# Patient Record
Sex: MALE | Race: ASIAN | NOT HISPANIC OR LATINO | Employment: OTHER | URBAN - METROPOLITAN AREA
[De-identification: names, ages, dates, MRNs, and addresses within clinical notes are randomized per-mention and may not be internally consistent; named-entity substitution may affect disease eponyms.]

---

## 2017-01-05 ENCOUNTER — TELEPHONE (OUTPATIENT)
Dept: CARDIOLOGY | Facility: CLINIC | Age: 47
End: 2017-01-05

## 2017-01-05 NOTE — TELEPHONE ENCOUNTER
Patient called requesting a lab slip be sent for labs due prior to OV 2-1-17 with Dr. Snyder.  Patient requests that an A1C is added onto lab as he has a family history of diabetes. Patient states he does not have a PMD at this time.   Patient would like lab slip mailed to him.   Will message Dr. Snyder

## 2017-01-12 DIAGNOSIS — E78.5 HYPERLIPIDEMIA WITH TARGET LDL LESS THAN 160: ICD-10-CM

## 2017-01-12 DIAGNOSIS — I42.9 MYOCARDIOPATHY (H): ICD-10-CM

## 2017-01-12 DIAGNOSIS — E78.2 MIXED HYPERLIPIDEMIA: Primary | ICD-10-CM

## 2017-01-12 LAB
ALT SERPL W P-5'-P-CCNC: 34 U/L (ref 0–70)
ANION GAP SERPL CALCULATED.3IONS-SCNC: 7 MMOL/L (ref 3–14)
BUN SERPL-MCNC: 13 MG/DL (ref 7–30)
CALCIUM SERPL-MCNC: 9.2 MG/DL (ref 8.5–10.1)
CHLORIDE SERPL-SCNC: 105 MMOL/L (ref 94–109)
CHOLEST SERPL-MCNC: 122 MG/DL
CO2 SERPL-SCNC: 30 MMOL/L (ref 20–32)
CREAT SERPL-MCNC: 0.87 MG/DL (ref 0.66–1.25)
GFR SERPL CREATININE-BSD FRML MDRD: ABNORMAL ML/MIN/1.7M2
GLUCOSE SERPL-MCNC: 103 MG/DL (ref 70–99)
HBA1C MFR BLD: 5.1 % (ref 4.3–6)
HDLC SERPL-MCNC: 41 MG/DL
LDLC SERPL CALC-MCNC: 56 MG/DL
NONHDLC SERPL-MCNC: 81 MG/DL
POTASSIUM SERPL-SCNC: 3.7 MMOL/L (ref 3.4–5.3)
SODIUM SERPL-SCNC: 142 MMOL/L (ref 133–144)
TRIGL SERPL-MCNC: 125 MG/DL

## 2017-01-12 PROCEDURE — 36415 COLL VENOUS BLD VENIPUNCTURE: CPT | Performed by: FAMILY MEDICINE

## 2017-01-12 PROCEDURE — 83036 HEMOGLOBIN GLYCOSYLATED A1C: CPT | Performed by: FAMILY MEDICINE

## 2017-01-12 PROCEDURE — 80061 LIPID PANEL: CPT | Performed by: FAMILY MEDICINE

## 2017-01-12 PROCEDURE — 84460 ALANINE AMINO (ALT) (SGPT): CPT | Performed by: FAMILY MEDICINE

## 2017-01-12 PROCEDURE — 80048 BASIC METABOLIC PNL TOTAL CA: CPT | Performed by: FAMILY MEDICINE

## 2017-01-17 ENCOUNTER — PRE VISIT (OUTPATIENT)
Dept: CARDIOLOGY | Facility: CLINIC | Age: 47
End: 2017-01-17

## 2017-02-01 ENCOUNTER — OFFICE VISIT (OUTPATIENT)
Dept: CARDIOLOGY | Facility: CLINIC | Age: 47
End: 2017-02-01
Attending: INTERNAL MEDICINE
Payer: COMMERCIAL

## 2017-02-01 VITALS
DIASTOLIC BLOOD PRESSURE: 87 MMHG | BODY MASS INDEX: 25.46 KG/M2 | HEIGHT: 69 IN | SYSTOLIC BLOOD PRESSURE: 125 MMHG | HEART RATE: 87 BPM | WEIGHT: 171.9 LBS

## 2017-02-01 DIAGNOSIS — I42.9 CARDIOMYOPATHY OF UNDETERMINED TYPE (H): Primary | Chronic | ICD-10-CM

## 2017-02-01 DIAGNOSIS — Z82.49 FAMILY HISTORY OF EARLY CAD: ICD-10-CM

## 2017-02-01 DIAGNOSIS — E78.2 MIXED HYPERLIPIDEMIA: Chronic | ICD-10-CM

## 2017-02-01 PROCEDURE — 99213 OFFICE O/P EST LOW 20 MIN: CPT | Performed by: INTERNAL MEDICINE

## 2017-02-01 RX ORDER — ATORVASTATIN CALCIUM 10 MG/1
10 TABLET, FILM COATED ORAL DAILY
Qty: 90 TABLET | Refills: 3 | Status: SHIPPED | OUTPATIENT
Start: 2017-02-01 | End: 2017-05-31

## 2017-02-01 NOTE — MR AVS SNAPSHOT
"              After Visit Summary   2/1/2017    Eloise Johnson    MRN: [id number]           Patient Information     Date Of Birth          1970        Visit Information        Provider Department      2/1/2017 3:45 PM Marshal Snyder MD [facility] [place]        Today's Diagnoses     Cardiomyopathy of undetermined type (H)    -  1     Mixed hyperlipidemia         Family history of early CAD            Follow-ups after your visit        Who to contact     If you have questions or need follow up information about today's clinic visit or your schedule please contact [facility] [place] directly at [phone].  Normal or non-critical lab and imaging results will be communicated to you by MyChart, letter or phone within 4 business days after the clinic has received the results. If you do not hear from us within 7 days, please contact the clinic through [employer] or phone. If you have a critical or abnormal lab result, we will notify you by phone as soon as possible.  Submit refill requests through [employer] or call your pharmacy and they will forward the refill request to us. Please allow 3 business days for your refill to be completed.          Additional Information About Your Visit        MyChart Information     [employer] gives you secure access to your electronic health record. If you see a primary care provider, you can also send messages to your care team and make appointments. If you have questions, please call your primary care clinic.  If you do not have a primary care provider, please call [phone] and they will assist you.        Care EveryWhere ID     This is your Care EveryWhere ID. This could be used by other organizations to access your [place] medical records  [id number]        Your Vitals Were     Pulse Height BMI (Body Mass Index)             87 1.753 m (5' 9\") 25.37 kg/m2          Blood Pressure from Last 3 Encounters:   02/01/17 " 125/87   01/14/16 128/86   06/08/15 124/78    Weight from Last 3 Encounters:   02/01/17 77.973 kg (171 lb 14.4 oz)   01/14/16 84.823 kg (187 lb)   06/08/15 82.555 kg (182 lb)              We Performed the Following     Follow-Up with Cardiologist          Where to get your medicines      Some of these will need a paper prescription and others can be bought over the counter.  Ask your nurse if you have questions.     Bring a paper prescription for each of these medications    - atorvastatin 10 MG tablet       Primary Care Provider    None Specified       No primary provider on file.        Thank you!     Thank you for choosing [facility] [place]  for your care. Our goal is always to provide you with excellent care. Hearing back from our patients is one way we can continue to improve our services. Please take a few minutes to complete the written survey that you may receive in the mail after your visit with us. Thank you!             Your Updated Medication List - Protect others around you: Learn how to safely use, store and throw away your medicines at www.disposemymeds.org.          This list is accurate as of: 2/1/17  4:27 PM.  Always use your most recent med list.                   Brand Name Dispense Instructions for use    aspirin EC 81 MG EC tablet      Take 1 tablet (81 mg) by mouth every other day       atorvastatin 10 MG tablet    LIPITOR    90 tablet    Take 1 tablet (10 mg) by mouth daily       Multi-vitamin Tabs tablet   Generic drug:  multivitamin, therapeutic with minerals      Take 1 tablet by mouth daily.       omega-3 fatty acids 1200 MG capsule      Take 2 capsules by mouth daily.       vitamin D 1000 UNITS capsule      Take 1 capsule by mouth daily

## 2017-02-01 NOTE — PROGRESS NOTES
HPI and Plan:   See dictation    Orders Placed This Encounter   Procedures     Lipid Profile     Basic metabolic panel     Basic metabolic panel     Lipid Profile     Follow-Up with Cardiac Advanced Practice Provider     Follow-Up with Cardiologist       Orders Placed This Encounter   Medications     atorvastatin (LIPITOR) 10 MG tablet     Sig: Take 1 tablet (10 mg) by mouth daily     Dispense:  90 tablet     Refill:  3       Medications Discontinued During This Encounter   Medication Reason     GRAPE SEED CR OR Medication Reconciliation Clean Up     atorvastatin (LIPITOR) 10 MG tablet Reorder         Encounter Diagnoses   Name Primary?     Cardiomyopathy of undetermined type (H) Yes     Mixed hyperlipidemia      Family history of early CAD        CURRENT MEDICATIONS:  Current Outpatient Prescriptions   Medication Sig Dispense Refill     atorvastatin (LIPITOR) 10 MG tablet Take 1 tablet (10 mg) by mouth daily 90 tablet 3     Cholecalciferol (VITAMIN D) 1000 UNITS capsule Take 1 capsule by mouth daily       aspirin EC 81 MG tablet Take 1 tablet (81 mg) by mouth every other day       Multiple Vitamin (MULTI-VITAMIN) per tablet Take 1 tablet by mouth daily.       omega-3 fatty acids 1200 MG capsule Take 2 capsules by mouth daily.       [DISCONTINUED] atorvastatin (LIPITOR) 10 MG tablet Take 1 tablet (10 mg) by mouth daily 90 tablet 3       ALLERGIES   No Known Allergies    PAST MEDICAL HISTORY:  Past Medical History   Diagnosis Date     Hyperlipidemia      Family history of early CAD      Cardiomyopathy (H)      idiopathic nonischemic cardiomyopathy       PAST SURGICAL HISTORY:  Past Surgical History   Procedure Laterality Date     Hc tooth extraction w/forcep       Colonoscopy with co2 insufflation N/A 10/24/2014     Procedure: COLONOSCOPY WITH CO2 INSUFFLATION;  Surgeon: Duane, William Charles, MD;  Location:  OR       FAMILY HISTORY:  Family History   Problem Relation Age of Onset     C.A.D. Father      DIABETES  "Maternal Grandmother        SOCIAL HISTORY:  Social History     Social History     Marital Status:      Spouse Name: N/A     Number of Children: N/A     Years of Education: N/A     Social History Main Topics     Smoking status: Former Smoker -- 1.00 packs/day for 2 years     Types: Cigarettes     Smokeless tobacco: Never Used     Alcohol Use: 0.5 - 1.0 oz/week     1-2 Standard drinks or equivalent per week      Comment: socially     Drug Use: No     Sexual Activity: Yes     Other Topics Concern     Caffeine Concern Yes     tea 1-2 per day     Sleep Concern No     Stress Concern No     Weight Concern No     Special Diet No     Exercise No     Seat Belt Yes     Social History Narrative       Review of Systems:  Skin:  Negative       Eyes:  Positive for glasses    ENT:  Negative      Respiratory:  Negative       Cardiovascular:    Positive for;fatigue occ.  Gastroenterology: Negative      Genitourinary:  Negative      Musculoskeletal:  Negative      Neurologic:  Negative      Psychiatric:  Negative      Heme/Lymph/Imm:  Negative      Endocrine:  Negative        Physical Exam:  Vitals: /87 mmHg  Pulse 87  Ht 1.753 m (5' 9\")  Wt 77.973 kg (171 lb 14.4 oz)  BMI 25.37 kg/m2    Constitutional:  cooperative, alert and oriented, well developed, well nourished, in no acute distress        Skin:  warm and dry to the touch, no apparent skin lesions or masses noted        Head:  normocephalic, no masses or lesions        Eyes:  pupils equal and round;conjunctivae and lids unremarkable;sclera white;no xanthalasma;no nystagmus        ENT:  no pallor or cyanosis, dentition good        Neck:  carotid pulses are full and equal bilaterally;no carotid bruit        Chest:  normal breath sounds, clear to auscultation, normal A-P diameter, normal symmetry, normal respiratory excursion, no use of accessory muscles          Cardiac: regular rhythm;normal S1 and S2;no S3 or S4;no murmurs, gallops or rubs detected              "     Abdomen:           Vascular: pulses full and equal                                        Extremities and Back:  no edema;no spinal abnormalities noted;normal muscle strength and tone              Neurological:  affect appropriate, oriented to time, person and place;no gross motor deficits              CC  Marshal Snyder MD  [facility]  [address]  [place] [id number]

## 2017-02-02 NOTE — PROGRESS NOTES
HISTORY OF PRESENT ILLNESS:  Mr. Johnson is a very nice 46-year-old gentleman with past medical history significant for his father dying of a heart attack at age 42.  He has significant HDL deficiency and hypertriglyceridemia.  A stress test in 2009 demonstrated no evidence of ischemia.  Eloise has always been quite reluctant to be on medications.  Ultimately, I will have convinced him to go on 10 mg of atorvastatin and a baby-dose aspirin.  He has also been unwilling to do any sort of test that would involve radiation including a calcium scoring test.  In 2015 he agreed to undergo an MRI, which demonstrated normal perfusion.  Again, he was noted to have a mildly decreased left ventricular function with ejection fraction of 48%.  His stress echo from 2015 also demonstrates decreased left ventricular function, ejection fraction of 40%-45% consistent with a cardiomyopathy of unclear etiology.      Eloise returns to clinic stating he is doing well from a cardiac standpoint but unfortunately is going through a very stressful divorce.  He states he is not eating well.  He has lost 25 pounds of weight and is causing some insomnia problems.  He thinks he is exercising about every other day walking and he has no chest, arm, neck, jaw or shoulder discomfort with this.  He has had no lightheadedness, dizziness, syncope or near-syncope.  He remains on his medications, although he stopped his Resveratrol.      ASSESSMENT AND PLAN:  Eloise appears to be doing well from a cardiac standpoint without clinical evidence of ischemia, heart failure or significant arrhythmia.      With his weight loss, his cholesterol profile is the best it has ever been.  Total cholesterol is 122, HDL is 41, LDL is 56, triglycerides are 125.      We talked about whether these numbers are real or not, and I have told him that they probably are real because of his weight loss, that unfortunately stress is also a cardiac risk factor and that obviously has gone up  and there is no way to quantify that.      Blood pressure is very well controlled at 125/87.  With his weight loss, his body mass index is now 25.4.      He is concerned about his glucose that runs 103.  I pulled up 5 glucoses that I have over the years, and he tends to run in the high 90s to low 100s, and we talked about factors that may make him manifest diabetes including weight gain, high-carbohydrate diet or sedentary lifestyle.  He does eat a traditional Chinese diet and does like to eat his white rice, although he states he is only eating 1 bowl and right now he has anorexia often enough that he is not even eating that much.      We will plan on seeing him back in 1 year.  If he should have any problems, I would be glad to see him sooner.         ABDULAZIZ SMITH MD, [facility]             D: 2017 16:36   T: 2017 05:36   MT: JAIME      Name:     BRIGHT DONIS   MRN:      [id number]        Account:      [id number]   :      1970           Service Date: 2017      Document: [id number]

## 2017-05-01 ENCOUNTER — OFFICE VISIT (OUTPATIENT)
Dept: FAMILY MEDICINE | Facility: CLINIC | Age: 47
End: 2017-05-01
Payer: COMMERCIAL

## 2017-05-01 VITALS
WEIGHT: 170 LBS | SYSTOLIC BLOOD PRESSURE: 124 MMHG | TEMPERATURE: 97.9 F | HEART RATE: 72 BPM | RESPIRATION RATE: 16 BRPM | HEIGHT: 72 IN | BODY MASS INDEX: 23.03 KG/M2 | DIASTOLIC BLOOD PRESSURE: 80 MMHG

## 2017-05-01 DIAGNOSIS — F51.04 PSYCHOPHYSIOLOGICAL INSOMNIA: ICD-10-CM

## 2017-05-01 DIAGNOSIS — F41.9 ANXIETY: Primary | ICD-10-CM

## 2017-05-01 PROCEDURE — 99214 OFFICE O/P EST MOD 30 MIN: CPT | Performed by: FAMILY MEDICINE

## 2017-05-01 RX ORDER — POLYETHYLENE GLYCOL 3350 17 G/17G
POWDER, FOR SOLUTION ORAL
Refills: 6 | COMMUNITY
Start: 2017-01-26 | End: 2017-05-01

## 2017-05-01 RX ORDER — BIOTIN 1 MG
TABLET ORAL
Refills: 6 | COMMUNITY
Start: 2017-02-04 | End: 2017-05-01

## 2017-05-01 RX ORDER — CITALOPRAM HYDROBROMIDE 20 MG/1
20 TABLET ORAL DAILY
Qty: 30 TABLET | Refills: 1 | Status: SHIPPED | OUTPATIENT
Start: 2017-05-01 | End: 2017-07-13

## 2017-05-01 ASSESSMENT — ANXIETY QUESTIONNAIRES
GAD7 TOTAL SCORE: 12
7. FEELING AFRAID AS IF SOMETHING AWFUL MIGHT HAPPEN: NEARLY EVERY DAY
IF YOU CHECKED OFF ANY PROBLEMS ON THIS QUESTIONNAIRE, HOW DIFFICULT HAVE THESE PROBLEMS MADE IT FOR YOU TO DO YOUR WORK, TAKE CARE OF THINGS AT HOME, OR GET ALONG WITH OTHER PEOPLE: VERY DIFFICULT
1. FEELING NERVOUS, ANXIOUS, OR ON EDGE: MORE THAN HALF THE DAYS
2. NOT BEING ABLE TO STOP OR CONTROL WORRYING: NEARLY EVERY DAY
6. BECOMING EASILY ANNOYED OR IRRITABLE: NOT AT ALL
3. WORRYING TOO MUCH ABOUT DIFFERENT THINGS: MORE THAN HALF THE DAYS
5. BEING SO RESTLESS THAT IT IS HARD TO SIT STILL: NOT AT ALL
4. TROUBLE RELAXING: MORE THAN HALF THE DAYS

## 2017-05-01 ASSESSMENT — PAIN SCALES - GENERAL: PAINLEVEL: NO PAIN (0)

## 2017-05-01 NOTE — LETTER
My Depression Action Plan  Name: Eloise Johnson   Date of Birth 1970  Date: 5/1/2017    My doctor: No primary care provider on file.   [facility]  [address] [place] [address], [address]  [place] [id number] [phone]          GREEN    ZONE   Good Control    What it looks like:     Things are going generally well. You have normal up s and down s. You may even feel depressed from time to time, but bad moods usually last less than a day.   What you need to do:  1. Continue to care for yourself (see self care plan)  2. Check your depression survival kit and update it as needed  3. Follow your physician s recommendations including any medication.  4. Do not stop taking medication unless you consult with your physician first.           YELLOW         ZONE Getting Worse    What it looks like:     Depression is starting to interfere with your life.     It may be hard to get out of bed; you may be starting to isolate yourself from others.    Symptoms of depression are starting to last most all day and this has happened for several days.     You may have suicidal thoughts but they are not constant.   What you need to do:     1. Call your care team, your response to treatment will improve if you keep your care team informed of your progress. Yellow periods are signs an adjustment may need to be made.     2. Continue your self-care, even if you have to fake it!    3. Talk to someone in your support network    4. Open up your depression survival kit           RED    ZONE Medical Alert - Get Help    What it looks like:     Depression is seriously interfering with your life.     You may experience these or other symptoms: You can t get out of bed most days, can t work or engage in other necessary activities, you have trouble taking care of basic hygiene, or basic responsibilities, thoughts of suicide or death that will not go away, self-injurious behavior.     What you need to do:  1. Call your  care team and request a same-day appointment. If they are not available (weekends or after hours) call your local crisis line, emergency room or 911.      Electronically signed by: Fabian Crowder, May 1, 2017    Depression Self Care Plan / Survival Kit    Self-Care for Depression  Here s the deal. Your body and mind are really not as separate as most people think.  What you do and think affects how you feel and how you feel influences what you do and think. This means if you do things that people who feel good do, it will help you feel better.  Sometimes this is all it takes.  There is also a place for medication and therapy depending on how severe your depression is, so be sure to consult with your medical provider and/ or Behavioral Health Consultant if your symptoms are worsening or not improving.     In order to better manage my stress, I will:    Exercise  Get some form of exercise, every day. This will help reduce pain and release endorphins, the  feel good  chemicals in your brain. This is almost as good as taking antidepressants!  This is not the same as joining a gym and then never going! (they count on that by the way ) It can be as simple as just going for a walk or doing some gardening, anything that will get you moving.      Hygiene   Maintain good hygiene (Get out of bed in the morning, Make your bed, Brush your teeth, Take a shower, and Get dressed like you were going to work, even if you are unemployed).  If your clothes don't fit try to get ones that do.    Diet  I will strive to eat foods that are good for me, drink plenty of water, and avoid excessive sugar, caffeine, alcohol, and other mood-altering substances.  Some foods that are helpful in depression are: complex carbohydrates, B vitamins, flaxseed, fish or fish oil, fresh fruits and vegetables.    Psychotherapy  I agree to participate in Individual Therapy (if recommended).    Medication  If prescribed medications, I agree to take them.   Missing doses can result in serious side effects.  I understand that drinking alcohol, or other illicit drug use, may cause potential side effects.  I will not stop my medication abruptly without first discussing it with my provider.    Staying Connected With Others  I will stay in touch with my friends, family members, and my primary care provider/team.    Use your imagination  Be creative.  We all have a creative side; it doesn t matter if it s oil painting, sand castles, or mud pies! This will also kick up the endorphins.    Witness Beauty  (AKA stop and smell the roses) Take a look outside, even in mid-winter. Notice colors, textures. Watch the squirrels and birds.     Service to others  Be of service to others.  There is always someone else in need.  By helping others we can  get out of ourselves  and remember the really important things.  This also provides opportunities for practicing all the other parts of the program.    Humor  Laugh and be silly!  Adjust your TV habits for less news and crime-drama and more comedy.    Control your stress  Try breathing deep, massage therapy, biofeedback, and meditation. Find time to relax each day.     My support system    Clinic Contact:  Phone number:    Contact 1:  Phone number:    Contact 2:  Phone number:    [employer]/:  Phone number:    Therapist:  Phone number:    Local crisis center:    Phone number:    Other community support:  Phone number:

## 2017-05-01 NOTE — MR AVS SNAPSHOT
After Visit Summary   5/1/2017    Eloise Johnson    MRN: [id number]           Patient Information     Date Of Birth          1970        Visit Information        Provider Department      5/1/2017 11:00 AM Fabian Crowder MD [facility]        Today's Diagnoses     Anxiety    -  1    Psychophysiological insomnia          Care Instructions    These are new changes to your current plan of care based on today's visit:    Medications stopped    Medications to be started Celexa 10 mg daily for six doses and then increase to 20 mg daily   Change dose of this medication    New treatments        Follow up appointments:    1.  FOLLOW UP WITH YOUR PRIMARY CARE PROVIDER: 4 week(s) for clinic visit    2. FOLLOW UP WITH SPECIALIST:Continue with individual therapy/counseling    Fabian Crowder MD              Follow-ups after your visit        Additional Services     MENTAL HEALTH REFERRAL       Your provider has referred you to: [facility]: [facility] - Counseling (Individual/Couples/Family) - [facility] [phone]   [email or web address]   *Patient will be contacted by [facility] scheduling partner, [facility] ([facility]), to schedule an appointment.  Patients may also call P to schedule.  [facility] [phone]   [email or web address]   *Patient will be contacted by [facility] scheduling partner, [facility] ([facility]), to schedule an appointment.  Patients may also call P to schedule.    All scheduling is subject to the client's specific insurance plan & benefits, provider/location availability, and provider clinical specialities.  Please arrive 15 minutes early for your first appointment and bring your completed paperwork.    Please be aware that coverage of these services is subject to the terms and limitations  "of your health insurance plan.  Call member services at your health plan with any benefit or coverage questions.                  Who to contact     If you have questions or need follow up information about today's clinic visit or your schedule please contact [facility] directly at [phone].  Normal or non-critical lab and imaging results will be communicated to you by MyChart, letter or phone within 4 business days after the clinic has received the results. If you do not hear from us within 7 days, please contact the clinic through [employer] or phone. If you have a critical or abnormal lab result, we will notify you by phone as soon as possible.  Submit refill requests through [employer] or call your pharmacy and they will forward the refill request to us. Please allow 3 business days for your refill to be completed.          Additional Information About Your Visit        [employer] Information     [employer] gives you secure access to your electronic health record. If you see a primary care provider, you can also send messages to your care team and make appointments. If you have questions, please call your primary care clinic.  If you do not have a primary care provider, please call [phone] and they will assist you.        Care EveryWhere ID     This is your Care EveryWhere ID. This could be used by other organizations to access your [place] medical records  [id number]        Your Vitals Were     Pulse Temperature Respirations Height BMI (Body Mass Index)       72 97.9  F (36.6  C) (Temporal) 16 5' 11.5\" (1.816 m) 23.38 kg/m2        Blood Pressure from Last 3 Encounters:   05/01/17 124/80   02/01/17 125/87   01/14/16 128/86    Weight from Last 3 Encounters:   05/01/17 170 lb (77.1 kg)   02/01/17 171 lb 14.4 oz (78 kg)   01/14/16 187 lb (84.8 kg)              We Performed the Following     DEPRESSION ACTION PLAN (DAP)     MENTAL HEALTH REFERRAL          Today's Medication Changes          These " changes are accurate as of: 5/1/17 11:35 AM.  If you have any questions, ask your nurse or doctor.               Start taking these medicines.        Dose/Directions    citalopram 20 MG tablet   Commonly known as:  celeXA   Used for:  Anxiety, Psychophysiological insomnia   Started by:  Fabian Crowder MD        Dose:  20 mg   Take 1 tablet (20 mg) by mouth daily   Quantity:  30 tablet   Refills:  1         Stop taking these medicines if you haven't already. Please contact your care team if you have questions.     Fiber (Corn Dextrin) Powd   Stopped by:  Fabian Crowder MD           polyethylene glycol powder   Commonly known as:  MIRALAX/GLYCOLAX   Stopped by:  Fabian Crowder MD                Where to get your medicines      These medications were sent to [facility] [address] [place] [address] [address] AT [facility] & [address] , [facility] [address]     Phone:  [phone]     citalopram 20 MG tablet                Primary Care Provider    None Specified       No primary provider on file.        Thank you!     Thank you for choosing [facility]  for your care. Our goal is always to provide you with excellent care. Hearing back from our patients is one way we can continue to improve our services. Please take a few minutes to complete the written survey that you may receive in the mail after your visit with us. Thank you!             Your Updated Medication List - Protect others around you: Learn how to safely use, store and throw away your medicines at www.disposemymeds.org.          This list is accurate as of: 5/1/17 11:35 AM.  Always use your most recent med list.                   Brand Name Dispense Instructions for use    aspirin EC 81 MG EC tablet      Take 1 tablet (81 mg) by mouth every other day       atorvastatin 10 MG tablet    LIPITOR    90 tablet    Take 1 tablet (10 mg) by mouth daily       citalopram 20 MG tablet    celeXA     30 tablet    Take 1 tablet (20 mg) by mouth daily       Multi-vitamin Tabs tablet   Generic drug:  multivitamin, therapeutic with minerals      Take 1 tablet by mouth daily.       omega-3 fatty acids 1200 MG capsule      Take 2 capsules by mouth daily.       vitamin D 1000 UNITS capsule      Take 1 capsule by mouth daily

## 2017-05-01 NOTE — NURSING NOTE
"Chief Complaint   Patient presents with     Anxiety     Insomnia     Panel Management     FANTA        Initial /80 (BP Location: Left arm, Cuff Size: Adult Regular)  Pulse 72  Temp 97.9  F (36.6  C) (Temporal)  Resp 16  Ht 5' 11.5\" (1.816 m)  Wt 170 lb (77.1 kg)  BMI 23.38 kg/m2 Estimated body mass index is 23.38 kg/(m^2) as calculated from the following:    Height as of this encounter: 5' 11.5\" (1.816 m).    Weight as of this encounter: 170 lb (77.1 kg).  Medication Reconciliation: complete       Figueroa Owusu MA    "

## 2017-05-01 NOTE — PATIENT INSTRUCTIONS
These are new changes to your current plan of care based on today's visit:    Medications stopped    Medications to be started Celexa 10 mg daily for six doses and then increase to 20 mg daily   Change dose of this medication    New treatments        Follow up appointments:    1.  FOLLOW UP WITH YOUR PRIMARY CARE PROVIDER: 4 week(s) for clinic visit    2. FOLLOW UP WITH SPECIALIST:Continue with individual therapy/counseling    Fabian Crowder MD

## 2017-05-01 NOTE — PROGRESS NOTES
"  SUBJECTIVE:                                                    Eloise He is a 47 year old male who presents to clinic today for the following health issues:      Abnormal Mood Symptoms--    This patient's had increased symptoms of anxiety which is now causing some sleep disturbance with early awakening. 3 years ago he was treated for anxiety and depression with citalopram and bupropion. This resolved and he has been off medications since 2015. The source of his anxiety and possible depression was from work stress.    3 months ago he's had increased family stress due to  divorce proceedings. He is currently  from his wife and his children are staying with him in their home. The stress is increased as a legal proceedings began. He becomes restless at night and wakes up early in the middle the night and cannot go back to sleep. He feels increasing levels of anxiety. Possibly some mild depression but mainly anxiety.    No suicidal thoughts or self-harm is noted.  He is currently seeing a therapist every other week.  He is looking to change therapists.        Onset: 3 MONTHS     Description:   Depression: no- \"I don't know\"  Anxiety: YES    Accompanying Signs & Symptoms:  Still participating in activities that you used to enjoy: no  Fatigue: YES  Irritability: no   Difficulty concentrating: YES  Changes in appetite: YES  Problems with sleep: YES- wake up in the middle of the night, sleepiness,   Heart racing/beating fast : no   Thoughts of hurting yourself or others: none     History:   Recent stress: YES- home and family   Prior depression hospitalization: None  Family history of depression: no  Family history of anxiety: no      Precipitating factors:   Alcohol/drug use: no    Alleviating factors:  None        Therapies Tried and outcome: herbal, melatonin     Insomnia--appears to be from early awakening at 3 AM in the morning due to anxiety and inability to fall back asleep. He may have had trazodone in the " "past and does not wish to have any hypnotics.        Onset: 3 months     Description:   Time to fall asleep (sleep latency): 30 minutes or more   Middle of night awakening:  YES  Early morning awakening:  YES, feel nervous and fear in the morning     Progression of Symptoms:  same    Accompanying Signs & Symptoms:  Daytime sleepiness/napping: YES  Excessive snoring/apnea: no   Restless legs: YES- some   Frequent urination: YES  Chronic pain:  no   History:  Prior Insomnia: YES- had it before 2014     Precipitating factors:   New stressful situation: YES- family issues   Caffeine intake: YES- tea and sometime coffee- \"not very often\"   OTC decongestants: no   Any new medications: no     Alleviating factors:  Self medicating (alcohol, etc.):  no       Therapies Tried and outcome: melatonin-  Does not help much          Problem list and histories reviewed & adjusted, as indicated.  Additional history: as documented        Reviewed and updated as needed this visit by clinical staff       Reviewed and updated as needed this visit by Provider         ROS:  C: NEGATIVE for fever, chills, change in weight  I: NEGATIVE for worrisome rashes, moles or lesions  E: NEGATIVE for vision changes or irritation  E/M: NEGATIVE for ear, mouth and throat problems  R: NEGATIVE for significant cough or SOB  B: NEGATIVE for masses, tenderness or discharge  CV: NEGATIVE for chest pain, palpitations or peripheral edema  CV: Is on primary preventative therapy for coronary disease. On statin therapy and aspirin. No active cardiac disease.  GI: NEGATIVE for nausea, abdominal pain, heartburn, or change in bowel habits  : NEGATIVE for frequency, dysuria, or hematuria  M: NEGATIVE for significant arthralgias or myalgia  N: NEGATIVE for weakness, dizziness or paresthesias  E: NEGATIVE for temperature intolerance, skin/hair changes  H: NEGATIVE for bleeding problems  P: NEGATIVE for changes in mood or affect  PSYCHIATRIC: As noted " "above    OBJECTIVE:                                                    /80 (BP Location: Left arm, Cuff Size: Adult Regular)  Pulse 72  Temp 97.9  F (36.6  C) (Temporal)  Resp 16  Ht 5' 11.5\" (1.816 m)  Wt 170 lb (77.1 kg)  BMI 23.38 kg/m2  Body mass index is 23.38 kg/(m^2).  GENERAL: alert, active, no distress, cooperative and over weight  EYES: Eyes grossly normal to inspection, extraocular movements - intact, and PERRL  HENT: ear canals- normal; TMs- normal; Nose- normal; Mouth- no ulcers, no lesions  NECK: no tenderness, no adenopathy, no asymmetry, no masses, no stiffness; thyroid- normal to palpation  RESP: lungs clear to auscultation - no rales, no rhonchi, no wheezes  CV: regular rates and rhythm, normal S1 S2, no S3 or S4 and no murmur, no click or rub -  ABDOMEN: soft, no tenderness, no  hepatosplenomegaly, no masses, normal bowel sounds  MS: extremities- no gross deformities noted, no edema  NEURO: strength and tone- normal, sensory exam- grossly normal, mentation- intact, speech- normal, reflexes- symmetric  PSYCH: Alert and oriented times 3; speech- coherent , normal rate and volume; able to articulate logical thoughts, able to abstract reason, no tangential thoughts, no hallucinations or delusions, affect- normal    Diagnostic test results:  Diagnostic Test Results:  none      ASSESSMENT/PLAN:                                                    1. Anxiety   Past history of anxiety which responded well to Citalopram. Will begin Citalopram 20 mg daily and follow-up in 4 weeks. Have not prescribed Wellbutrin due to minimal depression if any. He will also schedule with a new counselor and therapist.  - citalopram (CELEXA) 20 MG tablet; Take 1 tablet (20 mg) by mouth daily  Dispense: 30 tablet; Refill: 1  - MENTAL HEALTH REFERRAL    2. Psychophysiological insomnia   We'll treat the anxiety with SSRI therapy and monitor over time. Additional medications given.  - citalopram (CELEXA) 20 MG tablet; " Take 1 tablet (20 mg) by mouth daily  Dispense: 30 tablet; Refill: 1  - MENTAL HEALTH REFERRAL      Follow up with Provider - Follow-up in about 4 weeks and adjust medication doses needed.   See Patient Instructions    The patient understood the rational for the diagnosis and treatment plan. All questions were answered to best of my ability and the patient's satisfaction.      Fabian Crowder MD  [facility]

## 2017-05-02 ASSESSMENT — ANXIETY QUESTIONNAIRES: GAD7 TOTAL SCORE: 12

## 2017-05-22 ENCOUNTER — TELEPHONE (OUTPATIENT)
Dept: FAMILY MEDICINE | Facility: CLINIC | Age: 47
End: 2017-05-22

## 2017-05-22 DIAGNOSIS — Z78.9 NO IMMUNIZATION HISTORY RECORD: Primary | ICD-10-CM

## 2017-05-22 NOTE — TELEPHONE ENCOUNTER
Reason for Call:  Other question about immunization    Detailed comments: patient needs to get some immunizations for a new job and the patient doesn't know if he ever had the MMR or the chicken pox. Patient wants to know if he should just have the immunizations done or should he have lab work to see if he needs them?     Phone Number Patient can be reached at: Cell number on file:    Telephone Information:   Mobile [phone]       Best Time: anytime    Can we leave a detailed message on this number? YES    Call taken on 5/22/2017 at 2:20 PM by Krystle Vega

## 2017-05-22 NOTE — TELEPHONE ENCOUNTER
Patient notified no hx on file for these immunizations. Patient will come in for titer to be drawn tomorrow. Placed on lab schedule. Also needs TB skin test, on float schedule.     Taylor Ivy, RN, BSN

## 2017-05-23 ENCOUNTER — ALLIED HEALTH/NURSE VISIT (OUTPATIENT)
Dept: FAMILY MEDICINE | Facility: CLINIC | Age: 47
End: 2017-05-23
Payer: COMMERCIAL

## 2017-05-23 DIAGNOSIS — Z11.1 SCREENING EXAMINATION FOR PULMONARY TUBERCULOSIS: Primary | ICD-10-CM

## 2017-05-23 DIAGNOSIS — Z53.9 ERRONEOUS ENCOUNTER--DISREGARD: Primary | ICD-10-CM

## 2017-05-23 DIAGNOSIS — Z78.9 NO IMMUNIZATION HISTORY RECORD: ICD-10-CM

## 2017-05-23 PROCEDURE — 86787 VARICELLA-ZOSTER ANTIBODY: CPT | Performed by: FAMILY MEDICINE

## 2017-05-23 PROCEDURE — 86765 RUBEOLA ANTIBODY: CPT | Performed by: FAMILY MEDICINE

## 2017-05-23 PROCEDURE — 86735 MUMPS ANTIBODY: CPT | Performed by: FAMILY MEDICINE

## 2017-05-23 PROCEDURE — 86580 TB INTRADERMAL TEST: CPT

## 2017-05-23 PROCEDURE — 86762 RUBELLA ANTIBODY: CPT | Performed by: FAMILY MEDICINE

## 2017-05-23 PROCEDURE — 99207 ZZC NO CHARGE NURSE ONLY: CPT

## 2017-05-23 PROCEDURE — 36415 COLL VENOUS BLD VENIPUNCTURE: CPT | Performed by: FAMILY MEDICINE

## 2017-05-23 NOTE — MR AVS SNAPSHOT
After Visit Summary   5/23/2017    Eloise Johnson    MRN: [id number]           Patient Information     Date Of Birth          1970        Visit Information        Provider Department      [facility]        Today's Diagnoses     Screening examination for pulmonary tuberculosis    -  1    No immunization history record           Follow-ups after your visit        Your next 10 appointments already scheduled     May 25, 2017  9:30 AM CDT   Nurse Only with  FLOAT 1   [facility] ([facility]    [address], [address]  [place] [id number] [phone]            May 31, 2017  9:00 AM CDT   New Visit with Guy [facility] [place] ([facility] [place])    [address]  [place] [address] [phone]              Who to contact     If you have questions or need follow up information about today's clinic visit or your schedule please contact [facility] directly at [phone].  Normal or non-critical lab and imaging results will be communicated to you by UIEvolutionhart, letter or phone within 4 business days after the clinic has received the results. If you do not hear from us within 7 days, please contact the clinic through [employer] Website or phone. If you have a critical or abnormal lab result, we will notify you by phone as soon as possible.  Submit refill requests through [employer] Website or call your pharmacy and they will forward the refill request to us. Please allow 3 business days for your refill to be completed.          Additional Information About Your Visit        UIEvolutionhart Information     [employer] Website gives you secure access to your electronic health record. If you see a primary care provider, you can also send messages to your care team and make appointments. If you have questions, please call your primary care clinic.  If you do not have a primary care provider, please call  [phone] and they will assist you.        Care EveryWhere ID     This is your Care EveryWhere ID. This could be used by other organizations to access your [facility] medical records  [id number]         Blood Pressure from Last 3 Encounters:   05/01/17 124/80   02/01/17 125/87   01/14/16 128/86    Weight from Last 3 Encounters:   05/01/17 170 lb (77.1 kg)   02/01/17 171 lb 14.4 oz (78 kg)   01/14/16 187 lb (84.8 kg)              We Performed the Following     Mumps Antibody IgG     Rubella Antibody IgG Quantitative     Rubeola Antibody IgG     TB INTRADERMAL TEST     Varicella Zoster Virus Antibody IgG        Primary Care Provider    None Specified       No primary provider on file.        Thank you!     Thank you for choosing [facility]  for your care. Our goal is always to provide you with excellent care. Hearing back from our patients is one way we can continue to improve our services. Please take a few minutes to complete the written survey that you may receive in the mail after your visit with us. Thank you!             Your Updated Medication List - Protect others around you: Learn how to safely use, store and throw away your medicines at www.disposemymeds.org.          This list is accurate as of: 5/23/17 10:16 AM.  Always use your most recent med list.                   Brand Name Dispense Instructions for use    aspirin EC 81 MG EC tablet      Take 1 tablet (81 mg) by mouth every other day       atorvastatin 10 MG tablet    LIPITOR    90 tablet    Take 1 tablet (10 mg) by mouth daily       citalopram 20 MG tablet    celeXA    30 tablet    Take 1 tablet (20 mg) by mouth daily       Multi-vitamin Tabs tablet   Generic drug:  multivitamin, therapeutic with minerals      Take 1 tablet by mouth daily.       omega-3 fatty acids 1200 MG capsule      Take 2 capsules by mouth daily.       vitamin D 1000 UNITS capsule      Take 1 capsule by mouth daily

## 2017-05-24 LAB
MEV IGG SER QL IA: 0.3 AI (ref 0–0.8)
MUV IGG SER QL IA: 1.8 AI (ref 0–0.8)
RUBV IGG SERPL IA-ACNC: 14 IU/ML
VZV IGG SER QL IA: 1.5 AI (ref 0–0.8)

## 2017-05-25 ENCOUNTER — ALLIED HEALTH/NURSE VISIT (OUTPATIENT)
Dept: FAMILY MEDICINE | Facility: CLINIC | Age: 47
End: 2017-05-25
Payer: COMMERCIAL

## 2017-05-25 DIAGNOSIS — Z23 NEED FOR MMR VACCINE: Primary | ICD-10-CM

## 2017-05-25 LAB
PPDINDURATION: 0 MM (ref 0–5)
PPDREDNESS: 0 MM

## 2017-05-25 PROCEDURE — 90707 MMR VACCINE SC: CPT

## 2017-05-25 PROCEDURE — 90471 IMMUNIZATION ADMIN: CPT

## 2017-05-25 PROCEDURE — 99207 ZZC NO CHARGE NURSE ONLY: CPT

## 2017-05-25 NOTE — MR AVS SNAPSHOT
After Visit Summary   5/25/2017    Eloise Johnson    MRN: [id number]           Patient Information     Date Of Birth          1970        Visit Information        Provider Department      5/25/2017 9:30 AM RG FLOAT 1 [facility]        Today's Diagnoses     Need for MMR vaccine    -  1       Follow-ups after your visit        Your next 10 appointments already scheduled     May 31, 2017  9:00 AM CDT   New Visit with GENARO Louis   [facility] [place] ([facility] [place])    [address]  [place] [address] [phone]              Who to contact     If you have questions or need follow up information about today's clinic visit or your schedule please contact [facility] directly at [phone].  Normal or non-critical lab and imaging results will be communicated to you by [employer] Labshart, letter or phone within 4 business days after the clinic has received the results. If you do not hear from us within 7 days, please contact the clinic through [employer]shart or phone. If you have a critical or abnormal lab result, we will notify you by phone as soon as possible.  Submit refill requests through iFl[employer] or call your pharmacy and they will forward the refill request to us. Please allow 3 business days for your refill to be completed.          Additional Information About Your Visit        MyChart Information     [employer] gives you secure access to your electronic health record. If you see a primary care provider, you can also send messages to your care team and make appointments. If you have questions, please call your primary care clinic.  If you do not have a primary care provider, please call [phone] and they will assist you.        Care EveryWhere ID     This is your Care EveryWhere ID. This could be used by other organizations to access your [facility] medical records  [id number]         Blood Pressure from Last 3 Encounters:   05/01/17 124/80    02/01/17 125/87   01/14/16 128/86    Weight from Last 3 Encounters:   05/01/17 170 lb (77.1 kg)   02/01/17 171 lb 14.4 oz (78 kg)   01/14/16 187 lb (84.8 kg)              We Performed the Following     ADMIN 1st VACCINE     MMR VIRUS IMMUNIZATION, SUBCUT        Primary Care Provider    None Specified       No primary provider on file.        Thank you!     Thank you for choosing [facility]  for your care. Our goal is always to provide you with excellent care. Hearing back from our patients is one way we can continue to improve our services. Please take a few minutes to complete the written survey that you may receive in the mail after your visit with us. Thank you!             Your Updated Medication List - Protect others around you: Learn how to safely use, store and throw away your medicines at www.disposemymeds.org.          This list is accurate as of: 5/25/17 10:57 AM.  Always use your most recent med list.                   Brand Name Dispense Instructions for use    aspirin EC 81 MG EC tablet      Take 1 tablet (81 mg) by mouth every other day       atorvastatin 10 MG tablet    LIPITOR    90 tablet    Take 1 tablet (10 mg) by mouth daily       citalopram 20 MG tablet    celeXA    30 tablet    Take 1 tablet (20 mg) by mouth daily       Multi-vitamin Tabs tablet   Generic drug:  multivitamin, therapeutic with minerals      Take 1 tablet by mouth daily.       omega-3 fatty acids 1200 MG capsule      Take 2 capsules by mouth daily.       vitamin D 1000 UNITS capsule      Take 1 capsule by mouth daily

## 2017-05-25 NOTE — NURSING NOTE
Chief Complaint   Patient presents with     Mantoux Results Reading       Mantoux results NEGATIVE. No induration.  No swelling.  No redness.      Figueroa Owusu MA

## 2017-05-25 NOTE — NURSING NOTE
Chief Complaint   Patient presents with     Mantoux Results Reading     Prior to injection verified patient identity using patient's name and date of birth.      Screening Questionnaire for Adult Immunization    Are you sick today?   No   Do you have allergies to medications, food, a vaccine component or latex?   No   Have you ever had a serious reaction after receiving a vaccination?   Don't Know   Do you have a long-term health problem with heart disease, lung disease, asthma, kidney disease, metabolic disease (e.g. diabetes), anemia, or other blood disorder?   No   Do you have cancer, leukemia, HIV/AIDS, or any other immune system problem?   No   In the past 3 months, have you taken medications that affect  your immune system, such as prednisone, other steroids, or anticancer drugs; drugs for the treatment of rheumatoid arthritis, Crohn s disease, or psoriasis; or have you had radiation treatments?   No   Have you had a seizure, or a brain or other nervous system problem?   No   During the past year, have you received a transfusion of blood or blood     products, or been given immune (gamma) globulin or antiviral drug?   No   For women: Are you pregnant or is there a chance you could become        pregnant during the next month?   No   Have you received any vaccinations in the past 4 weeks?   No     Immunization questionnaire answers were all negative.      MNVFC doesn't apply on this patient    Per orders of Taylor Pabon, injection of mmr  given by Figueroa Owusu. Patient instructed to remain in clinic for 20 minutes afterwards, and to report any adverse reaction to me immediately.       Screening performed by Figueroa Owusu on 5/25/2017 at 10:54 AM.

## 2017-05-31 DIAGNOSIS — E78.2 MIXED HYPERLIPIDEMIA: Chronic | ICD-10-CM

## 2017-05-31 RX ORDER — ATORVASTATIN CALCIUM 10 MG/1
10 TABLET, FILM COATED ORAL DAILY
Qty: 90 TABLET | Refills: 3 | Status: SHIPPED | OUTPATIENT
Start: 2017-05-31 | End: 2018-06-25

## 2017-05-31 NOTE — TELEPHONE ENCOUNTER
Received refill request for:  atorvastatin  Last OV was: 2/1/2017 with Dr. Snyder  Labs/EKG: last lipid 1/12/2017  F/U scheduled: orders in Epic for 2/2018  New script sent to: [facility]

## 2017-07-05 NOTE — PROGRESS NOTES
SUBJECTIVE:                                                    Eloise He is a 47 year old male who presents to clinic today for the following health issues:    Hyperlipidemia Follow-Up--managed by his cardiologist. Is on statin therapy. Has a family history of coronary heart disease.      Rate your low fat/cholesterol diet?: fair    Taking statin?  Yes, no muscle aches from statin    Other lipid medications/supplements?:  none    Anxiety Follow-Up-- states he is doing much better. He actually stopped Celexa after a few weeks and has been off  medication for at least 3 weeks. He states he is sleeping better and no major problems with anxiety. There appears to be some improvement in his legal separation and a possibility of saving his marriage. He is exercising daily which he states helps. Depression is resolved. All of this appeared to be situational. Is now sleeping better without any medications.    Status since last visit: Improved     Other associated symptoms:None    Complicating factors:   Significant life event: No   Current substance abuse: None  Depression symptoms: No  FANTA-7 SCORE 10/27/2014 5/1/2017 7/13/2017   Total Score 16 - -   Total Score - -  (minimal anxiety)   Total Score - 12 -       GAD7        Amount of exercise or physical activity: 2-3 days/week for an average of 15-30 minutes    Problems taking medications regularly: No    Medication side effects: none    Diet: regular (no restrictions)    Discuss immunizations for work--    Was found to be nonimmune to Rubeola but immune to Rubella, mumps and varicella. He received an MMR vaccination 5 weeks ago. He is due for repeat antibody testing daily cleared for full activities at work.            Problem list and histories reviewed & adjusted, as indicated.  Additional history: as documented        Reviewed and updated as needed this visit by clinical staff       Reviewed and updated as needed this visit by Provider         ROS:   ROS: 10 point ROS  neg or unchanged other than the symptoms noted above in the HPI.      OBJECTIVE:                                                    /82  Pulse 80  Temp 97.8  F (36.6  C) (Temporal)  Resp 16  Wt 174 lb 4.8 oz (79.1 kg)  BMI 23.97 kg/m2  Body mass index is 23.97 kg/(m^2).  GENERAL: healthy, alert and no distress  HENT: ear canals- normal; TMs- normal; Nose- normal; Mouth- no ulcers, no lesions  NECK: no tenderness, no adenopathy, no asymmetry, no masses, no stiffness; thyroid- normal to palpation  RESP: lungs clear to auscultation - no rales, no rhonchi, no wheezes  CV: regular rates and rhythm, normal S1 S2, no S3 or S4 and no murmur, no click or rub -  ABDOMEN: soft, no tenderness, no  hepatosplenomegaly, no masses, normal bowel sounds  MS: extremities- no gross deformities noted, no edema  NEURO: strength and tone- normal, sensory exam- grossly normal, mentation- intact, speech- normal, reflexes- symmetric  PSYCH: Alert and oriented times 3; speech- coherent , normal rate and volume; able to articulate logical thoughts, able to abstract reason, no tangential thoughts, no hallucinations or delusions, affect- normal    Diagnostic test results:  Diagnostic Test Results:  Rubeola IgG titer pending      ASSESSMENT/PLAN:                                                    1. Anxiety   Is resolved. Continue with counseling. Appears much improved with the improvement and outlook for saving his marriage. No continue off Celexa. Follow-up as needed.    2. Psychophysiological insomnia   Resolved with resolution of anxiety and depression. No medication needed.    3. Immunity status testing   Testing Rubeola titer.  - Rubeola Antibody IgG      Follow up with Provider - Follow-up as needed.   See Patient Instructions    The patient understood the rational for the diagnosis and treatment plan. All questions were answered to best of my ability and the patient's satisfaction.      [place] Crowder MD  [facility]  [facility]  Answers for HPI/ROS submitted by the patient on 7/13/2017   If you checked off any problems, how difficult have these problems made it for you to do your work, take care of things at home, or get along with other people?: Not difficult at all  PHQ9 TOTAL SCORE: 1  FANTA 7 TOTAL SCORE: 0

## 2017-07-13 ENCOUNTER — OFFICE VISIT (OUTPATIENT)
Dept: FAMILY MEDICINE | Facility: CLINIC | Age: 47
End: 2017-07-13
Payer: COMMERCIAL

## 2017-07-13 VITALS
SYSTOLIC BLOOD PRESSURE: 128 MMHG | RESPIRATION RATE: 16 BRPM | DIASTOLIC BLOOD PRESSURE: 82 MMHG | TEMPERATURE: 97.8 F | HEART RATE: 80 BPM | WEIGHT: 174.3 LBS | BODY MASS INDEX: 23.97 KG/M2

## 2017-07-13 DIAGNOSIS — F41.9 ANXIETY: Primary | ICD-10-CM

## 2017-07-13 DIAGNOSIS — F51.04 PSYCHOPHYSIOLOGICAL INSOMNIA: ICD-10-CM

## 2017-07-13 DIAGNOSIS — Z01.84 IMMUNITY STATUS TESTING: ICD-10-CM

## 2017-07-13 PROCEDURE — 86765 RUBEOLA ANTIBODY: CPT | Performed by: FAMILY MEDICINE

## 2017-07-13 PROCEDURE — 99213 OFFICE O/P EST LOW 20 MIN: CPT | Performed by: FAMILY MEDICINE

## 2017-07-13 PROCEDURE — 36415 COLL VENOUS BLD VENIPUNCTURE: CPT | Performed by: FAMILY MEDICINE

## 2017-07-13 ASSESSMENT — ANXIETY QUESTIONNAIRES
4. TROUBLE RELAXING: NOT AT ALL
2. NOT BEING ABLE TO STOP OR CONTROL WORRYING: NOT AT ALL
GAD7 TOTAL SCORE: 0
GAD7 TOTAL SCORE: 0
3. WORRYING TOO MUCH ABOUT DIFFERENT THINGS: NOT AT ALL
7. FEELING AFRAID AS IF SOMETHING AWFUL MIGHT HAPPEN: NOT AT ALL
5. BEING SO RESTLESS THAT IT IS HARD TO SIT STILL: NOT AT ALL
1. FEELING NERVOUS, ANXIOUS, OR ON EDGE: NOT AT ALL
GAD7 TOTAL SCORE: 0
7. FEELING AFRAID AS IF SOMETHING AWFUL MIGHT HAPPEN: NOT AT ALL
6. BECOMING EASILY ANNOYED OR IRRITABLE: NOT AT ALL

## 2017-07-13 ASSESSMENT — PAIN SCALES - GENERAL: PAINLEVEL: NO PAIN (0)

## 2017-07-13 ASSESSMENT — PATIENT HEALTH QUESTIONNAIRE - PHQ9
SUM OF ALL RESPONSES TO PHQ QUESTIONS 1-9: 1
10. IF YOU CHECKED OFF ANY PROBLEMS, HOW DIFFICULT HAVE THESE PROBLEMS MADE IT FOR YOU TO DO YOUR WORK, TAKE CARE OF THINGS AT HOME, OR GET ALONG WITH OTHER PEOPLE: NOT DIFFICULT AT ALL
SUM OF ALL RESPONSES TO PHQ QUESTIONS 1-9: 1

## 2017-07-13 NOTE — NURSING NOTE
"Chief Complaint   Patient presents with     Anxiety     Lipids     Panel Management     Lipids, PHQ-9/FANTA       Initial /82  Pulse 80  Temp 97.8  F (36.6  C) (Temporal)  Resp 16  Wt 174 lb 4.8 oz (79.1 kg)  BMI 23.97 kg/m2 Estimated body mass index is 23.97 kg/(m^2) as calculated from the following:    Height as of 5/1/17: 5' 11.5\" (1.816 m).    Weight as of this encounter: 174 lb 4.8 oz (79.1 kg).  Medication Reconciliation: complete  Shirley Preciado CMA (AAMA)    "

## 2017-07-13 NOTE — LETTER
July 14, 2017      Eloise Johnson  [address] [address]              Dear Eloise,    Enclosed is a copy of the antibody titer for Rubeola. You are now immune following the MMR vaccination. Please call with any questions or concerns and thank you for your confidence.            Sincerely,      [place] Crowder MD

## 2017-07-13 NOTE — MR AVS SNAPSHOT
After Visit Summary   7/13/2017    Eloise Johnson    MRN: [id number]           Patient Information     Date Of Birth          1970        Visit Information        Provider Department      7/13/2017 10:00 AM Fabian Crowder MD [facility]        Today's Diagnoses     Anxiety    -  1    Psychophysiological insomnia        Immunity status testing           Follow-ups after your visit        Follow-up notes from your care team     Return if symptoms worsen or fail to improve.      Who to contact     If you have questions or need follow up information about today's clinic visit or your schedule please contact [facility] directly at [phone].  Normal or non-critical lab and imaging results will be communicated to you by Manga Cortahart, letter or phone within 4 business days after the clinic has received the results. If you do not hear from us within 7 days, please contact the clinic through Manga Cortahart or phone. If you have a critical or abnormal lab result, we will notify you by phone as soon as possible.  Submit refill requests through SuperDerivatives or call your pharmacy and they will forward the refill request to us. Please allow 3 business days for your refill to be completed.          Additional Information About Your Visit        MyChart Information     SuperDerivatives gives you secure access to your electronic health record. If you see a primary care provider, you can also send messages to your care team and make appointments. If you have questions, please call your primary care clinic.  If you do not have a primary care provider, please call [phone] and they will assist you.        Care EveryWhere ID     This is your Care EveryWhere ID. This could be used by other organizations to access your [place] medical records  [id number]        Your Vitals Were     Pulse Temperature Respirations BMI (Body Mass Index)          80 97.8  F (36.6  C) (Temporal) 16 23.97 kg/m2         Blood Pressure  from Last 3 Encounters:   07/13/17 128/82   05/01/17 124/80   02/01/17 125/87    Weight from Last 3 Encounters:   07/13/17 174 lb 4.8 oz (79.1 kg)   05/01/17 170 lb (77.1 kg)   02/01/17 171 lb 14.4 oz (78 kg)              We Performed the Following     Rubeola Antibody IgG        Primary Care Provider    None Specified       No primary provider on file.        Equal Access to Services     [place] : Hadii aad ku hadasho Soomaali, waaxda luqadaha, qaybta kaalmada adeegyada, waxay marshallin lilia hagen . So [facility] [phone].    ATENCIÓN: Si benla ricardo, tiene a laughlin disposición servicios gratuitos de asistencia lingüística. Llame al [phone].    We comply with applicable federal civil rights laws and [place] laws. We do not discriminate on the basis of race, color, national origin, age, disability sex, sexual orientation or gender identity.            Thank you!     Thank you for choosing [facility]  for your care. Our goal is always to provide you with excellent care. Hearing back from our patients is one way we can continue to improve our services. Please take a few minutes to complete the written survey that you may receive in the mail after your visit with us. Thank you!             Your Updated Medication List - Protect others around you: Learn how to safely use, store and throw away your medicines at www.disposemymeds.org.          This list is accurate as of: 7/13/17 10:27 AM.  Always use your most recent med list.                   Brand Name Dispense Instructions for use Diagnosis    aspirin EC 81 MG EC tablet      Take 1 tablet (81 mg) by mouth every other day    Hyperlipidemia LDL goal < 160       atorvastatin 10 MG tablet    LIPITOR    90 tablet    Take 1 tablet (10 mg) by mouth daily    Mixed hyperlipidemia       Multi-vitamin Tabs tablet   Generic drug:  multivitamin, therapeutic with minerals      Take 1 tablet by mouth daily.        omega-3 fatty acids 1200 MG  capsule      Take 2 capsules by mouth daily.        vitamin D 1000 UNITS capsule      Take 1 capsule by mouth daily

## 2017-07-14 LAB — MEV IGG SER QL IA: 1.8 AI (ref 0–0.8)

## 2017-07-14 ASSESSMENT — ANXIETY QUESTIONNAIRES: GAD7 TOTAL SCORE: 0

## 2017-07-14 ASSESSMENT — PATIENT HEALTH QUESTIONNAIRE - PHQ9: SUM OF ALL RESPONSES TO PHQ QUESTIONS 1-9: 1

## 2018-01-12 ENCOUNTER — TRANSFERRED RECORDS (OUTPATIENT)
Dept: HEALTH INFORMATION MANAGEMENT | Facility: CLINIC | Age: 48
End: 2018-01-12

## 2018-01-24 ENCOUNTER — OFFICE VISIT (OUTPATIENT)
Dept: OPTOMETRY | Facility: CLINIC | Age: 48
End: 2018-01-24
Payer: COMMERCIAL

## 2018-01-24 DIAGNOSIS — H52.223 REGULAR ASTIGMATISM OF BOTH EYES: ICD-10-CM

## 2018-01-24 DIAGNOSIS — H52.13 HIGH MYOPIA, BOTH EYES: Primary | ICD-10-CM

## 2018-01-24 DIAGNOSIS — H52.4 PRESBYOPIA: ICD-10-CM

## 2018-01-24 DIAGNOSIS — M35.00 SICCA SYNDROME (H): ICD-10-CM

## 2018-01-24 PROCEDURE — 92015 DETERMINE REFRACTIVE STATE: CPT | Performed by: OPTOMETRIST

## 2018-01-24 PROCEDURE — 92014 COMPRE OPH EXAM EST PT 1/>: CPT | Performed by: OPTOMETRIST

## 2018-01-24 RX ORDER — CYCLOSPORINE 0.5 MG/ML
1 EMULSION OPHTHALMIC 2 TIMES DAILY
Qty: 2 BOX | Refills: 11 | Status: SHIPPED | OUTPATIENT
Start: 2018-01-24 | End: 2018-07-10

## 2018-01-24 ASSESSMENT — REFRACTION
OD_AXIS: 085
OD_SPHERE: -10.50
OD_SPHERE: -11.00
OD_CYLINDER: +0.75
OD_CYLINDER: +1.75
OS_AXIS: 077
OD_AXIS: 085
OS_SPHERE: -9.50
OS_CYLINDER: +0.25

## 2018-01-24 ASSESSMENT — TEAR MENISCUS
OS_TEAR_MENISCUS: DECREASED
OD_TEAR_MENISCUS: DECREASED

## 2018-01-24 ASSESSMENT — REFRACTION_WEARINGRX
OS_SPHERE: -10.50
OS_ADD: +1.75
OD_AXIS: 085
OD_SPHERE: -10.75
OS_SPHERE: -10.50
OS_CYLINDER: +1.25
OS_AXIS: 100
OD_AXIS: 080
OD_CYLINDER: +2.00
OD_CYLINDER: +1.50
OD_ADD: +1.75
OS_ADD: +1.25
OD_SPHERE: -11.25
OS_CYLINDER: +1.25
SPECS_TYPE: AUTO/PAL
OD_ADD: +1.25
OS_AXIS: 100

## 2018-01-24 ASSESSMENT — VISUAL ACUITY
OD_CC: 20/25
OS_SC: 20/400-
OS_CC: 20/25-2
METHOD: SNELLEN - LINEAR
OD_SC: 20/400-
OD_CC: 20/20-2
CORRECTION_TYPE: GLASSES
OS_CC+: -2
OS_CC: 20/25
OD_CC+: -2

## 2018-01-24 ASSESSMENT — EXTERNAL EXAM - LEFT EYE: OS_EXAM: NORMAL

## 2018-01-24 ASSESSMENT — PUNCTA - ASSESSMENT
OD_PUNCTA: NORMAL
OS_PUNCTA: NORMAL

## 2018-01-24 ASSESSMENT — CUP TO DISC RATIO
OD_RATIO: 0.3
OS_RATIO: 0.3

## 2018-01-24 ASSESSMENT — REFRACTION_MANIFEST
OS_AXIS: 100
OS_CYLINDER: +0.50
OD_AXIS: 085
OS_ADD: +1.75
OD_CYLINDER: +0.75
OS_SPHERE: -9.50
OD_ADD: +1.75
OD_SPHERE: -10.50

## 2018-01-24 ASSESSMENT — SLIT LAMP EXAM - LIDS
COMMENTS: MEIBOMIAN GLAND DYSFUNCTION
COMMENTS: MEIBOMIAN GLAND DYSFUNCTION

## 2018-01-24 ASSESSMENT — TONOMETRY
IOP_METHOD: TONOPEN
OD_IOP_MMHG: 12
OS_IOP_MMHG: 14

## 2018-01-24 ASSESSMENT — CONF VISUAL FIELD
OS_NORMAL: 1
OD_NORMAL: 1

## 2018-01-24 ASSESSMENT — EXTERNAL EXAM - RIGHT EYE: OD_EXAM: NORMAL

## 2018-01-24 NOTE — MR AVS SNAPSHOT
After Visit Summary   1/24/2018    Eloise Johnson    MRN: [id number]           Patient Information     Date Of Birth          1970        Visit Information        Provider Department      1/24/2018 3:40 PM Ar Medina, BISI [facility]        Today's Diagnoses     High myopia, both eyes    -  1    Presbyopia        Regular astigmatism of both eyes        Sicca syndrome (H)          Care Instructions    Recommend new glasses.    Use 1 drop of Restasis in both eyes in the morning and 1 drop both eyes in the evening.  The drop is cloudy colored and will make your vision a little blurrry after insertion.  The drop may sting a little when you put it in which is normal for some people.  Do not touch the tip of the dropper to anything which contaminates the drop.    Return in 2 months for recheck on dry eyes or sooner if needed.    Return in 1 year for a complete eye exam or sooner if needed.    Ar Medina OD            Follow-ups after your visit        Follow-up notes from your care team     Return in about 2 months (around 3/24/2018), or if symptoms worsen or fail to improve, for Follow Up.      Who to contact     If you have questions or need follow up information about today's clinic visit or your schedule please contact [facility] directly at [phone].  Normal or non-critical lab and imaging results will be communicated to you by MyChart, letter or phone within 4 business days after the clinic has received the results. If you do not hear from us within 7 days, please contact the clinic through Kingdom Brewerieshart or phone. If you have a critical or abnormal lab result, we will notify you by phone as soon as possible.  Submit refill requests through Evernote or call your pharmacy and they will forward the refill request to us. Please allow 3 business days for your refill to be completed.          Additional Information About Your Visit        MyChart Information     Evernote gives  you secure access to your electronic health record. If you see a primary care provider, you can also send messages to your care team and make appointments. If you have questions, please call your primary care clinic.  If you do not have a primary care provider, please call [phone] and they will assist you.      UsingMiles is an electronic gateway that provides easy, online access to your medical records. With UsingMiles, you can request a clinic appointment, read your test results, renew a prescription or communicate with your care team.     To access your existing account, please contact your [facility] or call [phone] for assistance.        Care EveryWhere ID     This is your Care EveryWhere ID. This could be used by other organizations to access your [place] medical records  [id number]         Blood Pressure from Last 3 Encounters:   07/13/17 128/82   05/01/17 124/80   02/01/17 125/87    Weight from Last 3 Encounters:   07/13/17 79.1 kg (174 lb 4.8 oz)   05/01/17 77.1 kg (170 lb)   02/01/17 78 kg (171 lb 14.4 oz)              We Performed the Following     EYE EXAM (SIMPLE-NONBILLABLE)     REFRACTION          Today's Medication Changes          These changes are accurate as of 1/24/18  5:08 PM.  If you have any questions, ask your nurse or doctor.               Start taking these medicines.        Dose/Directions    cycloSPORINE 0.05 % ophthalmic emulsion   Commonly known as:  RESTASIS   Used for:  Sicca syndrome (H)   Started by:  Ar Medina, OD        Dose:  1 drop   Place 1 drop into both eyes 2 times daily   Quantity:  2 Box   Refills:  11            Where to get your medicines      These medications were sent to [facility] - [place], [place] - [address], [address]  [address], [address], [facility] [address]     Phone:  [phone]     cycloSPORINE 0.05 % ophthalmic emulsion                Primary Care Provider Office Phone # Fax #     Fabian Crowder [phone]       [address] [facility] [address]        Equal Access to Services     [place] : Hadii shravan marks hollie Perez, washebada luqmaik, qatorresta kaalmada stephanie, raphael yoyohan jose eduardo. So [facility] [phone].    ATENCIÓN: Si habla español, tiene a laughlin disposición servicios gratuitos de asistencia lingüística. Llame al [phone].    We comply with applicable federal civil rights laws and [place] laws. We do not discriminate on the basis of race, color, national origin, age, disability, sex, sexual orientation, or gender identity.            Thank you!     Thank you for choosing [facility]  for your care. Our goal is always to provide you with excellent care. Hearing back from our patients is one way we can continue to improve our services. Please take a few minutes to complete the written survey that you may receive in the mail after your visit with us. Thank you!             Your Updated Medication List - Protect others around you: Learn how to safely use, store and throw away your medicines at www.disposemymeds.org.          This list is accurate as of 1/24/18  5:08 PM.  Always use your most recent med list.                   Brand Name Dispense Instructions for use Diagnosis    aspirin EC 81 MG EC tablet      Take 1 tablet (81 mg) by mouth every other day    Hyperlipidemia LDL goal < 160       atorvastatin 10 MG tablet    LIPITOR    90 tablet    Take 1 tablet (10 mg) by mouth daily    Mixed hyperlipidemia       cycloSPORINE 0.05 % ophthalmic emulsion    RESTASIS    2 Box    Place 1 drop into both eyes 2 times daily    Sicca syndrome (H)       Multi-vitamin Tabs tablet   Generic drug:  multivitamin, therapeutic with minerals      Take 1 tablet by mouth daily.        omega-3 fatty acids 1200 MG capsule      Take 2 capsules by mouth daily.        vitamin D 1000 UNITS capsule      Take 1 capsule by mouth daily

## 2018-01-24 NOTE — PROGRESS NOTES
Chief Complaint   Patient presents with     COMPREHENSIVE EYE EXAM     dry eyes x 1-2 years         Last Eye Exam: 10-5-2016  Dilated Previously: Yes    What are you currently using to see?  glasses       Distance Vision Acuity: Noticed gradual change in both eyes    Near Vision Acuity: Not satisfied ,has to take off glasses, eyes get tired fast when reading     Eye Comfort: dry  Do you use eye drops? : Yes: eye relief eye drops  Occupation or Hobbies:  Self employed business    Cydney Cavanaugh Optometric Assistant, A.B[employer]          Medical, surgical and family histories reviewed and updated 1/24/2018.       OBJECTIVE: See Ophthalmology exam    ASSESSMENT:    ICD-10-CM    1. High myopia, both eyes H52.13 REFRACTION   2. Presbyopia H52.4 REFRACTION   3. Regular astigmatism of both eyes H52.223    4. Sicca syndrome (H) M35.00 EYE EXAM (SIMPLE-NONBILLABLE)     cycloSPORINE (RESTASIS) 0.05 % ophthalmic emulsion      PLAN:     Patient Instructions   Recommend new glasses.    Use 1 drop of Restasis in both eyes in the morning and 1 drop both eyes in the evening.  The drop is cloudy colored and will make your vision a little blurrry after insertion.  The drop may sting a little when you put it in which is normal for some people.  Do not touch the tip of the dropper to anything which contaminates the drop.    Return in 2 months for recheck on dry eyes or sooner if needed.    Return in 1 year for a complete eye exam or sooner if needed.    Ar Medina, OD

## 2018-01-24 NOTE — LETTER
1/24/2018         RE: Eloise Johnson  [address] [address]        Dear Colleague,    Thank you for referring your patient, Eloise Johnson, to the [facility]. Please see a copy of my visit note below.    Chief Complaint   Patient presents with     COMPREHENSIVE EYE EXAM     dry eyes x 1-2 years         Last Eye Exam: 10-5-2016  Dilated Previously: Yes    What are you currently using to see?  glasses       Distance Vision Acuity: Noticed gradual change in both eyes    Near Vision Acuity: Not satisfied ,has to take off glasses, eyes get tired fast when reading     Eye Comfort: dry  Do you use eye drops? : Yes: eye relief eye drops  Occupation or Hobbies:  Self employed business    Cydney Cavanaugh Optometric Assistant, A.B.O[employer]C.          Medical, surgical and family histories reviewed and updated 1/24/2018.       OBJECTIVE: See Ophthalmology exam    ASSESSMENT:    ICD-10-CM    1. High myopia, both eyes H52.13 REFRACTION   2. Presbyopia H52.4 REFRACTION   3. Regular astigmatism of both eyes H52.223    4. Sicca syndrome (H) M35.00 EYE EXAM (SIMPLE-NONBILLABLE)     cycloSPORINE (RESTASIS) 0.05 % ophthalmic emulsion      PLAN:     Patient Instructions   Recommend new glasses.    Use 1 drop of Restasis in both eyes in the morning and 1 drop both eyes in the evening.  The drop is cloudy colored and will make your vision a little blurrry after insertion.  The drop may sting a little when you put it in which is normal for some people.  Do not touch the tip of the dropper to anything which contaminates the drop.    Return in 2 months for recheck on dry eyes or sooner if needed.    Return in 1 year for a complete eye exam or sooner if needed.    Ar Medina, BISI           Again, thank you for allowing me to participate in the care of your patient.        Sincerely,        Ar Medina, OD

## 2018-01-24 NOTE — PATIENT INSTRUCTIONS
Recommend new glasses.    Use 1 drop of Restasis in both eyes in the morning and 1 drop both eyes in the evening.  The drop is cloudy colored and will make your vision a little blurrry after insertion.  The drop may sting a little when you put it in which is normal for some people.  Do not touch the tip of the dropper to anything which contaminates the drop.    Return in 2 months for recheck on dry eyes or sooner if needed.    Return in 1 year for a complete eye exam or sooner if needed.    Ar Medina, OD

## 2018-02-16 ENCOUNTER — DOCUMENTATION ONLY (OUTPATIENT)
Dept: CARDIOLOGY | Facility: CLINIC | Age: 48
End: 2018-02-16

## 2018-06-06 ENCOUNTER — ALLIED HEALTH/NURSE VISIT (OUTPATIENT)
Dept: FAMILY MEDICINE | Facility: CLINIC | Age: 48
End: 2018-06-06
Payer: COMMERCIAL

## 2018-06-06 ENCOUNTER — TELEPHONE (OUTPATIENT)
Dept: FAMILY MEDICINE | Facility: CLINIC | Age: 48
End: 2018-06-06

## 2018-06-06 DIAGNOSIS — Z11.1 SCREENING EXAMINATION FOR PULMONARY TUBERCULOSIS: Primary | ICD-10-CM

## 2018-06-06 PROCEDURE — 99207 ZZC NO CHARGE NURSE ONLY: CPT

## 2018-06-06 PROCEDURE — 86580 TB INTRADERMAL TEST: CPT

## 2018-06-06 NOTE — TELEPHONE ENCOUNTER
Please place the letter at the  for .    Fabian Crowder MD  Please close encounter when call/work completed.

## 2018-06-06 NOTE — LETTER
June 6, 2018    To Whom It Concerns:    RE:  Ludwigabram   [address]  [facility] [place] [address]      This is to certify that full immunity has been confirmed as of July 2017 for rubella, rubeola, varicella and mumps.    He is requesting not to receive the hepatitis B vaccination series.           Fabian Crowder MD

## 2018-06-06 NOTE — NURSING NOTE
The patient is asked the following questions today and these are his answers:    -Have you had a mantoux administered in the past 30 days?    No  -Have you had a previous positive Mantoux.  No  -Have you received BCG in the past.  No  -Have you had a live vaccine  (MMR, Varicella, OPV, Yellow Fever) in the last 6 weeks.  No  -Have you had and active  viral or bacterial infection in the past 6 weeks.  No  -Have you received corticosteroids or immunosuppressive agents in the past 6 weeks.  No  -Have you been diagnosed with HIV?  No  -Do you have a maglinancy?  No     Patient was advised to return back 48-72 hours for a read. Patient scheduled for this Friday for a read.     Figueroa Owusu MA

## 2018-06-06 NOTE — TELEPHONE ENCOUNTER
Pt is applying for a job for an [employer] company and needs a letter that states:  1. He has chosen not to receive the Hepatitis B vaccine series.  2. Received MMR 5/25/2017 and lab testing:        Haley Ya RN, BSN

## 2018-06-06 NOTE — MR AVS SNAPSHOT
After Visit Summary   6/6/2018    Eloise Johnson    MRN: [id number]           Patient Information     Date Of Birth          1970        Visit Information        Provider Department      6/6/2018 4:00 PM [facility]        Today's Diagnoses     Screening examination for pulmonary tuberculosis    -  1       Follow-ups after your visit        Your next 10 appointments already scheduled     Jun 08, 2018  4:00 PM CDT   Nurse Only with RG RN   [facility]    [address], [address]  [place] [address] [phone]              Who to contact     If you have questions or need follow up information about today's clinic visit or your schedule please contact [facility] directly at [phone].  Normal or non-critical lab and imaging results will be communicated to you by MyChart, letter or phone within 4 business days after the clinic has received the results. If you do not hear from us within 7 days, please contact the clinic through MyChart or phone. If you have a critical or abnormal lab result, we will notify you by phone as soon as possible.  Submit refill requests through Sample6 or call your pharmacy and they will forward the refill request to us. Please allow 3 business days for your refill to be completed.          Additional Information About Your Visit        MyChart Information     Sample6 gives you secure access to your electronic health record. If you see a primary care provider, you can also send messages to your care team and make appointments. If you have questions, please call your primary care clinic.  If you do not have a primary care provider, please call [phone] and they will assist you.        Care EveryWhere ID     This is your Care EveryWhere ID. This could be used by other organizations to access your [place] medical records  [id number]         Blood Pressure from Last 3 Encounters:   07/13/17  128/82   05/01/17 124/80   02/01/17 125/87    Weight from Last 3 Encounters:   07/13/17 174 lb 4.8 oz (79.1 kg)   05/01/17 170 lb (77.1 kg)   02/01/17 171 lb 14.4 oz (78 kg)              We Performed the Following     TB INTRADERMAL TEST        Primary Care Provider Office Phone # Fax #    Fabianrachid Crowder [phone] [address] [address]        Equal Access to Services     [facility]: Hadii aad ku hadasho Soomaali, waaxda luqadaha, qaybta kaalmada adeegyada, waxay marshallin haydaly hagen . So [facility] [phone].    ATENCIÓN: Si habla español, tiene a laughlin disposición servicios gratuitos de asistencia lingüística. Ll[facility] [phone].    We comply with applicable federal civil rights laws and [place] laws. We do not discriminate on the basis of race, color, national origin, age, disability, sex, sexual orientation, or gender identity.            Thank you!     Thank you for choosing [facility]  for your care. Our goal is always to provide you with excellent care. Hearing back from our patients is one way we can continue to improve our services. Please take a few minutes to complete the written survey that you may receive in the mail after your visit with us. Thank you!             Your Updated Medication List - Protect others around you: Learn how to safely use, store and throw away your medicines at www.disposemymeds.org.          This list is accurate as of 6/6/18  4:56 PM.  Always use your most recent med list.                   Brand Name Dispense Instructions for use Diagnosis    aspirin 81 MG EC tablet      Take 1 tablet (81 mg) by mouth every other day    Hyperlipidemia LDL goal < 160       atorvastatin 10 MG tablet    LIPITOR    90 tablet    Take 1 tablet (10 mg) by mouth daily    Mixed hyperlipidemia       cycloSPORINE 0.05 % ophthalmic emulsion    RESTASIS    2 Box    Place 1 drop into both eyes 2 times daily    Sicca syndrome (H)        Multi-vitamin Tabs tablet   Generic drug:  multivitamin, therapeutic with minerals      Take 1 tablet by mouth daily.        omega-3 fatty acids 1200 MG capsule      Take 2 capsules by mouth daily.        vitamin D 1000 units capsule      Take 1 capsule by mouth daily

## 2018-06-06 NOTE — TELEPHONE ENCOUNTER
Reason for Call:  Other letter    Detailed comments: patient needs a letter stating her waives getting the Hep B injections. Patient will  at the  when ready, please call patient when ready.    Phone Number Patient can be reached at: Home number on file [phone] (home) or Cell number on file:    Telephone Information:   Mobile [phone]       Best Time: anytime    Can we leave a detailed message on this number? YES    Call taken on 6/6/2018 at 9:19 AM by Krystle Vega

## 2018-06-08 ENCOUNTER — ALLIED HEALTH/NURSE VISIT (OUTPATIENT)
Dept: FAMILY MEDICINE | Facility: CLINIC | Age: 48
End: 2018-06-08
Payer: COMMERCIAL

## 2018-06-08 DIAGNOSIS — Z11.1 SCREENING EXAMINATION FOR PULMONARY TUBERCULOSIS: Primary | ICD-10-CM

## 2018-06-08 LAB
PPDINDURATION: 0 MM (ref 0–5)
PPDREDNESS: 3 MM

## 2018-06-08 PROCEDURE — 99207 ZZC NO CHARGE NURSE ONLY: CPT

## 2018-06-08 NOTE — LETTER
[facility] [place]  [address]  [place] [address]  [phone]          6/8/2018          To Whom it May Concern:     Eloise Johnson, male, 1970 has had a mantoux on was place on 06/06/2018 at 4:31pm time on left forearm. Read today 06/08/2018 at 4:49pm. .      Mantoux result is NEGATIVE:  Lab Results   Component Value Date    PPDREDNESS 3 06/08/2018    PPDINDURATIO 0 06/08/2018         Please contact me for questions or concerns.        Sincerely,      Dr Vel MD/ Stephanie Camacho, RN, BSN

## 2018-06-08 NOTE — PROGRESS NOTES
Eloise He is here for Mantoux read.  Mantoux was place on 06/06/2018 at 4:31pm time on left forearm. Read today 06/08/2018 at 4:49pm.     Mantoux result:  Lab Results   Component Value Date    PPDREDNESS 3 06/08/2018    PPDINDURATIO 0 06/08/2018       Stephanie Camacho RN, BSN

## 2018-06-08 NOTE — MR AVS SNAPSHOT
After Visit Summary   6/8/2018    Eloise Johnson    MRN: [id number]           Patient Information     Date Of Birth          1970        Visit Information        Provider Department      6/8/2018 4:00 PM RG RN [place] Chandrika Mendoza        Today's Diagnoses     Screening examination for pulmonary tuberculosis    -  1       Follow-ups after your visit        Who to contact     If you have questions or need follow up information about today's clinic visit or your schedule please contact [facility] directly at [phone].  Normal or non-critical lab and imaging results will be communicated to you by MyChart, letter or phone within 4 business days after the clinic has received the results. If you do not hear from us within 7 days, please contact the clinic through [employer] e[employer]merce[employer] or phone. If you have a critical or abnormal lab result, we will notify you by phone as soon as possible.  Submit refill requests through vufind or call your pharmacy and they will forward the refill request to us. Please allow 3 business days for your refill to be completed.          Additional Information About Your Visit        MyChart Information     vufind gives you secure access to your electronic health record. If you see a primary care provider, you can also send messages to your care team and make appointments. If you have questions, please call your primary care clinic.  If you do not have a primary care provider, please call [phone] and they will assist you.        Care EveryWhere ID     This is your Care EveryWhere ID. This could be used by other organizations to access your [facility] medical records  [id number]         Blood Pressure from Last 3 Encounters:   07/13/17 128/82   05/01/17 124/80   02/01/17 125/87    Weight from Last 3 Encounters:   07/13/17 174 lb 4.8 oz (79.1 kg)   05/01/17 170 lb (77.1 kg)   02/01/17 171 lb 14.4 oz (78 kg)              Today, you had the following     No orders found for  display       Primary Care Provider Office Phone # Fax #    Fabian Crowder [phone] [address] [facility] [address]        Equal Access to Services     LULÚ HILL : Hadandrew marks twilao Soomaali, waaxda luqadaha, qaybta kaalmada adeconstantino, raphael osorio laDivinedaly whitt. So [facility] [phone].    ATENCIÓN: Si habla español, tiene a laughlin disposición servicios gratuitos de asistencia lingüística. [facility] [phone].    We comply with applicable federal civil rights laws and [place] laws. We do not discriminate on the basis of race, color, national origin, age, disability, sex, sexual orientation, or gender identity.            Thank you!     Thank you for choosing [facility]  for your care. Our goal is always to provide you with excellent care. Hearing back from our patients is one way we can continue to improve our services. Please take a few minutes to complete the written survey that you may receive in the mail after your visit with us. Thank you!             Your Updated Medication List - Protect others around you: Learn how to safely use, store and throw away your medicines at www.disposemymeds.org.          This list is accurate as of 6/8/18  4:51 PM.  Always use your most recent med list.                   Brand Name Dispense Instructions for use Diagnosis    aspirin 81 MG EC tablet      Take 1 tablet (81 mg) by mouth every other day    Hyperlipidemia LDL goal < 160       atorvastatin 10 MG tablet    LIPITOR    90 tablet    Take 1 tablet (10 mg) by mouth daily    Mixed hyperlipidemia       cycloSPORINE 0.05 % ophthalmic emulsion    RESTASIS    2 Box    Place 1 drop into both eyes 2 times daily    Sicca syndrome (H)       Multi-vitamin Tabs tablet   Generic drug:  multivitamin, therapeutic with minerals      Take 1 tablet by mouth daily.        omega-3 fatty acids 1200 MG capsule      Take 2 capsules by mouth daily.        vitamin D 1000 units  capsule      Take 1 capsule by mouth daily

## 2018-06-22 ENCOUNTER — TELEPHONE (OUTPATIENT)
Dept: CARDIOLOGY | Facility: CLINIC | Age: 48
End: 2018-06-22

## 2018-06-22 NOTE — TELEPHONE ENCOUNTER
Attempted to contact patient to remind his of overdue f/u with TOMASA due last February. Offered patient either TOMASA f/u or Dr. Snyder in October. Updated patient that July 10 has recently been opened for Dr. Snyder.

## 2018-06-25 ENCOUNTER — TELEPHONE (OUTPATIENT)
Dept: CARDIOLOGY | Facility: CLINIC | Age: 48
End: 2018-06-25

## 2018-06-25 DIAGNOSIS — E78.2 MIXED HYPERLIPIDEMIA: Chronic | ICD-10-CM

## 2018-06-25 RX ORDER — ATORVASTATIN CALCIUM 10 MG/1
10 TABLET, FILM COATED ORAL DAILY
Qty: 90 TABLET | Refills: 3 | Status: SHIPPED | OUTPATIENT
Start: 2018-06-25 | End: 2018-07-10

## 2018-06-25 NOTE — TELEPHONE ENCOUNTER
Patient called left message that he will be having his annual labs done a few days before OV with Dr. Snyder 7-10-18 at the [facility].

## 2018-06-25 NOTE — TELEPHONE ENCOUNTER
Received refill request for:  atorvastatin  Last OV was: 2/1/2017 with Dr. Snyder  Labs/EKG: last lipid 1/12/2017  F/U scheduled: 7/10/2018 with Dr. Snyder  New script sent to: [facility]

## 2018-06-28 ENCOUNTER — PRE VISIT (OUTPATIENT)
Dept: CARDIOLOGY | Facility: CLINIC | Age: 48
End: 2018-06-28

## 2018-06-28 DIAGNOSIS — I10 BENIGN ESSENTIAL HYPERTENSION: Primary | ICD-10-CM

## 2018-06-28 DIAGNOSIS — E78.2 MIXED HYPERLIPIDEMIA: ICD-10-CM

## 2018-07-06 DIAGNOSIS — E78.2 MIXED HYPERLIPIDEMIA: ICD-10-CM

## 2018-07-06 DIAGNOSIS — I10 BENIGN ESSENTIAL HYPERTENSION: ICD-10-CM

## 2018-07-06 LAB
ANION GAP SERPL CALCULATED.3IONS-SCNC: 7 MMOL/L (ref 3–14)
BUN SERPL-MCNC: 13 MG/DL (ref 7–30)
CALCIUM SERPL-MCNC: 8.9 MG/DL (ref 8.5–10.1)
CHLORIDE SERPL-SCNC: 107 MMOL/L (ref 94–109)
CHOLEST SERPL-MCNC: 171 MG/DL
CO2 SERPL-SCNC: 29 MMOL/L (ref 20–32)
CREAT SERPL-MCNC: 0.81 MG/DL (ref 0.66–1.25)
GFR SERPL CREATININE-BSD FRML MDRD: >90 ML/MIN/1.7M2
GLUCOSE SERPL-MCNC: 97 MG/DL (ref 70–99)
HDLC SERPL-MCNC: 33 MG/DL
LDLC SERPL CALC-MCNC: 80 MG/DL
NONHDLC SERPL-MCNC: 138 MG/DL
POTASSIUM SERPL-SCNC: 3.9 MMOL/L (ref 3.4–5.3)
SODIUM SERPL-SCNC: 143 MMOL/L (ref 133–144)
TRIGL SERPL-MCNC: 290 MG/DL

## 2018-07-06 PROCEDURE — 80061 LIPID PANEL: CPT | Performed by: INTERNAL MEDICINE

## 2018-07-06 PROCEDURE — 80048 BASIC METABOLIC PNL TOTAL CA: CPT | Performed by: INTERNAL MEDICINE

## 2018-07-06 PROCEDURE — 36415 COLL VENOUS BLD VENIPUNCTURE: CPT | Performed by: INTERNAL MEDICINE

## 2018-07-10 ENCOUNTER — OFFICE VISIT (OUTPATIENT)
Dept: CARDIOLOGY | Facility: CLINIC | Age: 48
End: 2018-07-10
Attending: INTERNAL MEDICINE
Payer: COMMERCIAL

## 2018-07-10 VITALS
SYSTOLIC BLOOD PRESSURE: 142 MMHG | BODY MASS INDEX: 27.16 KG/M2 | HEART RATE: 80 BPM | WEIGHT: 194 LBS | HEIGHT: 71 IN | DIASTOLIC BLOOD PRESSURE: 89 MMHG

## 2018-07-10 DIAGNOSIS — Z82.49 FAMILY HISTORY OF EARLY CAD: ICD-10-CM

## 2018-07-10 DIAGNOSIS — E78.2 MIXED HYPERLIPIDEMIA: Chronic | ICD-10-CM

## 2018-07-10 DIAGNOSIS — E78.6 HDL DEFICIENCY: Chronic | ICD-10-CM

## 2018-07-10 DIAGNOSIS — I42.9 CARDIOMYOPATHY OF UNDETERMINED TYPE (H): Chronic | ICD-10-CM

## 2018-07-10 DIAGNOSIS — R53.82 CHRONIC FATIGUE: Primary | ICD-10-CM

## 2018-07-10 PROCEDURE — 99214 OFFICE O/P EST MOD 30 MIN: CPT | Performed by: INTERNAL MEDICINE

## 2018-07-10 RX ORDER — ATORVASTATIN CALCIUM 10 MG/1
10 TABLET, FILM COATED ORAL DAILY
Qty: 90 TABLET | Refills: 3
Start: 2018-07-10 | End: 2019-07-26

## 2018-07-10 NOTE — LETTER
7/10/2018    Fabian Crowder [place]  [address] [facility] [address]    RE: Eloise Johnson       Dear Colleague,    I had the pleasure of seeing Eloise Johnson in the [facility].    HPI and Plan:   See dictation    Orders Placed This Encounter   Procedures     Lipid Profile     ALT     SLEEP EVALUATION & MANAGEMENT REFERRAL - [facility] [phone]  (Age 18 and up)     Follow-Up with Cardiac Advanced Practice Provider     Follow-Up with Cardiologist       Orders Placed This Encounter   Medications     atorvastatin (LIPITOR) 10 MG tablet     Sig: Take 1 tablet (10 mg) by mouth daily     Dispense:  90 tablet     Refill:  3       Medications Discontinued During This Encounter   Medication Reason     atorvastatin (LIPITOR) 10 MG tablet Reorder     cycloSPORINE (RESTASIS) 0.05 % ophthalmic emulsion          Encounter Diagnoses   Name Primary?     Mixed hyperlipidemia      Chronic fatigue Yes     HDL deficiency      Cardiomyopathy of undetermined type (H)      Family history of early CAD        CURRENT MEDICATIONS:  Current Outpatient Prescriptions   Medication Sig Dispense Refill     aspirin EC 81 MG tablet Take 1 tablet (81 mg) by mouth every other day       atorvastatin (LIPITOR) 10 MG tablet Take 1 tablet (10 mg) by mouth daily 90 tablet 3     Multiple Vitamin (MULTI-VITAMIN) per tablet Take 1 tablet by mouth daily.       omega-3 fatty acids 1200 MG capsule Take 2 capsules by mouth daily.       Cholecalciferol (VITAMIN D) 1000 UNITS capsule Take 1 capsule by mouth daily       [DISCONTINUED] atorvastatin (LIPITOR) 10 MG tablet Take 1 tablet (10 mg) by mouth daily (Patient not taking: Reported on 7/10/2018) 90 tablet 3       ALLERGIES   No Known Allergies    PAST MEDICAL HISTORY:  Past Medical History:   Diagnosis Date     Cardiomyopathy (H)     idiopathic nonischemic cardiomyopathy     Family history of early CAD      Hyperlipidemia        PAST SURGICAL  "HISTORY:  Past Surgical History:   Procedure Laterality Date     COLONOSCOPY WITH CO2 INSUFFLATION N/A 10/24/2014    Procedure: COLONOSCOPY WITH CO2 INSUFFLATION;  Surgeon: Duane, William Charles, MD;  Location:  OR      TOOTH EXTRACTION W/FORCEP         FAMILY HISTORY:  Family History   Problem Relation Age of Onset     C.A.D. Father      Diabetes Maternal Grandmother        SOCIAL HISTORY:  Social History     Social History     Marital status:      Spouse name: N/A     Number of children: N/A     Years of education: N/A     Social History Main Topics     Smoking status: Former Smoker     Packs/day: 1.00     Years: 2.00     Types: Cigarettes     Smokeless tobacco: Never Used     Alcohol use 0.5 - 1.0 oz/week     1 - 2 Standard drinks or equivalent per week      Comment: socially     Drug use: No     Sexual activity: Not Currently      Comment: not currently      Other Topics Concern     Caffeine Concern Yes     tea 1-2 per day     Sleep Concern No     Stress Concern No     Weight Concern No     Special Diet No     Exercise No     Seat Belt Yes     Social History Narrative       Review of Systems:  Skin:  Negative itching in ear   Eyes:  Positive for glasses    ENT:  Negative      Respiratory:  Negative for shortness of breath;dyspnea on exertion occasional   Cardiovascular:  Negative;chest pain;palpitations;lightheadedness;dizziness;syncope or near-syncope;cyanosis;exercise intolerance;edema Positive for;fatigue occ.  Gastroenterology: Negative nausea;heartburn;melena;hematochezia    Genitourinary:  Negative      Musculoskeletal:  Negative      Neurologic:  Negative headaches    Psychiatric:  Negative      Heme/Lymph/Imm:  Negative allergies    Endocrine:  Negative        Physical Exam:  Vitals: /89  Pulse 80  Ht 1.816 m (5' 11.5\")  Wt 88 kg (194 lb)  BMI 26.68 kg/m2    Constitutional:  cooperative, alert and oriented, well developed, well nourished, in no acute distress        Skin:  warm and " dry to the touch, no apparent skin lesions or masses noted          Head:  normocephalic, no masses or lesions        Eyes:  pupils equal and round;conjunctivae and lids unremarkable;sclera white;no xanthalasma;no nystagmus        Lymph:      ENT:  no pallor or cyanosis, dentition good        Neck:  carotid pulses are full and equal bilaterally;no carotid bruit        Respiratory:  normal breath sounds, clear to auscultation, normal A-P diameter, normal symmetry, normal respiratory excursion, no use of accessory muscles         Cardiac: regular rhythm;normal S1 and S2;no S3 or S4;no murmurs, gallops or rubs detected                pulses full and equal                                        GI:           Extremities and Muscular Skeletal:  no edema;no spinal abnormalities noted;normal muscle strength and tone              Neurological:  no gross motor deficits        Psych:  affect appropriate, oriented to time, person and place        CC  Marshal Snyder MD  [address]  [place] [address]                Thank you for allowing me to participate in the care of your patient.      Sincerely,     Marshal Snyder MD     [facility]    cc:   Marshal Snyder MD  [address]  [place] [address]

## 2018-07-10 NOTE — PROGRESS NOTES
HPI and Plan:   See dictation    Orders Placed This Encounter   Procedures     Lipid Profile     ALT     SLEEP EVALUATION & MANAGEMENT REFERRAL - [facility] [phone]  (Age 18 and up)     Follow-Up with Cardiac Advanced Practice Provider     Follow-Up with Cardiologist       Orders Placed This Encounter   Medications     atorvastatin (LIPITOR) 10 MG tablet     Sig: Take 1 tablet (10 mg) by mouth daily     Dispense:  90 tablet     Refill:  3       Medications Discontinued During This Encounter   Medication Reason     atorvastatin (LIPITOR) 10 MG tablet Reorder     cycloSPORINE (RESTASIS) 0.05 % ophthalmic emulsion          Encounter Diagnoses   Name Primary?     Mixed hyperlipidemia      Chronic fatigue Yes     HDL deficiency      Cardiomyopathy of undetermined type (H)      Family history of early CAD        CURRENT MEDICATIONS:  Current Outpatient Prescriptions   Medication Sig Dispense Refill     aspirin EC 81 MG tablet Take 1 tablet (81 mg) by mouth every other day       atorvastatin (LIPITOR) 10 MG tablet Take 1 tablet (10 mg) by mouth daily 90 tablet 3     Multiple Vitamin (MULTI-VITAMIN) per tablet Take 1 tablet by mouth daily.       omega-3 fatty acids 1200 MG capsule Take 2 capsules by mouth daily.       Cholecalciferol (VITAMIN D) 1000 UNITS capsule Take 1 capsule by mouth daily       [DISCONTINUED] atorvastatin (LIPITOR) 10 MG tablet Take 1 tablet (10 mg) by mouth daily (Patient not taking: Reported on 7/10/2018) 90 tablet 3       ALLERGIES   No Known Allergies    PAST MEDICAL HISTORY:  Past Medical History:   Diagnosis Date     Cardiomyopathy (H)     idiopathic nonischemic cardiomyopathy     Family history of early CAD      Hyperlipidemia        PAST SURGICAL HISTORY:  Past Surgical History:   Procedure Laterality Date     COLONOSCOPY WITH CO2 INSUFFLATION N/A 10/24/2014    Procedure: COLONOSCOPY WITH CO2 INSUFFLATION;  Surgeon: Duane, William Charles, MD;  Location:   "OR     HC TOOTH EXTRACTION W/FORCEP         FAMILY HISTORY:  Family History   Problem Relation Age of Onset     C.A.D. Father      Diabetes Maternal Grandmother        SOCIAL HISTORY:  Social History     Social History     Marital status:      Spouse name: N/A     Number of children: N/A     Years of education: N/A     Social History Main Topics     Smoking status: Former Smoker     Packs/day: 1.00     Years: 2.00     Types: Cigarettes     Smokeless tobacco: Never Used     Alcohol use 0.5 - 1.0 oz/week     1 - 2 Standard drinks or equivalent per week      Comment: socially     Drug use: No     Sexual activity: Not Currently      Comment: not currently      Other Topics Concern     Caffeine Concern Yes     tea 1-2 per day     Sleep Concern No     Stress Concern No     Weight Concern No     Special Diet No     Exercise No     Seat Belt Yes     Social History Narrative       Review of Systems:  Skin:  Negative itching in ear   Eyes:  Positive for glasses    ENT:  Negative      Respiratory:  Negative for shortness of breath;dyspnea on exertion occasional   Cardiovascular:  Negative;chest pain;palpitations;lightheadedness;dizziness;syncope or near-syncope;cyanosis;exercise intolerance;edema Positive for;fatigue occ.  Gastroenterology: Negative nausea;heartburn;melena;hematochezia    Genitourinary:  Negative      Musculoskeletal:  Negative      Neurologic:  Negative headaches    Psychiatric:  Negative      Heme/Lymph/Imm:  Negative allergies    Endocrine:  Negative        Physical Exam:  Vitals: /89  Pulse 80  Ht 1.816 m (5' 11.5\")  Wt 88 kg (194 lb)  BMI 26.68 kg/m2    Constitutional:  cooperative, alert and oriented, well developed, well nourished, in no acute distress        Skin:  warm and dry to the touch, no apparent skin lesions or masses noted          Head:  normocephalic, no masses or lesions        Eyes:  pupils equal and round;conjunctivae and lids unremarkable;sclera white;no xanthalasma;no " nystagmus        Lymph:      ENT:  no pallor or cyanosis, dentition good        Neck:  carotid pulses are full and equal bilaterally;no carotid bruit        Respiratory:  normal breath sounds, clear to auscultation, normal A-P diameter, normal symmetry, normal respiratory excursion, no use of accessory muscles         Cardiac: regular rhythm;normal S1 and S2;no S3 or S4;no murmurs, gallops or rubs detected                pulses full and equal                                        GI:           Extremities and Muscular Skeletal:  no edema;no spinal abnormalities noted;normal muscle strength and tone              Neurological:  no gross motor deficits        Psych:  affect appropriate, oriented to time, person and place        CC  Marshal Snyder MD  [address]  [place] [id number]

## 2018-07-10 NOTE — MR AVS SNAPSHOT
After Visit Summary   7/10/2018    Eloise Johnson    MRN: [id number]           Patient Information     Date Of Birth          1970        Visit Information        Provider Department      7/10/2018 2:15 PM Marshal Snyder MD [facility]        Today's Diagnoses     Chronic fatigue    -  1    Mixed hyperlipidemia        HDL deficiency        Cardiomyopathy of undetermined type (H)           Follow-ups after your visit        Additional Services     SLEEP EVALUATION & MANAGEMENT REFERRAL - [facility] [phone]  (Age 18 and up)       Please be aware that coverage of these services is subject to the terms and limitations of your health insurance plan.  Call member services at your health plan with any benefit or coverage questions.      Please bring the following to your appointment:    >>   List of current medications   >>   This referral request   >>   Any documents/labs given to you for this referral                Follow-Up with Cardiac Advanced Practice Provider           Follow-Up with Cardiologist       FLP/ALT                  Future tests that were ordered for you today     Open Future Orders        Priority Expected Expires Ordered    Lipid Profile Routine 7/10/2019 7/10/2019 7/10/2018    ALT Routine 7/10/2019 7/10/2019 7/10/2018    Follow-Up with Cardiac Advanced Practice Provider Routine 7/10/2019 7/11/2019 7/10/2018    Follow-Up with Cardiologist Routine 7/9/2020 7/29/2020 7/10/2018    SLEEP EVALUATION & MANAGEMENT REFERRAL - [facility] [phone]  (Age 18 and up) Routine 7/17/2018 7/10/2019 7/10/2018            Who to contact     If you have questions or need follow up information about today's clinic visit or your schedule please contact [facility]   [place] directly at [phone].  Normal or non-critical lab and imaging results will be communicated to  "you by MyChart, letter or phone within 4 business days after the clinic has received the results. If you do not hear from us within 7 days, please contact the clinic through Simbiosis or phone. If you have a critical or abnormal lab result, we will notify you by phone as soon as possible.  Submit refill requests through Simbiosis or call your pharmacy and they will forward the refill request to us. Please allow 3 business days for your refill to be completed.          Additional Information About Your Visit        O[employer] Style Information     Simbiosis gives you secure access to your electronic health record. If you see a primary care provider, you can also send messages to your care team and make appointments. If you have questions, please call your primary care clinic.  If you do not have a primary care provider, please call [phone] and they will assist you.        Care EveryWhere ID     This is your Care EveryWhere ID. This could be used by other organizations to access your [place] medical records  [id number]        Your Vitals Were     Pulse Height BMI (Body Mass Index)             80 1.816 m (5' 11.5\") 26.68 kg/m2          Blood Pressure from Last 3 Encounters:   07/10/18 142/89   07/13/17 128/82   05/01/17 124/80    Weight from Last 3 Encounters:   07/10/18 88 kg (194 lb)   07/13/17 79.1 kg (174 lb 4.8 oz)   05/01/17 77.1 kg (170 lb)              We Performed the Following     Follow-Up with Cardiac Advanced Practice Provider          Today's Medication Changes          These changes are accurate as of 7/10/18  2:59 PM.  If you have any questions, ask your nurse or doctor.               Stop taking these medicines if you haven't already. Please contact your care team if you have questions.     cycloSPORINE 0.05 % ophthalmic emulsion   Commonly known as:  RESTASIS   Stopped by:  Marshal Snyder MD                Where to get your medicines      Some of these will need a paper prescription and others can be " bought over the counter.  Ask your nurse if you have questions.     You don't need a prescription for these medications     atorvastatin 10 MG tablet                Primary Care Provider Office Phone # Fax #    Fabian Crowder [phone] [address] [facility] [address]        Equal Access to Services     LULÚ [place] : Hadii aad ku hadasho Soomaali, waaxda luqadaha, qaybta kaalmada adeegyada, waxay idiin hayaan adejamaal kheldash laabdiazizn . So [facility] [phone].    ATENCIÓN: Si habla español, tiene a laughlin disposición servicios gratuitos de asistencia lingüística. Andreas al [phone].    We comply with applicable federal civil rights laws and [place] laws. We do not discriminate on the basis of race, color, national origin, age, disability, sex, sexual orientation, or gender identity.            Thank you!     Thank you for choosing [facility]  for your care. Our goal is always to provide you with excellent care. Hearing back from our patients is one way we can continue to improve our services. Please take a few minutes to complete the written survey that you may receive in the mail after your visit with us. Thank you!             Your Updated Medication List - Protect others around you: Learn how to safely use, store and throw away your medicines at www.disposemymeds.org.          This list is accurate as of 7/10/18  2:59 PM.  Always use your most recent med list.                   Brand Name Dispense Instructions for use Diagnosis    aspirin 81 MG EC tablet      Take 1 tablet (81 mg) by mouth every other day    Hyperlipidemia LDL goal < 160       atorvastatin 10 MG tablet    LIPITOR    90 tablet    Take 1 tablet (10 mg) by mouth daily    Mixed hyperlipidemia       Multi-vitamin Tabs tablet   Generic drug:  multivitamin, therapeutic with minerals      Take 1 tablet by mouth daily.        omega-3 fatty acids 1200 MG capsule      Take 2 capsules by  mouth daily.        vitamin D 1000 units capsule      Take 1 capsule by mouth daily

## 2018-07-10 NOTE — LETTER
7/10/2018      Fabian Crowder MD  [id number] [facility] [address]      RE: Eloise He       Dear Colleague,    I had the pleasure of seeing Eloise Johnson in the [facility].    Service Date: 07/10/2018      HISTORY OF PRESENT ILLNESS:  Mr. Johnson is a very nice 48-year-old gentleman with past medical history significant for his father dying of a heart attack at age 42.  He has significant HDL deficiency and hypertriglyceridemia.  Stress test in 2009 demonstrated no evidence of ischemia.  Eloise has always been quite reluctant to go on medications.  Ultimately, I did convince him to go on baby-dose aspirin and 10 mg of atorvastatin.  He also is unwilling to do any sort of stress test that would involve radiation including a calcium scoring test.  In 2015 he was willing to undergo an MRI which demonstrated normal perfusion.  He has mildly decreased left ventricular function with ejection fraction of 48%.  Stress echo from 2015 also demonstrated decreased left ventricular function with ejection fraction of 40%-45%, consistent with a cardiomyopathy of unclear etiology.      When I saw Eloise last time, he is going through an acrimonious divorce, had lost a significant amount of weight and had the best cholesterol profile he has ever had.  He now returns having gained back over 20 pounds of weight.  He is lifting weights a couple days a week but doing no aerobic activity.  He stopped taking his atorvastatin but did stay on his aspirin.  He is having no chest, arm, neck, jaw or shoulder discomfort.  No dyspnea on exertion, orthopnea or PND.  No palpitations, lightheadedness, dizziness, syncope or near syncope.      ASSESSMENT AND PLAN:  Eloise appears to be doing okay from a cardiac standpoint without clinical evidence of ischemia, heart failure or significant arrhythmia.      Unfortunately, he did gain the weight back up to 194 pounds, giving him a body mass index of 26.7.  This reflects in  deterioration in both his blood pressure and his cholesterol profile.      Blood pressure is 142/89 with a pulse of 80.  He is not at all interested in going on any medications.  I have emphasized the importance of aerobic activity, regular exercise, a diet high in fresh fruits and vegetables and low in sodium.      Fasting lipid profile:  Cholesterol is up to 171, HDL is down to 33, LDL is up to 80 and triglycerides are 290.  He has already restarted his atorvastatin this morning, 10 mg daily.  I have told him we will recheck a fasting lipid profile in 1 month.  I may consider changing to rosuvastatin, depending on his HDL and triglyceride response.  I did point out when he was 20 pounds lighter, his cholesterol profile was much improved.  Granted, he was also on atorvastatin at that time.      I do not think we need any sort of stress testing as he is asymptomatic.      I will plan on having him return in 1 year.  If he should have any problems, I would be glad to see him sooner.      Thank you for allowing me to participate in his care.      Marshal Smith MD, FACC         MARSHAL SMITH MD, [facility]             D: 07/10/2018   T: 2018   MT: JAIME      Name:     BRIGHT DONIS   MRN:      [id number]        Account:      [id number]   :      1970           Service Date: 07/10/2018      Document: [id number]         Outpatient Encounter Prescriptions as of 7/10/2018   Medication Sig Dispense Refill     aspirin EC 81 MG tablet Take 1 tablet (81 mg) by mouth every other day       atorvastatin (LIPITOR) 10 MG tablet Take 1 tablet (10 mg) by mouth daily 90 tablet 3     Multiple Vitamin (MULTI-VITAMIN) per tablet Take 1 tablet by mouth daily.       omega-3 fatty acids 1200 MG capsule Take 2 capsules by mouth daily.       Cholecalciferol (VITAMIN D) 1000 UNITS capsule Take 1 capsule by mouth daily       [DISCONTINUED] atorvastatin (LIPITOR) 10 MG tablet Take 1 tablet (10 mg) by mouth daily (Patient not taking:  Reported on 7/10/2018) 90 tablet 3     [DISCONTINUED] cycloSPORINE (RESTASIS) 0.05 % ophthalmic emulsion Place 1 drop into both eyes 2 times daily (Patient not taking: Reported on 7/10/2018) 2 Box 11     No facility-administered encounter medications on file as of 7/10/2018.        Again, thank you for allowing me to participate in the care of your patient.      Sincerely,    Marshal Snyder MD     [facility]

## 2018-07-11 NOTE — PROGRESS NOTES
Service Date: 07/10/2018      HISTORY OF PRESENT ILLNESS:  Mr. Johnson is a very nice 48-year-old gentleman with past medical history significant for his father dying of a heart attack at age 42.  He has significant HDL deficiency and hypertriglyceridemia.  Stress test in 2009 demonstrated no evidence of ischemia.  Eloise has always been quite reluctant to go on medications.  Ultimately, I did convince him to go on baby-dose aspirin and 10 mg of atorvastatin.  He also is unwilling to do any sort of stress test that would involve radiation including a calcium scoring test.  In 2015 he was willing to undergo an MRI which demonstrated normal perfusion.  He has mildly decreased left ventricular function with ejection fraction of 48%.  Stress echo from 2015 also demonstrated decreased left ventricular function with ejection fraction of 40%-45%, consistent with a cardiomyopathy of unclear etiology.      When I saw Eloise last time, he is going through an acrimonious divorce, had lost a significant amount of weight and had the best cholesterol profile he has ever had.  He now returns having gained back over 20 pounds of weight.  He is lifting weights a couple days a week but doing no aerobic activity.  He stopped taking his atorvastatin but did stay on his aspirin.  He is having no chest, arm, neck, jaw or shoulder discomfort.  No dyspnea on exertion, orthopnea or PND.  No palpitations, lightheadedness, dizziness, syncope or near syncope.      ASSESSMENT AND PLAN:  Eloise appears to be doing okay from a cardiac standpoint without clinical evidence of ischemia, heart failure or significant arrhythmia.      Unfortunately, he did gain the weight back up to 194 pounds, giving him a body mass index of 26.7.  This reflects in deterioration in both his blood pressure and his cholesterol profile.      Blood pressure is 142/89 with a pulse of 80.  He is not at all interested in going on any medications.  I have emphasized the importance of  aerobic activity, regular exercise, a diet high in fresh fruits and vegetables and low in sodium.      Fasting lipid profile:  Cholesterol is up to 171, HDL is down to 33, LDL is up to 80 and triglycerides are 290.  He has already restarted his atorvastatin this morning, 10 mg daily.  I have told him we will recheck a fasting lipid profile in 1 month.  I may consider changing to rosuvastatin, depending on his HDL and triglyceride response.  I did point out when he was 20 pounds lighter, his cholesterol profile was much improved.  Granted, he was also on atorvastatin at that time.      I do not think we need any sort of stress testing as he is asymptomatic.      I will plan on having him return in 1 year.  If he should have any problems, I would be glad to see him sooner.      Thank you for allowing me to participate in his care.      Marshal Smith MD, FACC         MARSHAL SMITH MD, FACC             D: 07/10/2018   T: 2018   MT: JAIME      Name:     BRIGHT DONIS   MRN:      [id number]        Account:      [id number]   :      1970           Service Date: 07/10/2018      Document: [id number]

## 2018-07-18 ENCOUNTER — TRANSFERRED RECORDS (OUTPATIENT)
Dept: HEALTH INFORMATION MANAGEMENT | Facility: CLINIC | Age: 48
End: 2018-07-18

## 2018-08-02 ENCOUNTER — TELEPHONE (OUTPATIENT)
Dept: CARDIOLOGY | Facility: CLINIC | Age: 48
End: 2018-08-02

## 2018-08-02 DIAGNOSIS — E78.00 PURE HYPERCHOLESTEROLEMIA: Primary | ICD-10-CM

## 2018-08-02 NOTE — TELEPHONE ENCOUNTER
Message from patient wondering if he can choose a different location for the study and asks about timing.    Spoke with patient, he is scheduling his sleep study at [facility]. This will probably be completed in October. He believes this is a [place] site.  Patient asked about f/u lipid panel mentioned at his OV. Per Dr. Snyder, repeat lipids are due in 30 days, he would like the order to be put in for early September and he will schedule that himself.

## 2018-08-14 ENCOUNTER — TRANSFERRED RECORDS (OUTPATIENT)
Dept: HEALTH INFORMATION MANAGEMENT | Facility: CLINIC | Age: 48
End: 2018-08-14

## 2018-08-22 PROBLEM — F41.9 ANXIETY: Status: RESOLVED | Noted: 2017-05-01 | Resolved: 2018-08-22

## 2018-08-22 PROBLEM — F51.04 PSYCHOPHYSIOLOGICAL INSOMNIA: Status: RESOLVED | Noted: 2017-05-01 | Resolved: 2018-08-22

## 2018-08-23 ENCOUNTER — OFFICE VISIT (OUTPATIENT)
Dept: SLEEP MEDICINE | Facility: CLINIC | Age: 48
End: 2018-08-23
Payer: COMMERCIAL

## 2018-08-23 VITALS
DIASTOLIC BLOOD PRESSURE: 102 MMHG | SYSTOLIC BLOOD PRESSURE: 147 MMHG | HEART RATE: 77 BPM | BODY MASS INDEX: 29.33 KG/M2 | WEIGHT: 198 LBS | OXYGEN SATURATION: 94 % | HEIGHT: 69 IN

## 2018-08-23 DIAGNOSIS — R06.00 DYSPNEA AND RESPIRATORY ABNORMALITY: ICD-10-CM

## 2018-08-23 DIAGNOSIS — R53.82 CHRONIC FATIGUE: Primary | ICD-10-CM

## 2018-08-23 DIAGNOSIS — R06.89 DYSPNEA AND RESPIRATORY ABNORMALITY: ICD-10-CM

## 2018-08-23 DIAGNOSIS — I42.9 CARDIOMYOPATHY OF UNDETERMINED TYPE (H): Chronic | ICD-10-CM

## 2018-08-23 PROCEDURE — 99244 OFF/OP CNSLTJ NEW/EST MOD 40: CPT | Performed by: INTERNAL MEDICINE

## 2018-08-23 RX ORDER — ZOLPIDEM TARTRATE 5 MG/1
TABLET ORAL
Qty: 1 TABLET | Refills: 0 | Status: SHIPPED | OUTPATIENT
Start: 2018-08-23 | End: 2018-09-26

## 2018-08-23 NOTE — MR AVS SNAPSHOT
After Visit Summary   8/23/2018    Eloise Johnson    MRN: [id number]           Patient Information     Date Of Birth          1970        Visit Information        Provider Department      8/23/2018 9:00 AM Seamus Reagan MD [facility]        Today's Diagnoses     Chronic fatigue    -  1    Cardiomyopathy of undetermined type (H)        Dyspnea and respiratory abnormality           Follow-ups after your visit        Follow-up notes from your care team     Return in about 2 weeks (around 9/6/2018) for results.      Your next 10 appointments already scheduled     Sep 04, 2018  8:00 PM CDT   PSG Split with BK BED 1   [facility] ([facility])    [address]  [place] [id number] [phone]            Sep 18, 2018  3:20 PM CDT   Return Sleep Patient with Seamus Reagan MD   [facility] ([facility])    [address]  [place] [id number] [phone]              Future tests that were ordered for you today     Open Future Orders        Priority Expected Expires Ordered    Comprehensive Sleep Study Routine  2/19/2019 8/23/2018            Who to contact     If you have questions or need follow up information about today's clinic visit or your schedule please contact [facility] directly at [phone].  Normal or non-critical lab and imaging results will be communicated to you by MyChart, letter or phone within 4 business days after the clinic has received the results. If you do not hear from us within 7 days, please contact the clinic through MyChart or phone. If you have a critical or abnormal lab result, we will notify you by phone as soon as possible.  Submit refill requests through [employer] or call your pharmacy and they will forward the refill request to us. Please allow 3 business days for your refill to be completed.           "Additional Information About Your Visit        [employer] Information     [employer]: online travellers club gives you secure access to your electronic health record. If you see a primary care provider, you can also send messages to your care team and make appointments. If you have questions, please call your primary care clinic.  If you do not have a primary care provider, please call [phone] and they will assist you.        Care EveryWhere ID     This is your Care EveryWhere ID. This could be used by other organizations to access your [facility] medical records  [id number]        Your Vitals Were     Pulse Height Pulse Oximetry BMI (Body Mass Index)          77 1.753 m (5' 9\") 94% 29.24 kg/m2         Blood Pressure from Last 3 Encounters:   08/23/18 (!) 147/102   07/10/18 142/89   07/13/17 128/82    Weight from Last 3 Encounters:   08/23/18 89.8 kg (198 lb)   07/10/18 88 kg (194 lb)   07/13/17 79.1 kg (174 lb 4.8 oz)              We Performed the Following     SLEEP EVALUATION & MANAGEMENT REFERRAL - ADULT [facility] [phone]  (Age 18 and up)          Today's Medication Changes          These changes are accurate as of 8/23/18  9:52 AM.  If you have any questions, ask your nurse or doctor.               Start taking these medicines.        Dose/Directions    zolpidem 5 MG tablet   Commonly known as:  AMBIEN   Used for:  Chronic fatigue, Cardiomyopathy of undetermined type (H), Dyspnea and respiratory abnormality   Started by:  Seamus Reagan MD        Take tablet by mouth 15 minutes prior to sleep, for Sleep Study   Quantity:  1 tablet   Refills:  0            Where to get your medicines      Some of these will need a paper prescription and others can be bought over the counter.  Ask your nurse if you have questions.     Bring a paper prescription for each of these medications     zolpidem 5 MG tablet                Primary Care Provider Office Phone # Fax #    Fabian Crowder [phone] "       [address] [facility] [address]        Equal Access to Services     JESSICAKALI LIZ : Hadii shravan ku hadheike Sobuddyali, waaxda luqadaha, qaybta kaalmada nettiejessicaelissa, raphael izquierdo katyyohan burgoseldawilfred whitt. So [facility] [phone].    ATENCIÓN: Si habla español, tiene a laughlin disposición servicios gratuitos de asistencia lingüística. [facility] [phone].    We comply with applicable federal civil rights laws and [place] laws. We do not discriminate on the basis of race, color, national origin, age, disability, sex, sexual orientation, or gender identity.            Thank you!     Thank you for choosing [facility]  for your care. Our goal is always to provide you with excellent care. Hearing back from our patients is one way we can continue to improve our services. Please take a few minutes to complete the written survey that you may receive in the mail after your visit with us. Thank you!             Your Updated Medication List - Protect others around you: Learn how to safely use, store and throw away your medicines at www.disposemymeds.org.          This list is accurate as of 8/23/18  9:52 AM.  Always use your most recent med list.                   Brand Name Dispense Instructions for use Diagnosis    aspirin 81 MG EC tablet      Take 1 tablet (81 mg) by mouth every other day    Hyperlipidemia LDL goal < 160       atorvastatin 10 MG tablet    LIPITOR    90 tablet    Take 1 tablet (10 mg) by mouth daily    Mixed hyperlipidemia       Multi-vitamin Tabs tablet   Generic drug:  multivitamin, therapeutic with minerals      Take 1 tablet by mouth daily.        omega-3 fatty acids 1200 MG capsule      Take 2 capsules by mouth daily.        vitamin D 1000 units capsule      Take 1 capsule by mouth daily        zolpidem 5 MG tablet    AMBIEN    1 tablet    Take tablet by mouth 15 minutes prior to sleep, for Sleep Study    Chronic fatigue, Cardiomyopathy of undetermined type (H), Dyspnea and  respiratory abnormality

## 2018-08-23 NOTE — NURSING NOTE
"Chief Complaint   Patient presents with     Sleep Problem     consult-Preventative cardiologist recommended. Snore while sleep and feel tired after waking up       Initial BP (!) 147/102  Pulse 77  Ht 1.753 m (5' 9\")  Wt 89.8 kg (198 lb)  SpO2 94%  BMI 29.24 kg/m2 Estimated body mass index is 29.24 kg/(m^2) as calculated from the following:    Height as of this encounter: 1.753 m (5' 9\").    Weight as of this encounter: 89.8 kg (198 lb).    Medication Reconciliation: complete    Neck circumference: 15 inches / 38 centimeters.      "

## 2018-08-23 NOTE — PROGRESS NOTES
Sleep Consultation:    Date on this visit: 8/23/2018    Eloise He is sent by Marshal Snyder for a sleep consultation regarding fatigue.    Primary Physician: Fabian Crowder     Chief Complaint   Patient presents with     Sleep Problem     consult-Preventative cardiologist recommended. Snore while sleep and feel tired after waking up        Eloise goes to bed at 1130-12 PM during the week. He gets up at 7-730 AM with an alarm. He falls asleep in 10 minutes.  Biao denies difficulty falling asleep.  He wakes up 0-1 times a night for a few minutes before falling back to sleep. Biao wakes up to go to the bathroom, uncertain reasons and external stimuli.  On weekends, schedule is similar.  Patient gets an average of 7-8 hours of sleep per night.     Patient does use electronics in bed in the morning.     Biao does snore. Patient does not have a regular bed partner. He does not have witnessed apneas. Patient sleeps on his back and side. He has occasional morning headaches (1-2/month), denies no restless legs.     Biao denies any sleep talking, dream enactment and sleep paralysis.    Biao denies reflux at night.      Patient describes themself as a night person. Patient's [place] Sleepiness score 12/24. He feels unrefreshed in the morning.     Biao naps rarely. He takes brief inadvertant naps.  He admits dozing while driving if he has not had enough sleep. He uses 3-4 cups/week of coffee.     CBC RESULTS:   Recent Labs   Lab Test 01/12/15   WBC  5.1   RBC  4.83   HGB  14.3   HCT  42   MCV  88   MCH  30   MCHC  34   RDW  13   PLT  170     Recent Labs   Lab Test  07/06/18   0821  01/12/17   0807   NA  143  142   POTASSIUM  3.9  3.7   CHLORIDE  107  105   CO2  29  30   ANIONGAP  7  7   GLC  97  103*   BUN  13  13   CR  0.81  0.87   EVARISTO  8.9  9.2     Lab Results   Component Value Date    TSH 2.00 01/12/2015       Allergies:    No Known Allergies    Medications:    Current Outpatient Prescriptions   Medication Sig  Dispense Refill     aspirin EC 81 MG tablet Take 1 tablet (81 mg) by mouth every other day       atorvastatin (LIPITOR) 10 MG tablet Take 1 tablet (10 mg) by mouth daily 90 tablet 3     Multiple Vitamin (MULTI-VITAMIN) per tablet Take 1 tablet by mouth daily.       omega-3 fatty acids 1200 MG capsule Take 2 capsules by mouth daily.       zolpidem (AMBIEN) 5 MG tablet Take tablet by mouth 15 minutes prior to sleep, for Sleep Study 1 tablet 0     Cholecalciferol (VITAMIN D) 1000 UNITS capsule Take 1 capsule by mouth daily         Problem List:  Patient Active Problem List    Diagnosis Date Noted     Cardiomyopathy of undetermined type (H)      Priority: Medium     Idiopathic nonischemic cardiomyopathy. Stress echocardiography 2015 showed an EF of 40%-45% at baseline, which improved with stress to 55%-60%. Stress MRI/MRA 2015 with normal perfusion at rest and post-stress without evidence of inducible ischemia. Normal left ventricular size and mildly reduced systolic functionwith a calculated ejection fraction of  48 %.  Normal right ventricular size and systolic function with a calculated ejection fraction of 50%. On delayed enhancement imaging, there is no abnormal hyperenhancement to suggest myocardial scar/inflammation/infiltration. Collectively, these findings are consistent with an idiopathic nonischemic cardiomyopathy.       HDL deficiency 04/28/2015     Priority: Medium     Mixed hyperlipidemia      Priority: Medium     Family history of early CAD      Priority: Low     High myopia, both eyes 02/04/2014     Priority: Low     Chronic fatigue 10/09/2012     Priority: Low     Cough 10/09/2012     Priority: Low        Past Medical/Surgical History:  Past Medical History:   Diagnosis Date     Anxiety 05/01/2017    resolved 2018     Psychophysiological insomnia 05/01/2017    resolved 2018     Past Surgical History:   Procedure Laterality Date     COLONOSCOPY WITH CO2 INSUFFLATION N/A 10/24/2014    Procedure:  COLONOSCOPY WITH CO2 INSUFFLATION;  Surgeon: Duane, William Charles, MD;  Location: [facility] OR     HC TOOTH EXTRACTION W/FORCEP         Social History:  Social History     Social History     Marital status:      Spouse name: N/A     Number of children: N/A     Years of education: N/A     Occupational History     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[employer], teachers/students     Social History Main Topics     Smoking status: Former Smoker     Packs/day: 1.00     Years: 2.00     Types: Cigarettes     Smokeless tobacco: Never Used     Alcohol use 0.5 - 1.0 oz/week     1 - 2 Standard drinks or equivalent per week      Comment: socially     Drug use: No     Sexual activity: Not Currently      Comment: not currently      Other Topics Concern     Caffeine Concern Yes     tea 1-2 per day     Sleep Concern No     Stress Concern No     Weight Concern No     Special Diet No     Exercise No     Seat Belt Yes     Social History Narrative       Family History:  Family History   Problem Relation Age of Onset     C.A.D. Father      Coronary Artery Disease Father      Diabetes Maternal Grandmother      Colon Cancer No family hx of        Review of Systems:  A complete review of systems reviewed by me is negative with the exeption of what has been mentioned in the history of present illness.  CONSTITUTIONAL:  POSITIVE for  weight gain  EYES:  POSITIVE for changes in vision  ENT: NEGATIVE for ear pain, sore throat, sinus pain, post-nasal drip, runny nose, bloody nose  CARDIAC: NEGATIVE for fast heartbeats or fluttering in chest, chest pain or pressure, breathlessness when lying flat, swollen legs or swollen feet.  NEUROLOGIC: NEGATIVE headaches, weakness or numbness in the arms or legs.  DERMATOLOGIC:  POSITIVE for  rashes  PULMONARY: NEGATIVE SOB at rest, SOB with activity, dry cough, productive cough, coughing up blood, wheezing or whistling when breathing.    GASTROINTESTINAL: NEGATIVE for nausea or vomitting, loose or watery  "stools, fat or grease in stools, constipation, abdominal pain, bowel movements black in color or blood noted.  GENITOURINARY: NEGATIVE for pain during urination, blood in urine, urinating more frequently than usual, irregular menstrual periods.  MUSCULOSKELETAL:  POSITIVE for  muscle pain and bone or joint pain  ENDOCRINE: NEGATIVE for increased thirst or urination, diabetes.  LYMPHATIC: NEGATIVE for swollen lymph nodes, lumps or bumps in the breasts or nipple discharge.    Physical Examination:  Vitals: BP (!) 147/102  Pulse 77  Ht 1.753 m (5' 9\")  Wt 89.8 kg (198 lb)  SpO2 94%  BMI 29.24 kg/m2  BMI= Body mass index is 29.24 kg/(m^2).    Neck Cir (cm): 38 cm    [place] Total Score 8/23/2018   Total score - [place] 12     GENERAL APPEARANCE: healthy, alert and no distress  EYES: Eyes grossly normal to inspection and conjunctivae and sclerae normal  HENT: ear canals and TM's normal and nose and mouth without ulcers or lesions  NECK: no adenopathy, no asymmetry, masses, or scars and thyroid normal to palpation  RESP: lungs clear to auscultation - no rales, rhonchi or wheezes  CV: regular rates and rhythm, normal S1 S2, no S3 or S4 and no murmur, click or rub  ABDOMEN: soft, nontender, without hepatosplenomegaly or masses and bowel sounds normal  MS: extremities normal- no gross deformities noted  NEURO: Normal strength and tone, mentation intact, speech normal and cranial nerves 2-12 intact  PSYCH: mentation appears normal and affect normal/bright  Mallampati Class: II.  Tonsillar Stage: 1  hidden by pillars.    Impression/Plan:    Unrefreshing sleep (ESS 12), loud snoring, adisan ancestry, comorbid carduiomyopathy.   Options discussed Elect Polysomnogram (using 4% desaturation/Medicare/2012 AASM 1B scoring rules) for moderate probability obstructive sleep apnea. Ambien if needed. Patient is a poor candidate for Home Sleep Testing due to not high probability of severe HUEY, possible central sleep apnea and history " of insomnia.    Patient to follow up with Primary Care provider regarding elevated blood pressure.     Literature provided regarding sleep apnea.      He will follow up with me in approximately two weeks after his sleep study has been competed to review the results and discuss plan of care.       Polysomnography reviewed.  Obstructive sleep apnea reviewed.  Complications of untreated sleep apnea were reviewed.    Seamus Reagan     CC: Marshal Snyder; Fabian Crowder

## 2018-09-04 ENCOUNTER — THERAPY VISIT (OUTPATIENT)
Dept: SLEEP MEDICINE | Facility: CLINIC | Age: 48
End: 2018-09-04
Payer: COMMERCIAL

## 2018-09-04 DIAGNOSIS — I42.9 CARDIOMYOPATHY OF UNDETERMINED TYPE (H): Chronic | ICD-10-CM

## 2018-09-04 DIAGNOSIS — R53.82 CHRONIC FATIGUE: ICD-10-CM

## 2018-09-04 DIAGNOSIS — R06.00 DYSPNEA AND RESPIRATORY ABNORMALITY: ICD-10-CM

## 2018-09-04 DIAGNOSIS — R06.89 DYSPNEA AND RESPIRATORY ABNORMALITY: ICD-10-CM

## 2018-09-04 PROCEDURE — 95810 POLYSOM 6/> YRS 4/> PARAM: CPT | Performed by: INTERNAL MEDICINE

## 2018-09-04 NOTE — MR AVS SNAPSHOT
After Visit Summary   9/4/2018    Eloise Johnson    MRN: [id number]           Patient Information     Date Of Birth          1970        Visit Information        Provider Department      9/4/2018 8:00 PM BK BED 1 [facility]        Today's Diagnoses     Chronic fatigue        Cardiomyopathy of undetermined type (H)        Dyspnea and respiratory abnormality           Follow-ups after your visit        Your next 10 appointments already scheduled     Sep 18, 2018  3:20 PM CDT   Return Sleep Patient with Seamus Reagan MD   [facility] ([facility])    [address] [address]   [phone]            Oct 02, 2018 10:00 AM CDT   (Arrive by 9:45 AM)   NEW KNEE with Eladia Linares MD   [facility] ([facility] [place])    [address] [address] [phone]              Who to contact     If you have questions or need follow up information about today's clinic visit or your schedule please contact [facility] directly at [phone].  Normal or non-critical lab and imaging results will be communicated to you by MyChart, letter or phone within 4 business days after the clinic has received the results. If you do not hear from us within 7 days, please contact the clinic through [employer] or phone. If you have a critical or abnormal lab result, we will notify you by phone as soon as possible.  Submit refill requests through [email or web address] or call your pharmacy and they will forward the refill request to us. Please allow 3 business days for your refill to be completed.          Additional Information About Your Visit        MyChart Information     [email or web address] gives you secure access to your electronic health record. If you see a primary care provider, you can also send messages to your care team and make appointments. If you have questions, please  call your primary care clinic.  If you do not have a primary care provider, please call [phone] and they will assist you.        Care EveryWhere ID     This is your Care EveryWhere ID. This could be used by other organizations to access your [place] medical records  [id number]         Blood Pressure from Last 3 Encounters:   08/23/18 (!) 147/102   07/10/18 142/89   07/13/17 128/82    Weight from Last 3 Encounters:   08/23/18 89.8 kg (198 lb)   07/10/18 88 kg (194 lb)   07/13/17 79.1 kg (174 lb 4.8 oz)              We Performed the Following     Comprehensive Sleep Study        Primary Care Provider Office Phone # Fax #    Fabian Crowder [phone] [address] [facility] [address]        Equal Access to Services     [facility]: Hadii aad ku hadasho Soomaali, waaxda luqadaha, qaybta kaalmada adeegyada, raphael olivaresin haydaly hagen . So [facility] [phone].    ATENCIÓN: Si habla español, tiene a laughlin disposición servicios gratuitos de asistencia lingüística. Llame al [phone].    We comply with applicable federal civil rights laws and [place] laws. We do not discriminate on the basis of race, color, national origin, age, disability, sex, sexual orientation, or gender identity.            Thank you!     Thank you for choosing [facility]  for your care. Our goal is always to provide you with excellent care. Hearing back from our patients is one way we can continue to improve our services. Please take a few minutes to complete the written survey that you may receive in the mail after your visit with us. Thank you!             Your Updated Medication List - Protect others around you: Learn how to safely use, store and throw away your medicines at www.disposemymeds.org.          This list is accurate as of 9/4/18 11:59 PM.  Always use your most recent med list.                   Brand Name Dispense Instructions for use Diagnosis    aspirin 81 MG EC  tablet      Take 1 tablet (81 mg) by mouth every other day    Hyperlipidemia LDL goal < 160       atorvastatin 10 MG tablet    LIPITOR    90 tablet    Take 1 tablet (10 mg) by mouth daily    Mixed hyperlipidemia       Multi-vitamin Tabs tablet   Generic drug:  multivitamin, therapeutic with minerals      Take 1 tablet by mouth daily.        omega-3 fatty acids 1200 MG capsule      Take 2 capsules by mouth daily.        vitamin D 1000 units capsule      Take 1 capsule by mouth daily        zolpidem 5 MG tablet    AMBIEN    1 tablet    Take tablet by mouth 15 minutes prior to sleep, for Sleep Study    Chronic fatigue, Cardiomyopathy of undetermined type (H), Dyspnea and respiratory abnormality

## 2018-09-06 PROBLEM — G47.33 OSA (OBSTRUCTIVE SLEEP APNEA): Chronic | Status: ACTIVE | Noted: 2018-09-06

## 2018-09-06 NOTE — PROCEDURES
" SLEEP STUDY INTERPRETATION  DIAGNOSTIC POLYSOMNOGRAPHY REPORT      Patient: BRIGHT DONIS  YOB: 1970  Study Date: 9/4/2018  MRN: [id number]  Referring Provider: Marshal Snyder  Ordering Provider: Seamus Reagan MD    Indications for Polysomnography: The patient is a 48 y old Male who is 5' 9\" and weighs 198.0 lbs. His BMI is 29.3, [place] sleepiness scale 12.0 and neck circumference is 38.0 cm. A diagnostic polysomnogram was performed to evaluate for unrefreshing sleep, loud snoring,  ancestry, comorbid cardiomyopathy.      Polysomnogram Data: A full night polysomnogram recorded the standard physiologic parameters including EEG, EOG, EMG, ECG, nasal and oral airflow. Respiratory parameters of chest and abdominal movements were recorded with respiratory inductance plethysmography. Oxygen saturation was recorded by pulse oximetry. Hypopnea scoring rule used: 1B 4%.      Sleep Architecture:   The total recording time of the polysomnogram was 413.7 minutes. The total sleep time was 324.5 minutes. Sleep latency was decreased at 7.9 minutes without the use of a sleep aid. REM latency was 97.0 minutes. Arousal index was 39.4 arousals per hour. Sleep efficiency was decreased at 78.4%. Wake after sleep onset was 66.0 minutes. The patient spent 14.5% of total sleep time in Stage N1, 51.0% in Stage N2, 11.6% in Stage N3, and 23.0% in REM. Time in REM supine was 59.5 minutes.      Respiration:     Events ? The polysomnogram revealed a presence of 64 obstructive, 3 central, and 11 mixed apneas resulting in an apnea index of 14.4 events per hour. There were 39 obstructive hypopneas and 0 central hypopneas resulting in an obstructive hypopnea index of 7.2 and central hypopnea index of - events per hour. The combined apnea/hypopnea index was 21.6 events per hour (central apnea/hypopnea index was 0.6 events per hour). The REM AHI was 45.1 events per hour. The supine AHI was 38.1 events per hour. The RERA index was " 12.0 events per hour.  The RDI was 33.7 events per hour.    Snoring - was reported as moderate to very loud.    Respiratory rate and pattern - was notable for normal respiratory rate and pattern.    Sustained Sleep Associated Hypoventilation - Transcutaneous carbon dioxide monitoring was not used, however significant hypoventilation was not suggested by oximetry    Sleep Associated Hypoxemia - (Greater than 5 minutes O2 sat at or below 88%) was present. Baseline oxygen saturation was 95.5%. Lowest oxygen saturation was 69.5%. Time spent less than or equal to 88% was 14.5 minutes. Time spent less than or equal to 89% was 17.3 minutes.    Movement Activity:     Periodic Limb Activity - There were 94 PLMs during the entire study. The PLM index was 17.4 movements per hour. The PLM Arousal Index was 1.5 per hour.    REM EMG Activity - Excessive transient/sustained muscle activity was not present.    Nocturnal Behavior - Abnormal sleep related behaviors were not noted     Bruxism - None apparent.      Cardiac Summary:   The average pulse rate was 67.5 bpm. The minimum pulse rate was 54.0 bpm while the maximum pulse rate was 97.0 bpm.  Arrhythmias were not noted.      Assessment:     Moderate obstructive sleep apnea, severe by RDI    Sleep related hypoxemia    Periodic limb movements of sleep    Recommendations:    Consider repeat polysomnography with full night titration of positive airway pressure therapy for the control of sleep disordered breathing.    Based on the presence of mild/moderate obstructive sleep apnea and excessive daytime sleepiness, treatment could be empirically initiated with Auto?titrating PAP therapy with a range of 5 to 18 cmH2O. Recommend clinical follow up with sleep management team.    Patient may be a candidate for dental appliance through referral to Sleep Dentistry for the treatment of obstructive sleep apnea and/or socially disruptive snoring.    If devices are not acceptable or effective,  patient may benefit from evaluation of possible surgical options. If he is interested, would recommend referral to specialized ENT-Sleep provider.    Advice regarding the risks of drowsy driving.    Suggest optimizing sleep schedule and avoiding sleep deprivation.    Weight management (if BMI > 30).    Pharmacologic therapy should be used for management of restless legs syndrome only if present and clinically indicated and not based on the presence of periodic limb movements alone.    Diagnostic Codes:   Obstructive Sleep Apnea G47.33  Sleep Hypoxemia/Hypoventilation G47.36        _____________________________________   Electronically Signed By: Seamus Reagan MD 9/6/18           Range(%) Time in range (min)   0.0 - 89.0 17.3   0.0 - 88.0 14.5         Stage Min(mm Hg) Max(mm Hg)   Wake - -   NREM(1+2+3) - -   REM - -       Range(mmHg) Time in range (min)   55.0 - 100.0 -   Excluded data <20.0 & >65.0 414.0

## 2018-09-07 LAB — SLPCOMP: NORMAL

## 2018-09-26 ENCOUNTER — OFFICE VISIT (OUTPATIENT)
Dept: SLEEP MEDICINE | Facility: CLINIC | Age: 48
End: 2018-09-26
Payer: COMMERCIAL

## 2018-09-26 VITALS
SYSTOLIC BLOOD PRESSURE: 134 MMHG | DIASTOLIC BLOOD PRESSURE: 93 MMHG | HEART RATE: 84 BPM | WEIGHT: 196 LBS | BODY MASS INDEX: 29.03 KG/M2 | OXYGEN SATURATION: 96 % | HEIGHT: 69 IN

## 2018-09-26 DIAGNOSIS — G47.33 OSA (OBSTRUCTIVE SLEEP APNEA): ICD-10-CM

## 2018-09-26 PROCEDURE — 99214 OFFICE O/P EST MOD 30 MIN: CPT | Performed by: INTERNAL MEDICINE

## 2018-09-26 NOTE — PROGRESS NOTES
"  Sleep Study Follow-Up Visit:    Date on this visit: 9/26/2018    Eloise He comes in today for follow-up of his sleep study done at the [facility] for unrefreshing sleep (ESS 12), loud snoring,  ancestry, comorbid carduiomyopathy.       Study Date: 9/4/2018  MRN: [id number]  Referring Provider: Marshal Snyder  Ordering Provider: Seamus Reagan MD     Indications for Polysomnography: The patient is a 48 y old Male who is 5' 9\" and weighs 198.0 lbs. His BMI is 29.3, [place] sleepiness scale 12.0 and neck circumference is 38.0 cm. A diagnostic polysomnogram was performed to evaluate for unrefreshing sleep, loud snoring,  ancestry, comorbid cardiomyopathy.        Polysomnogram Data: A full night polysomnogram recorded the standard physiologic parameters including EEG, EOG, EMG, ECG, nasal and oral airflow. Respiratory parameters of chest and abdominal movements were recorded with respiratory inductance plethysmography. Oxygen saturation was recorded by pulse oximetry. Hypopnea scoring rule used: 1B 4%.        Sleep Architecture:   The total recording time of the polysomnogram was 413.7 minutes. The total sleep time was 324.5 minutes. Sleep latency was decreased at 7.9 minutes without the use of a sleep aid. REM latency was 97.0 minutes. Arousal index was 39.4 arousals per hour. Sleep efficiency was decreased at 78.4%. Wake after sleep onset was 66.0 minutes. The patient spent 14.5% of total sleep time in Stage N1, 51.0% in Stage N2, 11.6% in Stage N3, and 23.0% in REM. Time in REM supine was 59.5 minutes.        Respiration:     Events ? The polysomnogram revealed a presence of 64 obstructive, 3 central, and 11 mixed apneas resulting in an apnea index of 14.4 events per hour. There were 39 obstructive hypopneas and 0 central hypopneas resulting in an obstructive hypopnea index of 7.2 and central hypopnea index of - events per hour. The combined apnea/hypopnea index was 21.6 events per " hour (central apnea/hypopnea index was 0.6 events per hour). The REM AHI was 45.1 events per hour. The supine AHI was 38.1 events per hour. The RERA index was 12.0 events per hour.  The RDI was 33.7 events per hour.    Snoring - was reported as moderate to very loud.    Respiratory rate and pattern - was notable for normal respiratory rate and pattern.    Sustained Sleep Associated Hypoventilation - Transcutaneous carbon dioxide monitoring was not used, however significant hypoventilation was not suggested by oximetry    Sleep Associated Hypoxemia - (Greater than 5 minutes O2 sat at or below 88%) was present. Baseline oxygen saturation was 95.5%. Lowest oxygen saturation was 69.5%. Time spent less than or equal to 88% was 14.5 minutes. Time spent less than or equal to 89% was 17.3 minutes.     Movement Activity:     Periodic Limb Activity - There were 94 PLMs during the entire study. The PLM index was 17.4 movements per hour. The PLM Arousal Index was 1.5 per hour.    REM EMG Activity - Excessive transient/sustained muscle activity was not present.    Nocturnal Behavior - Abnormal sleep related behaviors were not noted     Bruxism - None apparent.        Cardiac Summary:   The average pulse rate was 67.5 bpm. The minimum pulse rate was 54.0 bpm while the maximum pulse rate was 97.0 bpm.  Arrhythmias were not noted.      Patient not checking BP on own.  In clinic  BP Readings from Last 6 Encounters:   09/26/18 (!) 134/93   08/23/18 (!) 147/102   07/10/18 142/89   07/13/17 128/82   05/01/17 124/80   02/01/17 125/87     Recent Labs   Lab Test  07/06/18   0821  01/12/17   0807   NA  143  142   POTASSIUM  3.9  3.7   CHLORIDE  107  105   CO2  29  30   ANIONGAP  7  7   GLC  97  103*   BUN  13  13   CR  0.81  0.87   EVARISTO  8.9  9.2     These findings were reviewed with patient.     Past medical/surgical history, family history, social history, medications and allergies were reviewed.      Problem List:  Patient Active Problem  List    Diagnosis Date Noted     HUEY (obstructive sleep apnea)- moderate (AHI 21) 09/06/2018     Priority: Medium     9/4/2018 [facility] Study (198.0 lbs) - AHI 21.6, RDI 33.7, Supine AHI 38.1, REM AHI 45.1, Low O2 69.5%, Time Spent ?88% 14.5 minutes / Time Spent ?89% 17.3 minutes.       Cardiomyopathy of undetermined type (H)      Priority: Medium     Idiopathic nonischemic cardiomyopathy. Stress echocardiography 2015 showed an EF of 40%-45% at baseline, which improved with stress to 55%-60%. Stress MRI/MRA 2015 with normal perfusion at rest and post-stress without evidence of inducible ischemia. Normal left ventricular size and mildly reduced systolic functionwith a calculated ejection fraction of  48 %.  Normal right ventricular size and systolic function with a calculated ejection fraction of 50%. On delayed enhancement imaging, there is no abnormal hyperenhancement to suggest myocardial scar/inflammation/infiltration. Collectively, these findings are consistent with an idiopathic nonischemic cardiomyopathy.       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   Treatment options reviewed  He wants to consider    Patient to follow up with Primary Care provider regarding elevated blood pressure.    Twenty-five minutes spent with patient, all of which were spent face-to-face counseling, consulting, coordinating plan of care.      Seamus Reagan

## 2018-09-26 NOTE — PATIENT INSTRUCTIONS
MY CONTACT NUMBERS ARE:   DOCTOR : Seamus Reagan  SLEEP CENTER :   CPAP EQUIPMENT :    IF I HAVE SLEEP APNEA.....  WHERE CAN I FIND MORE INFORMATION?    American Academy of [employer] Patient information on sleep disorders:  http://yoursleep.aasmnet.org    THINGS TO REMEMBER  In most situations, sleep apnea is a lifelong disease that must be managed with daily therapy. Untreated disease, when severe, may result in an increased risk for an array of problems from heart disease to mood changes, car accidents and shorter lifespan.    CPAP -  WHY AND HOW?  Continuous positive airway pressure, or CPAP, is the most effective treatment for obstructive sleep apnea. A decision to use CPAP is a major step forward in the pursuit of a healthier life. The successful use of CPAP will help you breathe easier, sleep better and live healthier. Using CPAP can be a positive experience if you keep these gurrola points in mind:  1. Commitment  CPAP is not a quick fix for your problem. It involves a long-term commitment to improve your sleep and your health.    2. Communication  Stay in close communication with both your sleep doctor and your CPAP supplier. Ask lots of questions and seek help when you need it.    3. Consistency  Use CPAP all night, every night and for every nap. You will receive the maximum health benefits from CPAP when you use it every time that you sleep. This will also make it easier for your body to adjust to the treatment.    4. Correction  The first machine and mask that you try may not be the best ones for you. Work with your sleep doctor and your CPAP supplier to make corrections to your equipment selection. Ask about trying a different type of machine or mask if you have ongoing problems. Make sure that your mask is a good fit and learn to use your equipment properly.    5. Challenge  Tell a family member or close friend to ask you each morning if you used your CPAP the previous night. Have someone to challenge  "you to give it your best effort.    6. Connection   Your adjustment to CPAP will be easier if you are able to connect with others who use the same treatment. Ask your sleep doctor if there is a support group in your area for people who have sleep apnea, or look for one on the Internet.    7. Comfort   Increase your level of comfort by using a saline spray, decongestant or heated humidifier if CPAP irritates your nose, mouth or throat. Use your unit's \"ramp\" setting to slowly get used to the air pressure level. There may be soft pads you can buy that will fit over your mask straps. Look on www.CPAP.com for accessories such as these straps, a pillow contoured for side-sleeping with CPAP, longer hoses, hose covers to reduce condensation, or stands to keep the hose out of your way.                                 8. Cleaning   Clean your mask, tubing and headgear on a regular basis. Put this time in your schedule so that you don't forget to do it. Check and replace the filters for your CPAP unit and humidifier.    9. Completion   Although you are never finished with CPAP therapy, you should reward yourself by celebrating the completion of your first month of treatment. Expect this first month to be your hardest period of adjustment. It will involve some trial and error as you find the machine, mask and pressure settings that are right for you.    Continuation  After your first month of treatment, continue to make a daily commitment to use your CPAP all night, every night and for every nap.    CPAP-Tips to starting with success:  Begin using your CPAP for short periods of time during the day while you watch TV or read.    Use CPAP every night and for every nap. Using it less often reduces the health benefits and makes it harder for your body to get used to it.    Newer CPAP models are virtually silent; however, if you find the sound of your CPAP machine to be bothersome, place the unit under your bed to dampen the sound. "     Make small adjustments to your mask, tubing, straps and headgear until you get the right fit. Tightening the mask may actually worsen the leak.  If it leaves significant marks on your face or irritates the bridge of your nose, it may not be the best mask for you.  Speak with the person who supplied the mask and consider trying other masks.    Use a saline nasal spray to ease mild nasal congestion. Neti-Pot or saline nasal rinses may also help. Nasal gel sprays can help reduce nasal dryness.  Biotene mouthwash can be helpful to protect your teeth if you experience frequent dry mouth.  Dry mouth may be a sign of air escaping out of your mouth or out of the mask in the case of a full face mask.  Speak with your provider if you expect that is the case.     Take a nasal decongestant to relieve more severe nasal or sinus congestion.  Do not use Afrin (oxymetazoline) nasal spray more than 3 days in a row.  Speak with your sleep doctor if your nasal congestion is chronic.    Use a heated humidifier that fits your CPAP model to enhance your breathing comfort. Adjust the heat setting up if you get a dry nose or throat, down if you get condensation in the hose or mask.  Position the CPAP lower than you so that any condensation in the hose drains back into the machine rather than towards the mask.    Try a system that uses nasal pillows if traditional masks give you problems.    Clean your mask, tubing and headgear once a week. Make sure the equipment dries fully.    Regularly check and replace the filters for your CPAP unit and humidifier.    Work closely with your sleep doctor and your CPAP supplier to make sure that you have the machine, mask and air pressure setting that works best for you.    BESIDES CPAP, WHAT OTHER THERAPIES ARE THERE?    Postioning devices if you only have the problem on your back    Dental devices if your condition is mild    Nasal valves may be effective though experience is limited    Tongue  Retaining Device if missing teeth precludes the use of a dental device    Weight loss if you are overweight    Surgery in limited cases where devices are not acceptable or there are problems with structures in the nose and throat  If treated with one of these alternative options, further evaluation is necessary to ensure that the therapy is effective. This may require some form of testing.     Healthy Lifestyle:  Healthy diet, exercise and Limit alcohol: Not only will excessive alcohol increase your weight over time, but it irritates the throat tissues and make them swell, shrinking the airway and causing snoring. Drinking alcohol should be limited and stopped within 3-4 hours before going to bed.   Stop smoking: (Red swollen throat, heat, nicotine), also irritates and swells the airway, among numerous other negative health consequences.    Positioning Device  This example shows a pillow that straps around the waist. It may be appropriate for those whose sleep study shows milder sleep apnea that occurs primarily when lying flat on one's back. Preliminary studies have shown benefit but effectiveness at home should be verified.    Nasal Valves              Nasal valves may not be effective if you have frequent nasal congestion or have difficulty breathing through your nose. They may be an option for mild apnea if other options are not well tolerated. The efficacy of these devices is generally less than CPAP or oral appliances.  Oral Appliance  These are examples of two of many custom-made devices that are more likely to work in mild sleep apnea  Oral appliances are dental mouth pieces that fit very much like a sports mouth guards or removable orthodontic retainers. They are used to treat snoring  And obstructive sleep apnea . The device prevents the airway from collapsing by either holding the tongue or supporting the jaw in a forward position. Since oral appliances are non-invasive and easy to use, they may be  considered as an early treatment option. Oral appliance therapy (OAT) involves the customization, selection, fabrication, fitting, adjustments and long-term follow-up care of specially designed oral devices, worn during sleep, which reposition the lower jaw and tongue base forward to maintain an open airway.  Custom made oral appliances are proven to be more effective than over-the-counter devices. Therefore, the over-the-counter devices are recommended not to be used as a screening tool nor as a therapeutic option.  Who gets a dental device?  Oral appliance therapy can be used as an alternative to  CPAP therapy for the treatment of mild to moderate sleep apnea and for those patients who prefer OAT to CPAP. Oral appliance therapy is a first line therapy for the treatment of primary snoring. Additionally, OAT is an option for those that cannot tolerate CPAP as therapy or who have experienced insufficient surgical results.    Possible side effects?  Frequent but minor side effects include: excessive salivation, dry mouth, discomfort of teeth and jaw and temporary changes in the patient s bite.  Potential complications include: jaw pain, permanent occlusal changes and TMJ symptoms.  The above mentioned side effects and complications can be recognized and managed by dentists trained in dental sleep medicine.    Finding a dentist that practices dental sleep medicine  Specific training is available through the American Academy of Dental Sleep Medicine for dentists interested in working in the field of sleep. To find a dentist who is educated in the field of sleep and the use of oral appliances, near you, visit the Web site of the American Academy of Dental Sleep Medicine; also see http://www.accpstorage.org/newOrganization/patients/oralAppliances.pdf   To search for a dentist certified in these  practices:  Http://aadsm.org/FindADentist.aspx?1  Http://www.accpstorage.org/newOrganization/patients/oralAppliances.pdf    Tongue Retaining Device               Tongue Retaining Devices are devices that generally  suction cup  onto the tongue preventing it from falling back into the back of the throat during sleep.  They may be an option for people missing teeth, but can be uncomfortable. This particular device can be purchased online, but similar devices made by dentists fit more precisely and may be tolerated better. In general, they are rarely effective and often not very well tolerated.    Weight Loss:  Some patients may experience reduction or elimination of sleep apnea with weight loss.  Though there are significant health benefits from weight loss, long-term weight loss is very difficult to achieve- studies show success with dietary management in less that 10% of people.     If you are interested in dietary weight loss, you should review the options discussed at the National Institutes of Health patient information site:     Http:/www.health.nih.gov/topic/WeightLossDieting    Bariatric programs offer counseling in all methods of weight loss:    Http:/www.[facility].org/Specialties/WeightLossSurgeryandMedicalMgmt/htm    Surgery:  There are a number of surgeries that have been attempted to treat apnea. In general, surgical options are usually reserved for cases in which there is a physical abnormality contributing to obstruction or other treatment options are ineffective or not tolerated. Most surgical options are either unreliable or quite invasive. One of the more common procedures is:  Uvulopalatopharyngoplasty: In this procedure, the uvula (the finger-like tissue that hangs in the back of the throat), part of the soft palate (the tissue that the uvula is attached to), and sometimes the tonsils or adenoids are removed. The efficacy of this surgery is around 30-50% .  After surgery, complications may  "include:  Sleepiness and sleep apnea related to post-surgery medication   Swelling, infection and bleeding   A sore throat and/or difficulty swallowing   Drainage of secretions into the nose and a nasal quality to the voice. English language speech does not seem to be affected by this surgery.   Narrowing of the airway in the nose and throat (hence constricting breathing) snoring and even iatrogenically caused sleep apnea. By cutting the tissues, excess scar tissue can \"tighten\" the airway and make it even smaller than it was before UPPP.  Patients who have had the uvula removed will become unable to correctly speak [place]ian or any other language that has a uvular 'r' phoneme.    Surgeries to help resolve nasal congestion may help reduce the severity of apnea slightly. Nasal congestion does not cause apnea on its own, so these surgeries are usually not performed just for HUEY.  They may be worth considering if the nasal congestion is significantly bothersome independent of apnea.      Your BMI is Body mass index is 28.94 kg/(m^2).  Weight management is a personal decision.  If you are interested in exploring weight loss strategies, the following discussion covers the approaches that may be successful. Body mass index (BMI) is one way to tell whether you are at a healthy weight, overweight, or obese. It measures your weight in relation to your height.  A BMI of 18.5 to 24.9 is in the healthy range. A person with a BMI of 25 to 29.9 is considered overweight, and someone with a BMI of 30 or greater is considered obese. More than two-thirds of [place] adults are considered overweight or obese.  Being overweight or obese increases the risk for further weight gain. Excess weight may lead to heart disease and diabetes.  Creating and following plans for healthy eating and physical activity may help you improve your health.  Weight control is part of healthy lifestyle and includes exercise, emotional health, and healthy eating " habits. Careful eating habits lifelong are the mainstay of weight control. Though there are significant health benefits from weight loss, long-term weight loss with diet alone may be very difficult to achieve- studies show long-term success with dietary management in less than 10% of people. Attaining a healthy weight may be especially difficult to achieve in those with severe obesity. In some cases, medications, devices and surgical management might be considered.  What can you do?  If you are overweight or obese and are interested in methods for weight loss, you should discuss this with your provider.     Consider reducing daily calorie intake by 500 calories.     Keep a food journal.     Avoiding skipping meals, consider cutting portions instead.    Diet combined with exercise helps maintain muscle while optimizing fat loss. Strength training is particularly important for building and maintaining muscle mass. Exercise helps reduce stress, increase energy, and improves fitness. Increasing exercise without diet control, however, may not burn enough calories to loose weight.       Start walking three days a week 10-20 minutes at a time    Work towards walking thirty minutes five days a week     Eventually, increase the speed of your walking for 1-2 minutes at time    In addition, we recommend that you review healthy lifestyles and methods for weight loss available through the National Institutes of Health patient information sites:  http://win.niddk.nih.gov/publications/index.htm    And look into health and wellness programs that may be available through your health insurance provider, employer, local community center, or christian club.    Weight management plan: Patient was referred to their PCP to discuss a diet and exercise plan.

## 2018-09-26 NOTE — NURSING NOTE
"Chief Complaint   Patient presents with     Study Results       Initial BP (!) 134/93  Pulse 84  Ht 1.753 m (5' 9\")  Wt 88.9 kg (196 lb)  SpO2 96%  BMI 28.94 kg/m2 Estimated body mass index is 28.94 kg/(m^2) as calculated from the following:    Height as of this encounter: 1.753 m (5' 9\").    Weight as of this encounter: 88.9 kg (196 lb).    Medication Reconciliation: complete        "

## 2018-09-26 NOTE — MR AVS SNAPSHOT
After Visit Summary   9/26/2018    Eloise Johnson    MRN: [id number]           Patient Information     Date Of Birth          1970        Visit Information        Provider Department      9/26/2018 3:00 PM Seamus Reagan MD [facility]        Today's Diagnoses     HUEY (obstructive sleep apnea)          Care Instructions    MY CONTACT NUMBERS ARE:   DOCTOR : Seamus Reagan  SLEEP CENTER :   CPAP EQUIPMENT :    IF I HAVE SLEEP APNEA.....  WHERE CAN I FIND MORE INFORMATION?    American Academy of Sleep Medicine Patient information on sleep disorders:  http://yoursleep.aasmnet.org    THINGS TO REMEMBER  In most situations, sleep apnea is a lifelong disease that must be managed with daily therapy. Untreated disease, when severe, may result in an increased risk for an array of problems from heart disease to mood changes, car accidents and shorter lifespan.    CPAP -  WHY AND HOW?  Continuous positive airway pressure, or CPAP, is the most effective treatment for obstructive sleep apnea. A decision to use CPAP is a major step forward in the pursuit of a healthier life. The successful use of CPAP will help you breathe easier, sleep better and live healthier. Using CPAP can be a positive experience if you keep these gurrola points in mind:  1. Commitment  CPAP is not a quick fix for your problem. It involves a long-term commitment to improve your sleep and your health.    2. Communication  Stay in close communication with both your sleep doctor and your CPAP supplier. Ask lots of questions and seek help when you need it.    3. Consistency  Use CPAP all night, every night and for every nap. You will receive the maximum health benefits from CPAP when you use it every time that you sleep. This will also make it easier for your body to adjust to the treatment.    4. Correction  The first machine and mask that you try may not be the best ones for you. Work with your sleep doctor and your CPAP supplier to make  "corrections to your equipment selection. Ask about trying a different type of machine or mask if you have ongoing problems. Make sure that your mask is a good fit and learn to use your equipment properly.    5. Challenge  Tell a family member or close friend to ask you each morning if you used your CPAP the previous night. Have someone to challenge you to give it your best effort.    6. Connection   Your adjustment to CPAP will be easier if you are able to connect with others who use the same treatment. Ask your sleep doctor if there is a support group in your area for people who have sleep apnea, or look for one on the Internet.    7. Comfort   Increase your level of comfort by using a saline spray, decongestant or heated humidifier if CPAP irritates your nose, mouth or throat. Use your unit's \"ramp\" setting to slowly get used to the air pressure level. There may be soft pads you can buy that will fit over your mask straps. Look on www.CPAP.com for accessories such as these straps, a pillow contoured for side-sleeping with CPAP, longer hoses, hose covers to reduce condensation, or stands to keep the hose out of your way.                                 8. Cleaning   Clean your mask, tubing and headgear on a regular basis. Put this time in your schedule so that you don't forget to do it. Check and replace the filters for your CPAP unit and humidifier.    9. Completion   Although you are never finished with CPAP therapy, you should reward yourself by celebrating the completion of your first month of treatment. Expect this first month to be your hardest period of adjustment. It will involve some trial and error as you find the machine, mask and pressure settings that are right for you.    Continuation  After your first month of treatment, continue to make a daily commitment to use your CPAP all night, every night and for every nap.    CPAP-Tips to starting with success:  Begin using your CPAP for short periods of time " during the day while you watch TV or read.    Use CPAP every night and for every nap. Using it less often reduces the health benefits and makes it harder for your body to get used to it.    Newer CPAP models are virtually silent; however, if you find the sound of your CPAP machine to be bothersome, place the unit under your bed to dampen the sound.     Make small adjustments to your mask, tubing, straps and headgear until you get the right fit. Tightening the mask may actually worsen the leak.  If it leaves significant marks on your face or irritates the bridge of your nose, it may not be the best mask for you.  Speak with the person who supplied the mask and consider trying other masks.    Use a saline nasal spray to ease mild nasal congestion. Neti-Pot or saline nasal rinses may also help. Nasal gel sprays can help reduce nasal dryness.  Biotene mouthwash can be helpful to protect your teeth if you experience frequent dry mouth.  Dry mouth may be a sign of air escaping out of your mouth or out of the mask in the case of a full face mask.  Speak with your provider if you expect that is the case.     Take a nasal decongestant to relieve more severe nasal or sinus congestion.  Do not use Afrin (oxymetazoline) nasal spray more than 3 days in a row.  Speak with your sleep doctor if your nasal congestion is chronic.    Use a heated humidifier that fits your CPAP model to enhance your breathing comfort. Adjust the heat setting up if you get a dry nose or throat, down if you get condensation in the hose or mask.  Position the CPAP lower than you so that any condensation in the hose drains back into the machine rather than towards the mask.    Try a system that uses nasal pillows if traditional masks give you problems.    Clean your mask, tubing and headgear once a week. Make sure the equipment dries fully.    Regularly check and replace the filters for your CPAP unit and humidifier.    Work closely with your sleep doctor  and your CPAP supplier to make sure that you have the machine, mask and air pressure setting that works best for you.    BESIDES CPAP, WHAT OTHER THERAPIES ARE THERE?    Postioning devices if you only have the problem on your back    Dental devices if your condition is mild    Nasal valves may be effective though experience is limited    Tongue Retaining Device if missing teeth precludes the use of a dental device    Weight loss if you are overweight    Surgery in limited cases where devices are not acceptable or there are problems with structures in the nose and throat  If treated with one of these alternative options, further evaluation is necessary to ensure that the therapy is effective. This may require some form of testing.     Healthy Lifestyle:  Healthy diet, exercise and Limit alcohol: Not only will excessive alcohol increase your weight over time, but it irritates the throat tissues and make them swell, shrinking the airway and causing snoring. Drinking alcohol should be limited and stopped within 3-4 hours before going to bed.   Stop smoking: (Red swollen throat, heat, nicotine), also irritates and swells the airway, among numerous other negative health consequences.    Positioning Device  This example shows a pillow that straps around the waist. It may be appropriate for those whose sleep study shows milder sleep apnea that occurs primarily when lying flat on one's back. Preliminary studies have shown benefit but effectiveness at home should be verified.    Nasal Valves              Nasal valves may not be effective if you have frequent nasal congestion or have difficulty breathing through your nose. They may be an option for mild apnea if other options are not well tolerated. The efficacy of these devices is generally less than CPAP or oral appliances.  Oral Appliance  These are examples of two of many custom-made devices that are more likely to work in mild sleep apnea  Oral appliances are dental mouth  pieces that fit very much like a sports mouth guards or removable orthodontic retainers. They are used to treat snoring  And obstructive sleep apnea . The device prevents the airway from collapsing by either holding the tongue or supporting the jaw in a forward position. Since oral appliances are non-invasive and easy to use, they may be considered as an early treatment option. Oral appliance therapy (OAT) involves the customization, selection, fabrication, fitting, adjustments and long-term follow-up care of specially designed oral devices, worn during sleep, which reposition the lower jaw and tongue base forward to maintain an open airway.  Custom made oral appliances are proven to be more effective than over-the-counter devices. Therefore, the over-the-counter devices are recommended not to be used as a screening tool nor as a therapeutic option.  Who gets a dental device?  Oral appliance therapy can be used as an alternative to  CPAP therapy for the treatment of mild to moderate sleep apnea and for those patients who prefer OAT to CPAP. Oral appliance therapy is a first line therapy for the treatment of primary snoring. Additionally, OAT is an option for those that cannot tolerate CPAP as therapy or who have experienced insufficient surgical results.    Possible side effects?  Frequent but minor side effects include: excessive salivation, dry mouth, discomfort of teeth and jaw and temporary changes in the patient s bite.  Potential complications include: jaw pain, permanent occlusal changes and TMJ symptoms.  The above mentioned side effects and complications can be recognized and managed by dentists trained in dental sleep medicine.    Finding a dentist that practices dental sleep medicine  Specific training is available through the American Academy of Dental Sleep Medicine for dentists interested in working in the field of sleep. To find a dentist who is educated in the field of sleep and the use of oral  appliances, near you, visit the Web site of the American Academy of Dental Sleep Medicine; also see http://www.accpstorage.org/newOrganization/patients/oralAppliances.pdf   To search for a dentist certified in these practices:  Http://aadsm.org/FindADentist.aspx?1  Http://www.accpstorage.org/newOrganization/patients/oralAppliances.pdf    Tongue Retaining Device               Tongue Retaining Devices are devices that generally  suction cup  onto the tongue preventing it from falling back into the back of the throat during sleep.  They may be an option for people missing teeth, but can be uncomfortable. This particular device can be purchased online, but similar devices made by dentists fit more precisely and may be tolerated better. In general, they are rarely effective and often not very well tolerated.    Weight Loss:  Some patients may experience reduction or elimination of sleep apnea with weight loss.  Though there are significant health benefits from weight loss, long-term weight loss is very difficult to achieve- studies show success with dietary management in less that 10% of people.     If you are interested in dietary weight loss, you should review the options discussed at the National Institutes of Health patient information site:     Http:/www.health.nih.gov/topic/WeightLossDieting    Bariatric programs offer counseling in all methods of weight loss:    Http:/www.uofedicalcenter.org/Specialties/WeightLossSurgeryandMedicalMgmt/htm    Surgery:  There are a number of surgeries that have been attempted to treat apnea. In general, surgical options are usually reserved for cases in which there is a physical abnormality contributing to obstruction or other treatment options are ineffective or not tolerated. Most surgical options are either unreliable or quite invasive. One of the more common procedures is:  Uvulopalatopharyngoplasty: In this procedure, the uvula (the finger-like tissue that hangs in the back  "of the throat), part of the soft palate (the tissue that the uvula is attached to), and sometimes the tonsils or adenoids are removed. The efficacy of this surgery is around 30-50% .  After surgery, complications may include:  Sleepiness and sleep apnea related to post-surgery medication   Swelling, infection and bleeding   A sore throat and/or difficulty swallowing   Drainage of secretions into the nose and a nasal quality to the voice. English language speech does not seem to be affected by this surgery.   Narrowing of the airway in the nose and throat (hence constricting breathing) snoring and even iatrogenically caused sleep apnea. By cutting the tissues, excess scar tissue can \"tighten\" the airway and make it even smaller than it was before UPPP.  Patients who have had the uvula removed will become unable to correctly speak Albanian or any other language that has a uvular 'r' phoneme.    Surgeries to help resolve nasal congestion may help reduce the severity of apnea slightly. Nasal congestion does not cause apnea on its own, so these surgeries are usually not performed just for HUEY.  They may be worth considering if the nasal congestion is significantly bothersome independent of apnea.      Your BMI is Body mass index is 28.94 kg/(m^2).  Weight management is a personal decision.  If you are interested in exploring weight loss strategies, the following discussion covers the approaches that may be successful. Body mass index (BMI) is one way to tell whether you are at a healthy weight, overweight, or obese. It measures your weight in relation to your height.  A BMI of 18.5 to 24.9 is in the healthy range. A person with a BMI of 25 to 29.9 is considered overweight, and someone with a BMI of 30 or greater is considered obese. More than two-thirds of American adults are considered overweight or obese.  Being overweight or obese increases the risk for further weight gain. Excess weight may lead to heart disease and " diabetes.  Creating and following plans for healthy eating and physical activity may help you improve your health.  Weight control is part of healthy lifestyle and includes exercise, emotional health, and healthy eating habits. Careful eating habits lifelong are the mainstay of weight control. Though there are significant health benefits from weight loss, long-term weight loss with diet alone may be very difficult to achieve- studies show long-term success with dietary management in less than 10% of people. Attaining a healthy weight may be especially difficult to achieve in those with severe obesity. In some cases, medications, devices and surgical management might be considered.  What can you do?  If you are overweight or obese and are interested in methods for weight loss, you should discuss this with your provider.     Consider reducing daily calorie intake by 500 calories.     Keep a food journal.     Avoiding skipping meals, consider cutting portions instead.    Diet combined with exercise helps maintain muscle while optimizing fat loss. Strength training is particularly important for building and maintaining muscle mass. Exercise helps reduce stress, increase energy, and improves fitness. Increasing exercise without diet control, however, may not burn enough calories to loose weight.       Start walking three days a week 10-20 minutes at a time    Work towards walking thirty minutes five days a week     Eventually, increase the speed of your walking for 1-2 minutes at time    In addition, we recommend that you review healthy lifestyles and methods for weight loss available through the National Institutes of Health patient information sites:  http://win.niddk.nih.gov/publications/index.htm    And look into health and wellness programs that may be available through your health insurance provider, employer, local community center, or christian club.    Weight management plan: Patient was referred to their PCP to  "discuss a diet and exercise plan.              Follow-ups after your visit        Your next 10 appointments already scheduled     Oct 02, 2018 10:00 AM CDT   (Arrive by 9:45 AM)   NEW KNEE with Eladia Linares MD   [facility] ([facility])    [address] [place] [id number] [phone]              Who to contact     If you have questions or need follow up information about today's clinic visit or your schedule please contact [facility] directly at [phone].  Normal or non-critical lab and imaging results will be communicated to you by Whiphandhart, letter or phone within 4 business days after the clinic has received the results. If you do not hear from us within 7 days, please contact the clinic through [email or web address] or phone. If you have a critical or abnormal lab result, we will notify you by phone as soon as possible.  Submit refill requests through [employer] or call your pharmacy and they will forward the refill request to us. Please allow 3 business days for your refill to be completed.          Additional Information About Your Visit        MyChart Information     [employer] gives you secure access to your electronic health record. If you see a primary care provider, you can also send messages to your care team and make appointments. If you have questions, please call your primary care clinic.  If you do not have a primary care provider, please call [phone] and they will assist you.        Care EveryWhere ID     This is your Care EveryWhere ID. This could be used by other organizations to access your [facility] medical records  [id number]        Your Vitals Were     Pulse Height Pulse Oximetry BMI (Body Mass Index)          84 1.753 m (5' 9\") 96% 28.94 kg/m2         Blood Pressure from Last 3 Encounters:   09/26/18 (!) 134/93   08/23/18 (!) 147/102   07/10/18 142/89    Weight from Last 3 Encounters:   09/26/18 88.9 kg (196 lb) "   08/23/18 89.8 kg (198 lb)   07/10/18 88 kg (194 lb)              Today, you had the following     No orders found for display       Primary Care Provider Office Phone # Fax #    Fabian Crowder [phone] [address] [address]  [place] [address]        Equal Access to Services     [facility]: Hadii aad ku hadasho Soomaali, waaxda luqadaha, qaybta kaalmada adeegyada, waxay idiin hayaan adeeg kharash laDivineaan . So [facility] [phone].    ATENCIÓN: Si habla español, tiene a laughlin disposición servicios gratuitos de asistencia lingüística. Andreas al [phone].    We comply with applicable federal civil rights laws and [place] laws. We do not discriminate on the basis of race, color, national origin, age, disability, sex, sexual orientation, or gender identity.            Thank you!     Thank you for choosing [facility]  for your care. Our goal is always to provide you with excellent care. Hearing back from our patients is one way we can continue to improve our services. Please take a few minutes to complete the written survey that you may receive in the mail after your visit with us. Thank you!             Your Updated Medication List - Protect others around you: Learn how to safely use, store and throw away your medicines at www.disposemymeds.org.          This list is accurate as of 9/26/18  3:26 PM.  Always use your most recent med list.                   Brand Name Dispense Instructions for use Diagnosis    aspirin 81 MG EC tablet      Take 1 tablet (81 mg) by mouth every other day    Hyperlipidemia LDL goal < 160       atorvastatin 10 MG tablet    LIPITOR    90 tablet    Take 1 tablet (10 mg) by mouth daily    Mixed hyperlipidemia       Multi-vitamin Tabs tablet   Generic drug:  multivitamin, therapeutic with minerals      Take 1 tablet by mouth daily.        omega-3 fatty acids 1200 MG capsule      Take 2 capsules by mouth daily.        vitamin D 1000 units capsule       Take 1 capsule by mouth daily

## 2018-09-28 ENCOUNTER — PRE VISIT (OUTPATIENT)
Dept: ORTHOPEDICS | Facility: CLINIC | Age: 48
End: 2018-09-28

## 2018-09-28 NOTE — TELEPHONE ENCOUNTER
/FUTURE VISIT INFORMATION      FUTURE VISIT INFORMATION:    Date: 102    Time: 10:00    Location: [facility]  REFERRAL INFORMATION:    Referring provider:      Referring providers clinic:      Reason for visit/diagnosis  r knee meniscus injury    RECORDS REQUESTED FROM:       Clinic name Comments Records Status Imaging Status                                         RECORDS STATUS      9/27 - left vm with pt regarding medical records for appt  9/28 - left 2nd vm with pt regarding records for appt

## 2018-10-02 ENCOUNTER — DOCUMENTATION ONLY (OUTPATIENT)
Dept: ORTHOPEDICS | Facility: CLINIC | Age: 48
End: 2018-10-02

## 2018-10-02 DIAGNOSIS — G89.29 CHRONIC PAIN OF RIGHT KNEE: Primary | ICD-10-CM

## 2018-10-02 DIAGNOSIS — M25.561 CHRONIC PAIN OF RIGHT KNEE: Primary | ICD-10-CM

## 2018-10-02 NOTE — PROGRESS NOTES
Patient checked in for appointment with Dr Linares 25 after his scheduled appointment time.  Due to not having records prior we were unable to see if this patient was correctly scheduled with Dr. Linares.  He was called twice prior to appointment with no answer and voicemails left twice.  Dr. Linares no longer sees for meniscus injuries.  Patient did hand carry a disc with MRI imaging on it.  We offered for Dr. Linares to take a look at the MRI and decide if patient could be seen by Dr. Torres instead today.  Patient got upset and stated he should have been told prior to the appointment that this was not something Dr. Linares sees for.  We apologized for the miscommunication but we were unable to reach the patient prior to the appointment to discuss this with him.  Patient threw his locater badge at the [facility] staff and walked out of the clinic.  He was not checked in for this appointment.

## 2018-10-03 ENCOUNTER — TELEPHONE (OUTPATIENT)
Dept: SLEEP MEDICINE | Facility: CLINIC | Age: 48
End: 2018-10-03

## 2018-10-03 DIAGNOSIS — G47.33 OSA (OBSTRUCTIVE SLEEP APNEA): Primary | ICD-10-CM

## 2018-10-03 DIAGNOSIS — G47.33 OSA (OBSTRUCTIVE SLEEP APNEA): ICD-10-CM

## 2018-10-03 NOTE — TELEPHONE ENCOUNTER
Left patient a vm to call [facility]'s mainline to schedule CPAP setup appt.

## 2018-10-03 NOTE — TELEPHONE ENCOUNTER
Patient left voicemail stating he would like an order to start CPAP. Please call him when order is ready.

## 2018-10-03 NOTE — TELEPHONE ENCOUNTER
Called patient and gave him the phone number to [facility] in [place] to get his machine. He would like to get set up here at [place].

## 2018-10-10 ENCOUNTER — PRE VISIT (OUTPATIENT)
Dept: ORTHOPEDICS | Facility: CLINIC | Age: 48
End: 2018-10-10

## 2018-10-10 NOTE — TELEPHONE ENCOUNTER
Called and left message with [facility] to try to get records.  Pt apparently has imaging at [facility], called and had them push images and fax report.     Rashi Putnam RN

## 2018-10-11 ENCOUNTER — TRANSFERRED RECORDS (OUTPATIENT)
Dept: HEALTH INFORMATION MANAGEMENT | Facility: CLINIC | Age: 48
End: 2018-10-11

## 2018-10-11 ENCOUNTER — OFFICE VISIT (OUTPATIENT)
Dept: ORTHOPEDICS | Facility: CLINIC | Age: 48
End: 2018-10-11
Payer: COMMERCIAL

## 2018-10-11 VITALS
BODY MASS INDEX: 25.84 KG/M2 | DIASTOLIC BLOOD PRESSURE: 92 MMHG | HEIGHT: 73 IN | WEIGHT: 195 LBS | SYSTOLIC BLOOD PRESSURE: 134 MMHG | HEART RATE: 67 BPM | OXYGEN SATURATION: 97 %

## 2018-10-11 DIAGNOSIS — S83.241A TEAR OF MEDIAL MENISCUS OF RIGHT KNEE, UNSPECIFIED TEAR TYPE, UNSPECIFIED WHETHER OLD OR CURRENT TEAR, INITIAL ENCOUNTER: Primary | ICD-10-CM

## 2018-10-11 PROCEDURE — 99213 OFFICE O/P EST LOW 20 MIN: CPT | Mod: GC | Performed by: ORTHOPAEDIC SURGERY

## 2018-10-11 ASSESSMENT — PAIN SCALES - GENERAL: PAINLEVEL: NO PAIN (0)

## 2018-10-11 NOTE — LETTER
10/11/2018         RE: Eloise Johnson  [address] [facility] [address]        Dear Colleague,    Thank you for referring your patient, Eloise Johnson, to the [facility]. Please see a copy of my visit note below.    CHIEF CONCERN: Right knee pain    HISTORY:   48-year-old male who had onset of right knee pain for the past few months.  He does not recall he particular injury or event but just noted that his right knee started hurting.  Pain is localized to the medial aspect of the knee joint.  He has not really done anything for the pain however he was seen in consultation at [facility] orthopedics and worked up with x-rays and MRI which revealed a posterior horn of the medial meniscus tear, horizontal tear.  They recommended arthroscopic partial meniscectomy.  He presents to our clinic for a second opinion regarding whether or not he needs surgery particularly because he feels the symptoms have gotten better.  He does state that he has medial sided knee pain when arising from a seated position and sitting with his knee flexed for a prolonged duration of time.  However after a few steps this seems to settle out and it is no longer a problem.  He is still able to go to the gym but he avoids any cutting or pivoting activities and does only leg workouts that do not hurt.  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  His knee does not limit his ability to do his work    PAST MEDICAL HISTORY: (Reviewed with the patient and in the [facility] medical record)  1. Hypertension, not on any medication    PAST SURGICAL HISTORY: (Reviewed with the patient and in the [facility] medical record)  1. No prior surgeries    MEDICATIONS: (Reviewed with the patient and in the [facility] medical record)    Notable medications include: None    ALLERGIES: (Reviewed with the patient and in the [facility] medical  record)  1. None      SOCIAL HISTORY: (Reviewed with the patient and in the medical record)  --Tobacco: Former smoker, quit 15 years ago.  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  --Avocation/Sport: Does cardio at the gym    FAMILY HISTORY: (Reviewed with the patient and in the medical record)  -- No family history of bleeding, clotting, or difficulty with anesthesia        REVIEW OF SYSTEMS: (Reviewed with the patient and on the health intake form)  -- A comprehensive 10 point review of systems was conducted and is negative except as noted in the HPI    EXAM:     General: Awake, Alert and Oriented, No acute Distress. Articulate and Interactive    There is no height or weight on file to calculate BMI.    Right lower extremity :    Skin is Warm and Well perfused, no suggestion of infection    [Incisions] none    [effusion] 1+    [tenderness] no tenderness to palpation over the medial joint line or lateral joint line or the inferior pole patella.    [range of motion] range of motion is 0-140 degrees however he has medial sided joint pain with deep knee flexion.    [stability exam] stable to varus and valgus stress and Lachman's.  No pain with patellar apprehension or patellar grind.  Negative pivot shift.    EHL/FHL/TA/GS 5/5    Sensation intact L3-S1    2+ posterior tibialis pulse       IMAGING:    Plain Radiographs: No osseous abnormality visualized.  There appears to be maintenance of the joint space in the medial and lateral compartments though some slight narrowing in the medial compartment.    MRI: There is fluid signal in the posterior horn of the medial meniscus that goes down to the cartilage of the tibial plateau.  The radiology read this measures 1.5 cm in length and is flipped out. the ACL appears intact however there is some attenuation at the proximal  origin.  PCL appears somewhat redundant and there is a bony contusion on the lateral femoral condyle.  There is cartilage thinning of the medial and lateral compartment, more localized to the femoral condyles.  Some fissuring in the patellar articular surface.    ASSESSMENT:  1. 48-year-old male with right knee pain secondary to mild medial compartment degenerative changes and a horizontal medial meniscus tear.  Symptoms have gotten better over time.    PLAN:  1. We recommend a dedicated physical therapy program to occur over the next 6-8 weeks with a focus on knee stabilization exercises, gluteal and hamstring strengthening.  2. He was also counseled that if his symptoms do not improve with a dedicated therapy program and he has an acute flare of his knee pain we could offer him a corticosteroid injection.  Also if he became increasingly symptomatic we could offer him right knee arthroscopic partial meniscectomy.    Patient seen and examined with Dr. Lola Troncoso PGY-5  Orthopedic Surgery          Patient seen and examined with the resident.     Assesment: Right medial meniscus tear, minimally symptomatic    Plan: I had a long discussion today with  the patient.  At this time he is minimally symptomatic and therefore I would struggle to recommend that he should have surgery as he is so minimally symptomatic.  If his symptoms should worsen or should become more symptomatic in the future I think consideration of arthroscopic meniscectomy would be very reasonable.  I discussed this with him.  He will let me know if it starts to bother him more in the future.  I do not have any specific restrictions for him though if his symptoms should worsen or return or his activity should become limited consideration of an arthroscopic meniscectomy would be reasonable.    I agree with history, physical and imaging as well as the assessment and plan as detailed by Dr. Troncoso.       Again, thank you for allowing  me to participate in the care of your patient.        Sincerely,        Conrad Davila MD

## 2018-10-11 NOTE — PROGRESS NOTES
Patient seen and examined with the resident.     Assesment: Right medial meniscus tear, minimally symptomatic    Plan: I had a long discussion today with  the patient.  At this time he is minimally symptomatic and therefore I would struggle to recommend that he should have surgery as he is so minimally symptomatic.  If his symptoms should worsen or should become more symptomatic in the future I think consideration of arthroscopic meniscectomy would be very reasonable.  I discussed this with him.  He will let me know if it starts to bother him more in the future.  I do not have any specific restrictions for him though if his symptoms should worsen or return or his activity should become limited consideration of an arthroscopic meniscectomy would be reasonable.    I agree with history, physical and imaging as well as the assessment and plan as detailed by Dr. Troncoso.

## 2018-10-11 NOTE — MR AVS SNAPSHOT
After Visit Summary   10/11/2018    Eloise Johnson    MRN: [id number]           Patient Information     Date Of Birth          1970        Visit Information        Provider Department      10/11/2018 10:30 AM Conrad Davila MD [facility]        Today's Diagnoses     Tear of medial meniscus of right knee, unspecified tear type, unspecified whether old or current tear, initial encounter    -  1       Follow-ups after your visit        Additional Services     PHYSICAL THERAPY REFERRAL (Internal)       Physical Therapy Referral: [facility] [place]                  Your next 10 appointments already scheduled     Oct 16, 2018 10:00 AM CDT   PAP SETUP with JENNIFER SALGADO   [facility] [place]    [address] [address] [phone]              Future tests that were ordered for you today     Open Future Orders        Priority Expected Expires Ordered    PHYSICAL THERAPY REFERRAL (Internal) Routine  10/11/2019 10/11/2018            Who to contact     If you have questions or need follow up information about today's clinic visit or your schedule please contact [facility] directly at [phone].  Normal or non-critical lab and imaging results will be communicated to you by MyChart, letter or phone within 4 business days after the clinic has received the results. If you do not hear from us within 7 days, please contact the clinic through [employer] or phone. If you have a critical or abnormal lab result, we will notify you by phone as soon as possible.  Submit refill requests through [employer] or call your pharmacy and they will forward the refill request to us. Please allow 3 business days for your refill to be completed.          Additional Information About Your Visit        [facility] [employer] Information     [employer] gives you secure access to your electronic  "health record. If you see a primary care provider, you can also send messages to your care team and make appointments. If you have questions, please call your primary care clinic.  If you do not have a primary care provider, please call [phone] and they will assist you.      P[employer] is an electronic gateway that provides easy, online access to your medical records. With [employer], you can request a clinic appointment, read your test results, renew a prescription or communicate with your care team.     To access your existing account, please contact your [facility] or call [phone] for assistance.        Care EveryWhere ID     This is your Care EveryWhere ID. This could be used by other organizations to access your [place] medical records  [id number]        Your Vitals Were     Pulse Height Pulse Oximetry BMI (Body Mass Index)          67 1.842 m (6' 0.5\") 97% 26.08 kg/m2         Blood Pressure from Last 3 Encounters:   10/11/18 (!) 134/92   09/26/18 (!) 134/93   08/23/18 (!) 147/102    Weight from Last 3 Encounters:   10/11/18 88.5 kg (195 lb)   09/26/18 88.9 kg (196 lb)   08/23/18 89.8 kg (198 lb)               Primary Care Provider Office Phone # Fax #    Fabian Crowder [phone] [address] [facility] [address]        Equal Access to Services     [facility] AH: Hadii aad ku hadasho Soomaali, waaxda luqadaha, qaybta kaalmada adeegyada, raphael chu'daly whitt. So [facility] [phone].    ATENCIÓN: Si habla español, tiene a laughlin disposición servicios gratuitos de asistencia lingüística. Llame al [phone].    We comply with applicable federal civil rights laws and [place] laws. We do not discriminate on the basis of race, color, national origin, age, disability, sex, sexual orientation, or gender identity.            Thank you!     Thank you for choosing [facility]  for your care. Our goal is always to " provide you with excellent care. Hearing back from our patients is one way we can continue to improve our services. Please take a few minutes to complete the written survey that you may receive in the mail after your visit with us. Thank you!             Your Updated Medication List - Protect others around you: Learn how to safely use, store and throw away your medicines at www.disposemymeds.org.          This list is accurate as of 10/11/18  2:03 PM.  Always use your most recent med list.                   Brand Name Dispense Instructions for use Diagnosis    aspirin 81 MG EC tablet      Take 1 tablet (81 mg) by mouth every other day    Hyperlipidemia LDL goal < 160       atorvastatin 10 MG tablet    LIPITOR    90 tablet    Take 1 tablet (10 mg) by mouth daily    Mixed hyperlipidemia       Multi-vitamin Tabs tablet   Generic drug:  multivitamin, therapeutic with minerals      Take 1 tablet by mouth daily.        omega-3 fatty acids 1200 MG capsule      Take 2 capsules by mouth daily.        order for DME     1 Device    autoCPAP 5-18 cmH20    HUEY (obstructive sleep apnea)       vitamin D 1000 units capsule      Take 1 capsule by mouth daily

## 2018-10-11 NOTE — PROGRESS NOTES
CHIEF CONCERN: Right knee pain    HISTORY:   48-year-old male who had onset of right knee pain for the past few months.  He does not recall he particular injury or event but just noted that his right knee started hurting.  Pain is localized to the medial aspect of the knee joint.  He has not really done anything for the pain however he was seen in consultation at [facility] orthopedics and worked up with x-rays and MRI which revealed a posterior horn of the medial meniscus tear, horizontal tear.  They recommended arthroscopic partial meniscectomy.  He presents to our clinic for a second opinion regarding whether or not he needs surgery particularly because he feels the symptoms have gotten better.  He does state that he has medial sided knee pain when arising from a seated position and sitting with his knee flexed for a prolonged duration of time.  However after a few steps this seems to settle out and it is no longer a problem.  He is still able to go to the gym but he avoids any cutting or pivoting activities and does only leg workouts that do not hurt.  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  His knee does not limit his ability to do his work    PAST MEDICAL HISTORY: (Reviewed with the patient and in the [facility] medical record)  1. Hypertension, not on any medication    PAST SURGICAL HISTORY: (Reviewed with the patient and in the [facility] medical record)  1. No prior surgeries    MEDICATIONS: (Reviewed with the patient and in the [facility] medical record)    Notable medications include: None    ALLERGIES: (Reviewed with the patient and in the [facility] medical record)  1. None      SOCIAL HISTORY: (Reviewed with the patient and in the medical record)  --Tobacco: Former smoker, quit 15 years ago.  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  --Avocation/Sport: Does cardio at the gym    FAMILY HISTORY: (Reviewed with the patient and in the medical record)  -- No family history of bleeding, clotting, or difficulty with anesthesia        REVIEW OF SYSTEMS: (Reviewed with the patient and on the health intake form)  -- A comprehensive 10 point review of systems was conducted and is negative except as noted in the HPI    EXAM:     General: Awake, Alert and Oriented, No acute Distress. Articulate and Interactive    There is no height or weight on file to calculate BMI.    Right lower extremity :    Skin is Warm and Well perfused, no suggestion of infection    [Incisions] none    [effusion] 1+    [tenderness] no tenderness to palpation over the medial joint line or lateral joint line or the inferior pole patella.    [range of motion] range of motion is 0-140 degrees however he has medial sided joint pain with deep knee flexion.    [stability exam] stable to varus and valgus stress and Lachman's.  No pain with patellar apprehension or patellar grind.  Negative pivot shift.    EHL/FHL/TA/GS 5/5    Sensation intact L3-S1    2+ posterior tibialis pulse       IMAGING:    Plain Radiographs: No osseous abnormality visualized.  There appears to be maintenance of the joint space in the medial and lateral compartments though some slight narrowing in the medial compartment.    MRI: There is fluid signal in the posterior horn of the medial meniscus that goes down to the cartilage of the tibial plateau.  The radiology read this measures 1.5 cm in length and is flipped out. the ACL appears intact however there is some attenuation at the proximal origin.  PCL appears somewhat redundant and there is a bony contusion on the lateral femoral condyle.  There is cartilage thinning of the medial and lateral compartment, more localized to the femoral condyles.  Some fissuring in the patellar articular  surface.    ASSESSMENT:  1. 48-year-old male with right knee pain secondary to mild medial compartment degenerative changes and a horizontal medial meniscus tear.  Symptoms have gotten better over time.    PLAN:  1. We recommend a dedicated physical therapy program to occur over the next 6-8 weeks with a focus on knee stabilization exercises, gluteal and hamstring strengthening.  2. He was also counseled that if his symptoms do not improve with a dedicated therapy program and he has an acute flare of his knee pain we could offer him a corticosteroid injection.  Also if he became increasingly symptomatic we could offer him right knee arthroscopic partial meniscectomy.    Patient seen and examined with Dr. Lola Troncoso PGY-5  Orthopedic Surgery

## 2018-10-11 NOTE — NURSING NOTE
"Eloise He's chief complaint for this visit includes:  Chief Complaint   Patient presents with     Consult For     right knee     PCP: Fabian Crwoder    Referring Provider:  Referred Self, MD  No address on file    BP (!) 134/92  Pulse 67  Ht 1.842 m (6' 0.5\")  Wt 88.5 kg (195 lb)  SpO2 97%  BMI 26.08 kg/m2  No Pain (0)     Do you need any medication refills at today's visit? no      "

## 2018-10-16 ENCOUNTER — DOCUMENTATION ONLY (OUTPATIENT)
Dept: SLEEP MEDICINE | Facility: CLINIC | Age: 48
End: 2018-10-16
Payer: COMMERCIAL

## 2018-10-16 DIAGNOSIS — G47.33 OSA (OBSTRUCTIVE SLEEP APNEA): ICD-10-CM

## 2018-10-16 NOTE — PROGRESS NOTES
Patient was offered choice of vendor and chose [facility].  Biao He was set up at [place] on October 16, 2018 Patient received a Resmed AirSense 10 Auto. Pressures were set at 5-18 cm H2O.   Patient s ramp is 5 cm H2O for 5 min and FLEX/EPR is EPR, 2.  Patient received a Resmed Mask name: Mirage FX Nasal mask Size Standard, heated tubing and heated humidifier.  Patient did not tolerate pressure well.    Patient is enrolled in the [facility] Program and does not need to meet compliance. Patient has a follow up on 12/17/18 with Dr. Reagan.    STM Note: Patient wanted his modem disabled and does not want his data sent to [employer].  Let patient know that he will need to bring his SD card in for data downloads for appointments and that STM will only be able to ask how he is doing rather than being able to look at the data.  Patient understood this and still wanted his machine in airplane mode.  Liz Cervantes

## 2018-10-17 ENCOUNTER — THERAPY VISIT (OUTPATIENT)
Dept: PHYSICAL THERAPY | Facility: CLINIC | Age: 48
End: 2018-10-17
Attending: ORTHOPAEDIC SURGERY
Payer: COMMERCIAL

## 2018-10-17 DIAGNOSIS — S83.241A TEAR OF MEDIAL MENISCUS OF RIGHT KNEE, UNSPECIFIED TEAR TYPE, UNSPECIFIED WHETHER OLD OR CURRENT TEAR, INITIAL ENCOUNTER: ICD-10-CM

## 2018-10-17 PROCEDURE — 97161 PT EVAL LOW COMPLEX 20 MIN: CPT | Mod: GP | Performed by: PHYSICAL THERAPIST

## 2018-10-17 PROCEDURE — 97110 THERAPEUTIC EXERCISES: CPT | Mod: GP | Performed by: PHYSICAL THERAPIST

## 2018-10-17 ASSESSMENT — ACTIVITIES OF DAILY LIVING (ADL)
GIVING WAY, BUCKLING OR SHIFTING OF KNEE: I DO NOT HAVE THE SYMPTOM
WALK: ACTIVITY IS MINIMALLY DIFFICULT
AS_A_RESULT_OF_YOUR_KNEE_INJURY,_HOW_WOULD_YOU_RATE_YOUR_CURRENT_LEVEL_OF_DAILY_ACTIVITY?: NEARLY NORMAL
SIT WITH YOUR KNEE BENT: ACTIVITY IS MINIMALLY DIFFICULT
KNEE_ACTIVITY_OF_DAILY_LIVING_SCORE: 87.14
HOW_WOULD_YOU_RATE_THE_CURRENT_FUNCTION_OF_YOUR_KNEE_DURING_YOUR_USUAL_DAILY_ACTIVITIES_ON_A_SCALE_FROM_0_TO_100_WITH_100_BEING_YOUR_LEVEL_OF_KNEE_FUNCTION_PRIOR_TO_YOUR_INJURY_AND_0_BEING_THE_INABILITY_TO_PERFORM_ANY_OF_YOUR_USUAL_DAILY_ACTIVITIES?: 95
RISE FROM A CHAIR: ACTIVITY IS MINIMALLY DIFFICULT
LIMPING: THE SYMPTOM AFFECTS MY ACTIVITY SLIGHTLY
HOW_WOULD_YOU_RATE_THE_OVERALL_FUNCTION_OF_YOUR_KNEE_DURING_YOUR_USUAL_DAILY_ACTIVITIES?: NEARLY NORMAL
RAW_SCORE: 61
GO DOWN STAIRS: ACTIVITY IS NOT DIFFICULT
KNEEL ON THE FRONT OF YOUR KNEE: ACTIVITY IS NOT DIFFICULT
KNEE_ACTIVITY_OF_DAILY_LIVING_SUM: 61
PAIN: I DO NOT HAVE THE SYMPTOM
STIFFNESS: THE SYMPTOM AFFECTS MY ACTIVITY SLIGHTLY
STAND: ACTIVITY IS NOT DIFFICULT
GO UP STAIRS: ACTIVITY IS NOT DIFFICULT
WEAKNESS: THE SYMPTOM AFFECTS MY ACTIVITY SLIGHTLY
SWELLING: I DO NOT HAVE THE SYMPTOM
SQUAT: ACTIVITY IS NOT DIFFICULT

## 2018-10-17 NOTE — MR AVS SNAPSHOT
After Visit Summary   10/17/2018    Eloise Johnson    MRN: [id number]           Patient Information     Date Of Birth          1970        Visit Information        Provider Department      10/17/2018 7:00 AM Kimi Marino, PT [facility] Physical Therapy        Today's Diagnoses     Tear of medial meniscus of right knee, unspecified tear type, unspecified whether old or current tear, initial encounter           Follow-ups after your visit        Your next 10 appointments already scheduled     Oct 24, 2018  8:20 AM CDT   HANK Extremity with Lona Quinonez PTA   [facility] Physical Therapy ([facility]  )    [address]  [facility] [address]   [phone]            Oct 31, 2018  9:00 AM CDT   HANK Extremity with Lona Quinonez PTA   [facility] Physical Therapy ([facility]  )    [address]  [facility] [address]   [phone]            Dec 17, 2018  8:30 AM CST   Return Sleep Patient with Seamus Reagan MD   [facility] ([facility]    [address] [place] [id number] [phone]              Who to contact     If you have questions or need follow up information about today's clinic visit or your schedule please contact [facility] PHYSICAL THERAPY directly at [phone].  Normal or non-critical lab and imaging results will be communicated to you by MyChart, letter or phone within 4 business days after the clinic has received the results. If you do not hear from us within 7 days, please contact the clinic through MyChart or phone. If you have a critical or abnormal lab result, we will notify you by phone as soon as possible.  Submit refill requests through [employer] or call your pharmacy and they will forward the refill request to us. Please allow 3 business days for your refill to be completed.          Additional Information  About Your Visit        [employer] Information     [employer] gives you secure access to your electronic health record. If you see a primary care provider, you can also send messages to your care team and make appointments. If you have questions, please call your primary care clinic.  If you do not have a primary care provider, please call [phone] and they will assist you.        Care EveryWhere ID     This is your Care EveryWhere ID. This could be used by other organizations to access your [place] medical records  [id number]         Blood Pressure from Last 3 Encounters:   10/11/18 (!) 134/92   09/26/18 (!) 134/93   08/23/18 (!) 147/102    Weight from Last 3 Encounters:   10/11/18 88.5 kg (195 lb)   09/26/18 88.9 kg (196 lb)   08/23/18 89.8 kg (198 lb)              We Performed the Following     HC PT EVAL, LOW COMPLEXITY     HANK INITIAL EVAL REPORT     PHYSICAL THERAPY REFERRAL (Internal)     THERAPEUTIC EXERCISES        Primary Care Provider Office Phone # Fax #    Fabian Crowder [phone] [address] [facility] [address]        Equal Access to Services     [place] AH: Hadii aad ku hadasho Soomaali, waaxda luqadaha, qaybta kaalmada adeegyada, raphael izquierdo hayperlan emy whitt. So [facility] [phone].    ATENCIÓN: Si habla español, tiene a laughlin disposición servicios gratuitos de asistencia lingüística. Llame al [phone].    We comply with applicable federal civil rights laws and [place] laws. We do not discriminate on the basis of race, color, national origin, age, disability, sex, sexual orientation, or gender identity.            Thank you!     Thank you for choosing [facility] PHYSICAL THERAPY  for your care. Our goal is always to provide you with excellent care. Hearing back from our patients is one way we can continue to improve our services. Please take a few minutes to complete the written survey that you may receive in the mail after  your visit with us. Thank you!             Your Updated Medication List - Protect others around you: Learn how to safely use, store and throw away your medicines at www.disposemymeds.org.          This list is accurate as of 10/17/18 11:28 AM.  Always use your most recent med list.                   Brand Name Dispense Instructions for use Diagnosis    aspirin 81 MG EC tablet      Take 1 tablet (81 mg) by mouth every other day    Hyperlipidemia LDL goal < 160       atorvastatin 10 MG tablet    LIPITOR    90 tablet    Take 1 tablet (10 mg) by mouth daily    Mixed hyperlipidemia       Multi-vitamin Tabs tablet   Generic drug:  multivitamin, therapeutic with minerals      Take 1 tablet by mouth daily.        omega-3 fatty acids 1200 MG capsule      Take 2 capsules by mouth daily.        order for DME     1 Device    autoCPAP 5-18 cmH20    HUEY (obstructive sleep apnea)       vitamin D 1000 units capsule      Take 1 capsule by mouth daily

## 2018-10-17 NOTE — PROGRESS NOTES
[facility] Initial Evaluation  Subjective:  Patient is a 48 year old male presenting with rehab right knee hpi. The history is provided by the patient. A  was used.   Eloise He is a 48 year old male with a right knee condition.  Condition occurred with:  Insidious onset.  Condition occurred: for unknown reasons.  This is a new condition  Right knee pain started a few months ago, noticed it after sitting in the car for a longer drive. Recently followed up with MD and MRI was completed.  Would like to trial PT at this time..    Patient reports pain:  Medial.    Pain is described as other and aching (stiffness)  and reported as 3/10.  Associated symptoms:  Loss of motion/stiffness. Pain is the same all the time.  Symptoms are exacerbated by sitting and relieved by activity/movement.  Since onset symptoms are unchanged.  Special tests:  MRI.      General health as reported by patient is good.  Pertinent medical history includes:  High blood pressure and sleep disorder/apnea.  Medical allergies: no.    Current medications:  Other (multivitamin).                                      Objective:  System                                           Hip Evaluation    Hip Strength:        Abduction:  Left: 5/5     Pain:Right: 4/5    Pain:                           Knee Evaluation:  ROM:    AROM    Hyperextension:  Left:  15    Right: 8    Flexion: Left: 140    Right: 130        Strength:     Extension:  Left: 5/5   Pain:      Right: 4-/5   Pain:  Flexion:  Left: 5/5   Pain:      Right: 4/5   Pain:          Special Tests:     Right knee positive for the following tests:  Meniscal  Palpation:      Right knee tenderness present at:  Medial Joint Line            General     ROS    Assessment/Plan:    Patient is a 48 year old male with right side knee complaints.    Patient has the following significant findings with corresponding treatment plan.                Diagnosis 1:  Right knee pain  Pain  -  hot/cold therapy, manual therapy, self management, education and directional preference exercise  Decreased ROM/flexibility - manual therapy and therapeutic exercise  Decreased strength - therapeutic exercise and therapeutic activities  Impaired muscle performance - neuro re-education  Decreased function - therapeutic activities    Therapy Evaluation Codes:   1) History comprised of:   Personal factors that impact the plan of care:      None.    Comorbidity factors that impact the plan of care are:      None.     Medications impacting care: None.  2) Examination of Body Systems comprised of:   Body structures and functions that impact the plan of care:      Knee.   Activity limitations that impact the plan of care are:      Sitting.  3) Clinical presentation characteristics are:   Stable/Uncomplicated.  4) Decision-Making    Low complexity using standardized patient assessment instrument and/or measureable assessment of functional outcome.  Cumulative Therapy Evaluation is: Low complexity.    Previous and current functional limitations:  (See Goal Flow Sheet for this information)    Short term and Long term goals: (See Goal Flow Sheet for this information)     Communication ability:  Patient appears to be able to clearly communicate and understand verbal and written communication and follow directions correctly.  Treatment Explanation - The following has been discussed with the patient:   RX ordered/plan of care  Anticipated outcomes  Possible risks and side effects  This patient would benefit from PT intervention to resume normal activities.   Rehab potential is good.    Frequency:  1 X week, once daily  Duration:  for 8 weeks  Discharge Plan:  Achieve all LTG.  Independent in home treatment program.  Reach maximal therapeutic benefit.    Please refer to the daily flowsheet for treatment today, total treatment time and time spent performing 1:1 timed codes.

## 2018-10-19 ENCOUNTER — DOCUMENTATION ONLY (OUTPATIENT)
Dept: SLEEP MEDICINE | Facility: CLINIC | Age: 48
End: 2018-10-19

## 2018-10-19 DIAGNOSIS — G47.33 OSA (OBSTRUCTIVE SLEEP APNEA): ICD-10-CM

## 2018-10-19 NOTE — PROGRESS NOTES
3 DAY STM VISIT    Diagnostic AHI: 21.6 PSG    Patient contacted for 3 day STM visit  Subjective measures:  Pt states that he's been busy and hasn't started using yet.  Subjective measures only because pt doesn't want his modem on.     Device type: Auto-CPAP  PAP settings from order::  CPAP min 5 cm  H20       CPAP max 18 cm  H20  Mask type:    Nasal Mask     Assessment: No data available. Pt's states he doesn't want his modem on so his machine is in airplane mode  Action plan: Pt to have f/u 14 day STM visit.  Patient has a follow up visit scheduled:   yes within 61-90 days of set up.

## 2018-10-31 ENCOUNTER — DOCUMENTATION ONLY (OUTPATIENT)
Dept: SLEEP MEDICINE | Facility: CLINIC | Age: 48
End: 2018-10-31

## 2018-10-31 DIAGNOSIS — G47.33 OSA (OBSTRUCTIVE SLEEP APNEA): ICD-10-CM

## 2018-10-31 NOTE — PROGRESS NOTES
14 DAY STM VISIT    Diagnostic AHI: 21.6  PSG    Message left for patient to return call     Assessment: Subjective measures only  Action plan: waiting for patient to return call.  and pt to have 30 day STM visit.   Device type: Auto-CPAP  PAP settings: CPAP min 5 cm  H20     CPAP max 18 cm  H20    Mask type:   Nasal Mask     Objective measures: No data available. Pt's states he doesn't want his modem on so his machine is in airplane mode

## 2018-11-09 ENCOUNTER — THERAPY VISIT (OUTPATIENT)
Dept: PHYSICAL THERAPY | Facility: CLINIC | Age: 48
End: 2018-11-09
Payer: COMMERCIAL

## 2018-11-09 DIAGNOSIS — S83.241A ACUTE MEDIAL MENISCUS TEAR OF RIGHT KNEE: Primary | ICD-10-CM

## 2018-11-09 PROCEDURE — 97110 THERAPEUTIC EXERCISES: CPT | Mod: GP | Performed by: PHYSICAL THERAPY ASSISTANT

## 2018-11-09 PROCEDURE — 97140 MANUAL THERAPY 1/> REGIONS: CPT | Mod: GP | Performed by: PHYSICAL THERAPY ASSISTANT

## 2018-11-13 ENCOUNTER — DOCUMENTATION ONLY (OUTPATIENT)
Dept: SLEEP MEDICINE | Facility: CLINIC | Age: 48
End: 2018-11-13

## 2018-11-13 NOTE — PROGRESS NOTES
Patient called today and left message stating that his machine is not working properly and would like a call back. I called him back at the number he left and he did not answer. Left message with my direct number for him to call back. We are unable to look at his download, as his modem is in airplane mode per his request.     Mariluz Guadalupe on 11/13/2018 at 9:28 AM  [facility].   [phone]

## 2018-11-16 ENCOUNTER — DOCUMENTATION ONLY (OUTPATIENT)
Dept: SLEEP MEDICINE | Facility: CLINIC | Age: 48
End: 2018-11-16

## 2018-11-16 ENCOUNTER — TELEPHONE (OUTPATIENT)
Dept: SLEEP MEDICINE | Facility: CLINIC | Age: 48
End: 2018-11-16

## 2018-11-16 DIAGNOSIS — G47.33 OSA (OBSTRUCTIVE SLEEP APNEA): ICD-10-CM

## 2018-11-16 NOTE — TELEPHONE ENCOUNTER
Called patient back to discuss his concerns. Patient states he naps well with pap, but has difficulty at night. Patient mentioned that he feels like he can't exhale at night. He started stating something about how his mouth opens at night, but doesn't open during the day with naps. I started suggesting leak, and how he could need a chinstrap and/or move on to a full face mask. Patient stated that's not it. Informed patient that it's difficult for me to pinpoint what his problem could be as we cannot access his download due to him turning airplane mode on. I informed patient that he could have difficulty exhaling due to requiring higher pressures at night as his rx is for 5-18, and will autoadjust. Patient expressed understanding. Patient stated he feels like the air is cold, and I tried to walk him through on how to adjust his humidity level and tube temperature, but patient would like to come in as he feels it would be better. Patient prefers BK sleep with Liz. Scheduled for 12/3/18 at 1 pm at  Sleep. Patient asked if he should stop using pap until his appointment. I asked patient to try to continue to use his pap and turn his airplane mode off, so we can monitor and get an idea of what's going on. Patient agreed to do so. Will follow up with patient on Monday.

## 2018-11-16 NOTE — PROGRESS NOTES
30 DAY [facility] VISIT    Diagnostic AHI: 21.6 PSG    Message left for patient to return call     Assessment: Subjective measures only   Action plan: waiting for patient to return call.   Patient has scheduled a follow up visit with Dr. Reagan on 12/17/18.   Device type: Auto-CPAP  Mask type:  Nasal Mask  Objective measures: Pt does not want modem on

## 2018-11-21 ENCOUNTER — TELEPHONE (OUTPATIENT)
Dept: SLEEP MEDICINE | Facility: CLINIC | Age: 48
End: 2018-11-21

## 2018-11-21 DIAGNOSIS — G47.33 OSA (OBSTRUCTIVE SLEEP APNEA): ICD-10-CM

## 2018-11-21 NOTE — TELEPHONE ENCOUNTER
Called patient and apologized that I was unable to get back to him on Monday as I was out sick. Patient stated that it was fine. I reviewed patient's download and he only used it on 11/11/18 and 11/12/18. I asked patient if he's used it any other time, and patient stated no as he feels uncomfortable with the pressures and mask. Patient stated that it'd be easier to explain his difficulties in person with Liz on 12/3/18. Instructed patient to give us a call if he has any further questions or concerns.

## 2018-11-30 ENCOUNTER — THERAPY VISIT (OUTPATIENT)
Dept: PHYSICAL THERAPY | Facility: CLINIC | Age: 48
End: 2018-11-30
Payer: COMMERCIAL

## 2018-11-30 DIAGNOSIS — S83.241A ACUTE MEDIAL MENISCUS TEAR OF RIGHT KNEE: ICD-10-CM

## 2018-11-30 PROCEDURE — 97110 THERAPEUTIC EXERCISES: CPT | Mod: GP | Performed by: PHYSICAL THERAPIST

## 2018-11-30 PROCEDURE — 97140 MANUAL THERAPY 1/> REGIONS: CPT | Mod: GP | Performed by: PHYSICAL THERAPIST

## 2018-12-03 ENCOUNTER — DOCUMENTATION ONLY (OUTPATIENT)
Dept: SLEEP MEDICINE | Facility: CLINIC | Age: 48
End: 2018-12-03

## 2018-12-03 DIAGNOSIS — G47.33 OSA (OBSTRUCTIVE SLEEP APNEA): ICD-10-CM

## 2018-12-03 NOTE — PROGRESS NOTES
Patient came to [facility] on December 3, 2018 for a PAP troubleshoot and reeducation.  Checked patient's machine with manometer; pressure is blowing correctly.  Patient stated that he sometimes feels like it's not enough pressure.  Reminded patient to keep his mouth closed and only breathe through his nose.  Patient declined chin strap or Full Face mask.  Also showed patient how to Run Warmup on machine because he said the air feels too cold.  Let patient know that he would need to see his sleep doctor if he thinks the pressure should be increased.  Patient is going to try again using Warmup feature.

## 2018-12-14 ENCOUNTER — OFFICE VISIT (OUTPATIENT)
Dept: OPTOMETRY | Facility: CLINIC | Age: 48
End: 2018-12-14
Payer: COMMERCIAL

## 2018-12-14 DIAGNOSIS — H04.123 DRY EYE SYNDROME OF BOTH EYES: Primary | ICD-10-CM

## 2018-12-14 DIAGNOSIS — H01.119 EYELID DERMATITIS, ALLERGIC/CONTACT: ICD-10-CM

## 2018-12-14 DIAGNOSIS — H52.13 HIGH MYOPIA, BOTH EYES: ICD-10-CM

## 2018-12-14 PROCEDURE — 99213 OFFICE O/P EST LOW 20 MIN: CPT | Performed by: OPTOMETRIST

## 2018-12-14 RX ORDER — COLD-HOT PACK
1 EACH MISCELLANEOUS 2 TIMES DAILY
Qty: 14 EACH | Refills: 3 | Status: SHIPPED | OUTPATIENT
Start: 2018-12-14 | End: 2018-12-21

## 2018-12-14 ASSESSMENT — REFRACTION_WEARINGRX
OS_SPHERE: -10.50
SPECS_TYPE: AUTO/PAL
OD_AXIS: 085
OS_CYLINDER: +1.25
OS_CYLINDER: +0.50
OS_SPHERE: -10.50
OD_SPHERE: -11.25
OD_AXIS: 085
OD_ADD: +1.75
SPECS_TYPE: WEARING
OD_SPHERE: -11.25
OD_CYLINDER: +1.50
OS_AXIS: 100
OS_ADD: +1.75
OS_CYLINDER: +0.75
OD_AXIS: 080
OS_SPHERE: -9.50
OD_SPHERE: -10.50
OD_AXIS: 082
OS_AXIS: 098
OS_ADD: +1.75
OD_CYLINDER: +2.00
OD_ADD: +1.25
OS_AXIS: 100
OS_AXIS: 100
OS_SPHERE: -10.25
OD_CYLINDER: +0.75
OD_CYLINDER: +1.25
OD_SPHERE: -10.75
OS_CYLINDER: +1.25
OS_ADD: +1.25
OD_ADD: +1.75

## 2018-12-14 ASSESSMENT — REFRACTION_MANIFEST
OS_AXIS: 100
OS_SPHERE: -10.50
OD_ADD: +1.75
OS_CYLINDER: +1.25
OD_AXIS: 080
OD_SPHERE: -10.75
OS_ADD: +1.75
OD_CYLINDER: +1.00

## 2018-12-14 ASSESSMENT — EXTERNAL EXAM - LEFT EYE: OS_EXAM: NORMAL

## 2018-12-14 ASSESSMENT — VISUAL ACUITY
METHOD: SNELLEN - LINEAR
CORRECTION_TYPE: GLASSES
OD_CC: 20/20
OS_CC: 20/25
OD_CC+: -1
OS_CC+: -2

## 2018-12-14 ASSESSMENT — EXTERNAL EXAM - RIGHT EYE: OD_EXAM: NORMAL

## 2018-12-14 NOTE — PATIENT INSTRUCTIONS
Systane Balance- 1 drop both eyes 4 x day.    Maxitrol ointment to eyelids 3 x day for 1 week.    Cold compresses 2 x day.    Eyeglass prescription given.    Return 1/2019 for comprehensive eye exam.    Ar Medina, OD

## 2018-12-14 NOTE — PROGRESS NOTES
Chief Complaint   Patient presents with     Dry Eye(s)       See HPI section    Medical, surgical and family histories reviewed and updated 12/14/2018.       OBJECTIVE: See Ophthalmology exam    ASSESSMENT:    ICD-10-CM    1. Dry eye syndrome of both eyes H04.123    2. Eyelid dermatitis, allergic/contact H01.119 tobramycin-dexamethasone (TOBRADEX) 0.3-0.1 % ophthalmic ointment     Hot/Cold Therapy Aids (ICE & HEAT WRAP/GEL PACK) PADS   3. High myopia, both eyes H52.13       PLAN:     Patient Instructions   Systane Balance- 1 drop both eyes 4 x day.    Maxitrol ointment to eyelids 3 x day for 1 week.    Cold compresses 2 x day.    Eyeglass prescription given.    Return 1/2019 for comprehensive eye exam.    Ar Medina, OD

## 2018-12-17 ENCOUNTER — OFFICE VISIT (OUTPATIENT)
Dept: SLEEP MEDICINE | Facility: CLINIC | Age: 48
End: 2018-12-17
Payer: COMMERCIAL

## 2018-12-17 VITALS
HEART RATE: 77 BPM | WEIGHT: 190 LBS | DIASTOLIC BLOOD PRESSURE: 94 MMHG | BODY MASS INDEX: 28.14 KG/M2 | HEIGHT: 69 IN | OXYGEN SATURATION: 97 % | SYSTOLIC BLOOD PRESSURE: 137 MMHG

## 2018-12-17 DIAGNOSIS — G47.33 OSA (OBSTRUCTIVE SLEEP APNEA): ICD-10-CM

## 2018-12-17 PROCEDURE — 99214 OFFICE O/P EST MOD 30 MIN: CPT | Performed by: INTERNAL MEDICINE

## 2018-12-17 ASSESSMENT — MIFFLIN-ST. JEOR: SCORE: 1717.21

## 2018-12-17 NOTE — NURSING NOTE
"Chief Complaint   Patient presents with     CPAP Follow Up     having issues with machine.        Initial BP (!) 137/94   Pulse 77   Ht 1.753 m (5' 9\")   Wt 86.2 kg (190 lb)   SpO2 97%   BMI 28.06 kg/m   Estimated body mass index is 28.06 kg/m  as calculated from the following:    Height as of this encounter: 1.753 m (5' 9\").    Weight as of this encounter: 86.2 kg (190 lb).    Medication Reconciliation: complete        "

## 2018-12-17 NOTE — PROGRESS NOTES
"Obstructive Sleep Apnea - PAP Follow-Up Visit:    Chief Complaint   Patient presents with     CPAP Follow Up     having issues with machine.        Eloise He comes in today for follow-up of their moderate sleep apnea, managed with CPAP.     Initial sleep study done at the [facility] for unrefreshing sleep (ESS 12), loud snoring,  ancestry, comorbid cardiomyopathy.      Study Date: 9/4/2018 (198.0 lb)    - Apnea/hypopnea index was 21.6 events per hour (central apnea/hypopnea index was 0.6 events per hour). The REM AHI was 45.1 events per hour. The supine AHI was 38.1 events per hour. RDI was 33.7 events per hour.   - Lowest oxygen saturation was 69.5%. Time spent less than or equal to 88% was 14.5 minutes. Time spent less than or equal to 89% was 17.3 minutes.  -The PLM index was 17.4 movements per hour. The PLM Arousal Index was 1.5 per hour    Elected treatment with autoPAP    Overall, he rates the experience with PAP as 1 (0 poor, 10 great). The mask is comfortable.    The mask is not leaking .  He is not snoring with the mask on. He is not having gasp arousals.  He is having significant oral/nasal dryness. The pressure is not comfortable. The pressure is uncomfortable because of \"can't breathe normally uncomforable \".    His PAP interface is Nasal Mask.    Bedtime is typically 2200. Usually it takes about 5 min minutes to fall asleep with the mask on. Wake time is typically 0600.  Patient is using PAP therapy 2 hours per night. The patient is usually getting 8 hours of sleep per night.    He does not feel rested in the morning.    Total score - [place]: 14 (12/17/2018  8:00 AM)  ANKIT Total Score: 3      ResMed   Auto-PAP 5-18 cm download:  Average use 0 hours 38 minutes/day.  2 total days of use.  28 nonuse days. 0 days with >4 hours use.  Median Leak 4 L/min. 95%ile Leak 5 L/min. CPAP 90% pressure 6.8cm. AHI 1.8         Past medical/surgical history, family history, social history, " "medications and allergies were reviewed.      Problem List:  Patient Active Problem List    Diagnosis Date Noted     Acute medial meniscus tear of right knee 11/09/2018     Priority: Medium     HUEY (obstructive sleep apnea)- moderate (AHI 21) 09/06/2018     Priority: Medium     9/4/2018 [facility] Study (198.0 lbs) - AHI 21.6, RDI 33.7, Supine AHI 38.1, REM AHI 45.1, Low O2 69.5%, Time Spent ?88% 14.5 minutes / Time Spent ?89% 17.3 minutes.       Cardiomyopathy of undetermined type (H)      Priority: Medium     Idiopathic nonischemic cardiomyopathy. Stress echocardiography 2015 showed an EF of 40%-45% at baseline, which improved with stress to 55%-60%. Stress MRI/MRA 2015 with normal perfusion at rest and post-stress without evidence of inducible ischemia. Normal left ventricular size and mildly reduced systolic functionwith a calculated ejection fraction of  48 %.  Normal right ventricular size and systolic function with a calculated ejection fraction of 50%. On delayed enhancement imaging, there is no abnormal hyperenhancement to suggest myocardial scar/inflammation/infiltration. Collectively, these findings are consistent with an idiopathic nonischemic cardiomyopathy.       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 Not Tolerating PAP well. Awakens and takes off PAP due to pressure sensation after short period, but max pressure is 6.8 on two 60 minute nights in last 30 days  Treatment options reviewed  Recommend PAP nap for initial comfort settings  Consider all night titration.     Eloise He will follow " up in about 2 month(s).     Fifteen minutes spent with patient, all of which were spent face-to-face counseling, consulting, coordinating plan of care.

## 2018-12-17 NOTE — PATIENT INSTRUCTIONS
Your BMI is Body mass index is 28.06 kg/m .  Weight management is a personal decision.  If you are interested in exploring weight loss strategies, the following discussion covers the approaches that may be successful. Body mass index (BMI) is one way to tell whether you are at a healthy weight, overweight, or obese. It measures your weight in relation to your height.  A BMI of 18.5 to 24.9 is in the healthy range. A person with a BMI of 25 to 29.9 is considered overweight, and someone with a BMI of 30 or greater is considered obese. More than two-thirds of American adults are considered overweight or obese.  Being overweight or obese increases the risk for further weight gain. Excess weight may lead to heart disease and diabetes.  Creating and following plans for healthy eating and physical activity may help you improve your health.  Weight control is part of healthy lifestyle and includes exercise, emotional health, and healthy eating habits. Careful eating habits lifelong are the mainstay of weight control. Though there are significant health benefits from weight loss, long-term weight loss with diet alone may be very difficult to achieve- studies show long-term success with dietary management in less than 10% of people. Attaining a healthy weight may be especially difficult to achieve in those with severe obesity. In some cases, medications, devices and surgical management might be considered.  What can you do?  If you are overweight or obese and are interested in methods for weight loss, you should discuss this with your provider.     Consider reducing daily calorie intake by 500 calories.     Keep a food journal.     Avoiding skipping meals, consider cutting portions instead.    Diet combined with exercise helps maintain muscle while optimizing fat loss. Strength training is particularly important for building and maintaining muscle mass. Exercise helps reduce stress, increase energy, and improves fitness.  Increasing exercise without diet control, however, may not burn enough calories to loose weight.       Start walking three days a week 10-20 minutes at a time    Work towards walking thirty minutes five days a week     Eventually, increase the speed of your walking for 1-2 minutes at time    In addition, we recommend that you review healthy lifestyles and methods for weight loss available through the National Institutes of Health patient information sites:  http://win.niddk.nih.gov/publications/index.htm    And look into health and wellness programs that may be available through your health insurance provider, employer, local community center, or [employer].    Weight management plan: Patient was referred to their PCP to discuss a diet and exercise plan.

## 2018-12-21 ENCOUNTER — OFFICE VISIT (OUTPATIENT)
Dept: SLEEP MEDICINE | Facility: CLINIC | Age: 48
End: 2018-12-21

## 2018-12-21 DIAGNOSIS — G47.33 OSA (OBSTRUCTIVE SLEEP APNEA): Primary | ICD-10-CM

## 2018-12-21 DIAGNOSIS — R09.81 NASAL CONGESTION: ICD-10-CM

## 2018-12-21 RX ORDER — MOMETASONE FUROATE MONOHYDRATE 50 UG/1
2 SPRAY, METERED NASAL DAILY
Qty: 1 G | Refills: 0 | Status: SHIPPED | OUTPATIENT
Start: 2018-12-21 | End: 2019-07-24

## 2018-12-21 NOTE — PROGRESS NOTES
We can probably leave the pressure settings alone unless he wants to change them to 10-18 cmh20 or fix at 10 cm for now.     Recommend we work on the nasal symptoms  Have him try benadyrl/diphenhydramine 25-50 mg (over-the-counter) 1 hour before bedtime  If that doesn't help try nasonex 1-2 sprays each nostril daily. [facility]. This medication will take 1-2 weeks to start working.    If meds don't help [place] recommend he be seen by Dr. Schmidt, ENT, consult order placed.

## 2018-12-21 NOTE — PROGRESS NOTES
Pap nap started at 4cmH2O and increase slowly to [id number].  Overall he did well up to a pressure of 43aiV3J.  I turn Cflex on to see if he liked that setting.  He did not care for Cflex.  I also tried bi-level to see if these settings were comfortable Bi-level was started at 10/6cmH2O.  He did not like bi-level.  So we went back to CPAP at a pressure of [id number].  I then increase over a little bit of time to a pressure of 29xfC7V.  He felt this pressure was too high.  [id number] was the CPAP pressure he felt most comfortable at.  Heated humidity was set at 3 for all the above pressures.  He became congested and the humidity was decreased to 1.  Congestion continued and he was switched to a Airfit F30 full face mask.  He was willing to try the full face mask but felt he d like his nasal mask better.  He wanted to take the mask off.  He mentioned that he was feeling the way he feels when he's at home and takes the mask off.  I believe that when he becomes congested and can t breathe through his nose is when he may be taking the mask off at home.  Also when he became congested a pressure of 20jmJ9I began to bother him.  I decreased to 9cmH2O and then to 8cmH2O and these still bothered him with the congestion. Pap Nap lasted about 2 hours.  He was wondering if he would be contacted about if his pressure needs to be changed.      REGULO RaglandGT

## 2018-12-21 NOTE — Clinical Note
Pap nap started at 4cmH2O and increase slowly to 82xpC8Z.  Overall he did well up to a pressure of 27uwK0U.  I turn Cflex on to see if he liked that setting.  He did not care for Cflex.  I also tried bi-level to see if these settings were comfortable Bi-level was started at 10/6cmH2O.  He did not like bi-level.  So we went back to CPAP at a pressure of 99vsU8J.  See nursing notes for more details.

## 2018-12-21 NOTE — PROGRESS NOTES
Called and LVM for c/b to go over message from Dr. Reagan.      Carla Sequeira MA on 12/21/2018 at 3:43 PM

## 2018-12-26 ENCOUNTER — THERAPY VISIT (OUTPATIENT)
Dept: PHYSICAL THERAPY | Facility: CLINIC | Age: 48
End: 2018-12-26
Payer: COMMERCIAL

## 2018-12-26 DIAGNOSIS — S83.241A ACUTE MEDIAL MENISCUS TEAR OF RIGHT KNEE: ICD-10-CM

## 2018-12-26 PROCEDURE — 97112 NEUROMUSCULAR REEDUCATION: CPT | Mod: GP | Performed by: PHYSICAL THERAPIST

## 2018-12-26 PROCEDURE — 97110 THERAPEUTIC EXERCISES: CPT | Mod: GP | Performed by: PHYSICAL THERAPIST

## 2019-01-10 ENCOUNTER — OFFICE VISIT (OUTPATIENT)
Dept: DERMATOLOGY | Facility: CLINIC | Age: 49
End: 2019-01-10
Payer: COMMERCIAL

## 2019-01-10 DIAGNOSIS — L30.8 OTHER ECZEMA: Primary | ICD-10-CM

## 2019-01-10 PROCEDURE — 99202 OFFICE O/P NEW SF 15 MIN: CPT | Performed by: DERMATOLOGY

## 2019-01-10 ASSESSMENT — PAIN SCALES - GENERAL: PAINLEVEL: NO PAIN (0)

## 2019-01-10 NOTE — PROGRESS NOTES
"[facility] Dermatology Note      Dermatology Problem List:  1. Hx of wart right posterior upper arm, s/p biopsy 10/11/2018  2. Hx of eyelid dermatitis:  -Previously seen at [facility]: treated with Alcometazone cream, Westcort ointment, protopic ointment --- all lost efficacy  -Concerned for ACD, recommended biopsy, patient to consider  3. Vitiligo: hx of eczimer laser, topical steroids, and protopic 0.1% ointment    Encounter Date: Chris 10, 2019    CC:  Chief Complaint   Patient presents with     Rash     Rash on face, pt states has been going on for 1-2 years, and it comes in cycles. Pt has used protopic and cerave creams, also neomycin and pollymyxin b sulfates and dexamethasone ointment for eye lids. No personal or family hx of SC.          History of Present Illness:  Mr. Navas \"Leobardo\" He is a 48 year old male who presents in self referral for a facial rash. He has previously been seen at [facility]. He has a rash on his face that has been going on for 'years'. He says the rash turns to Vitiligo if he does not use his protopic cream. He states that it starts as a pink scale, then leaves behind a white spot. Recently, in the last 1-2 years it has been more scaly and itchy. He is having more flares. His eyelids have scale and are very itchy. He has tried using protopic (no relief), CeraVe cream, Vaseline, and then a combination product from his eye doctor containing neomycin, polymyxin, and dexamethasone. He washes his face with warm water. Sometimes he uses a body wash and shampoo combined on the face (thinks Celeste). He does not use the soap at the gym. Denies any other products applied to the face. No personal or family history of skin cancer. No other concerns addressed today.      Past Medical History:   Patient Active Problem List   Diagnosis     Mixed hyperlipidemia     Chronic fatigue     Cough     High myopia, both eyes     Family history of early CAD     HDL " deficiency     Cardiomyopathy of undetermined type (H)     HUEY (obstructive sleep apnea)- moderate (AHI 21)     Acute medial meniscus tear of right knee     Nasal congestion     Past Medical History:   Diagnosis Date     Anxiety 05/01/2017    resolved 2018     Psychophysiological insomnia 05/01/2017    resolved 2018     Past Surgical History:   Procedure Laterality Date     COLONOSCOPY WITH CO2 INSUFFLATION N/A 10/24/2014    Procedure: COLONOSCOPY WITH CO2 INSUFFLATION;  Surgeon: Duane, William Charles, MD;  Location: [facility] OR     HC TOOTH EXTRACTION W/FORCEP         Social History:  Patient reports that he has quit smoking. His smoking use included cigarettes. He has a 2.00 pack-year smoking history. he has never used smokeless tobacco. He reports that he drinks about 0.5 - 1.0 oz of alcohol per week. He reports that he does not use drugs. patient is self employed.  to Dr. Plaza.       Family History:  Family History   Problem Relation Age of Onset     C.A.D. Father      Coronary Artery Disease Father      Eye Surgery Mother      Diabetes Maternal Grandmother      Colon Cancer No family hx of      Glaucoma No family hx of      Macular Degeneration No family hx of        Medications:  Current Outpatient Medications   Medication Sig Dispense Refill     aspirin EC 81 MG tablet Take 1 tablet (81 mg) by mouth every other day       atorvastatin (LIPITOR) 10 MG tablet Take 1 tablet (10 mg) by mouth daily 90 tablet 3     Cholecalciferol (VITAMIN D) 1000 UNITS capsule Take 1 capsule by mouth daily       mometasone (NASONEX) 50 MCG/ACT nasal spray Spray 2 sprays into both nostrils daily 1 g 0     Multiple Vitamin (MULTI-VITAMIN) per tablet Take 1 tablet by mouth daily.       omega-3 fatty acids 1200 MG capsule Take 2 capsules by mouth daily.       order for DME autoCPAP 5-18 cmH20 1 Device 0     tobramycin-dexamethasone (TOBRADEX) 0.3-0.1 % ophthalmic ointment Apply to eyelids 3 x day for 1 week. 1 Tube 0       No Known  Allergies    Review of Systems:  -Constitutional: Patient is otherwise feeling well, in usual state of health.   -Skin: As above in HPI. No additional skin concerns.    Physical exam:  Vitals: There were no vitals taken for this visit.  GEN: This is a well developed, well-nourished male in no acute distress, in a pleasant mood.    SKIN: Sun-exposed skin, which includes the head/face, neck, both arms, digits, and/or nails was examined.   - Scalp clear.  - Eczematous plaques on the preauricular cheeks extending into extremal ears mostly involving in the conchal bowl.  - Lateral canthus extending up to the upper eyelids, there are well defined, lichenified pink eczematous plaques, also scattered in the eye brows and nasolabial folds bilaterally  - Depigmented patches that fluoresce with woods lamp inferior aspect of the lip, nasal side wall, nasolabial folds  - No other lesions of concern on areas examined.       Impression/Plan:  1. Eczematous facial eruption. The quality and distribution of the rash is very suspicious for allergic reaction. Typically, I will treat eczematous appearing rashes with topical steroids and calcineurin inhibitors to see if I can clear. If no clearance, I do worry more about an allergic contact dermatitis driving continued rash. Since patient has already failed both treatments, I do think allergic contact dermatitis is the most likely diagnosis. Consider diagnosis of severe seborrheic dermatitis or psoriasis given external ear involvement, but the quality of the rash is most fitting with ACD. At this time I recommended a skin biopsy to look to see what features predominate. While skin biopsies of rashes on the face are not often diagnostic of one condition, they can be very helpful with determining line of treatment. If spongiotic dermatitis with eosinophils is seen, I think having the patient switch to Vanicream/Free and Clear product line and undergoing patch testing would be best.  Discussed risks and benefits of biopsy in detail. After extensive discussion, patient is unsure, and very hesitant and would like to discuss with his wife (Dr. Plaza) prior to pursuing a biopsy.     Hold neomycin, polymyxin, and dexamthasone ointment as the antibiotics in this are common causes of allergic contact dermatitis.    Continue protopic 0.1% ointment BID for facial rash for now.     I will follow up with patients wife to discuss the biopsy procedure.     If wife and patient agreeable, will proceed with biopsy next week.     2. Vitiligo: Long standing per the patient.     Will readdress when facial rash     Follow up pending biopsy results.       Staff Involved:  Scribe/Staff    Scribe Disclosure  I, Rach Stapleton, am serving as a scribe to document services personally performed by Dr. Lo Velázquez MD, based on data collection and the provider's statements to me.     Provider Disclosure:   The documentation recorded by the scribe accurately reflects the services I personally performed and the decisions made by me.    Lo Velázquez MD    Department of Dermatology  [facility]: Phone: [phone], Fax:[phone]  [facility]: Phone: [phone], Fax: [phone]

## 2019-01-10 NOTE — LETTER
"    1/10/2019         RE: Eloise Johnson  [address] [address]        Dear Colleague,    Thank you for referring your patient, Eloise Johnson, to the [facility]. Please see a copy of my visit note below.    [facility] Dermatology Note      Dermatology Problem List:  1. Hx of wart right posterior upper arm, s/p biopsy 10/11/2018  2. Hx of eyelid dermatitis:  -Previously seen at [facility]: treated with Alcometazone cream, Westcort ointment, protopic ointment --- all lost efficacy  -Concerned for ACD, recommended biopsy, patient to consider  3. Vitiligo: hx of eczimer laser, topical steroids, and protopic 0.1% ointment    Encounter Date: Chris 10, 2019    CC:  Chief Complaint   Patient presents with     Rash     Rash on face, pt states has been going on for 1-2 years, and it comes in cycles. Pt has used protopic and cerave creams, also neomycin and pollymyxin b sulfates and dexamethasone ointment for eye lids. No personal or family hx of SC.          History of Present Illness:  Mr. Navas \"Leobardo\" He is a 48 year old male who presents in self referral for a facial rash. He has previously been seen at [facility]. He has a rash on his face that has been going on for 'years'. He says the rash turns to Vitiligo if he does not use his protopic cream. He states that it starts as a pink scale, then leaves behind a white spot. Recently, in the last 1-2 years it has been more scaly and itchy. He is having more flares. His eyelids have scale and are very itchy. He has tried using protopic (no relief), CeraVe cream, Vaseline, and then a combination product from his eye doctor containing neomycin, polymyxin, and dexamethasone. He washes his face with warm water. Sometimes he uses a body wash and shampoo combined on the face (thinks Celeste). He does not use the soap at the gym. Denies any other products applied to the face. No personal or family history of skin " cancer. No other concerns addressed today.      Past Medical History:   Patient Active Problem List   Diagnosis     Mixed hyperlipidemia     Chronic fatigue     Cough     High myopia, both eyes     Family history of early CAD     HDL deficiency     Cardiomyopathy of undetermined type (H)     HUEY (obstructive sleep apnea)- moderate (AHI 21)     Acute medial meniscus tear of right knee     Nasal congestion     Past Medical History:   Diagnosis Date     Anxiety 05/01/2017    resolved 2018     Psychophysiological insomnia 05/01/2017    resolved 2018     Past Surgical History:   Procedure Laterality Date     COLONOSCOPY WITH CO2 INSUFFLATION N/A 10/24/2014    Procedure: COLONOSCOPY WITH CO2 INSUFFLATION;  Surgeon: Duane, William Charles, MD;  Location: [facility] OR     HC TOOTH EXTRACTION W/FORCEP         Social History:  Patient reports that he has quit smoking. His smoking use included cigarettes. He has a 2.00 pack-year smoking history. he has never used smokeless tobacco. He reports that he drinks about 0.5 - 1.0 oz of alcohol per week. He reports that he does not use drugs. patient is self employed.  to Dr. Plaza.       Family History:  Family History   Problem Relation Age of Onset     C.A.D. Father      Coronary Artery Disease Father      Eye Surgery Mother      Diabetes Maternal Grandmother      Colon Cancer No family hx of      Glaucoma No family hx of      Macular Degeneration No family hx of        Medications:  Current Outpatient Medications   Medication Sig Dispense Refill     aspirin EC 81 MG tablet Take 1 tablet (81 mg) by mouth every other day       atorvastatin (LIPITOR) 10 MG tablet Take 1 tablet (10 mg) by mouth daily 90 tablet 3     Cholecalciferol (VITAMIN D) 1000 UNITS capsule Take 1 capsule by mouth daily       mometasone (NASONEX) 50 MCG/ACT nasal spray Spray 2 sprays into both nostrils daily 1 g 0     Multiple Vitamin (MULTI-VITAMIN) per tablet Take 1 tablet by mouth daily.       omega-3 fatty acids  1200 MG capsule Take 2 capsules by mouth daily.       order for DME autoCPAP 5-18 cmH20 1 Device 0     tobramycin-dexamethasone (TOBRADEX) 0.3-0.1 % ophthalmic ointment Apply to eyelids 3 x day for 1 week. 1 Tube 0       No Known Allergies    Review of Systems:  -Constitutional: Patient is otherwise feeling well, in usual state of health.   -Skin: As above in HPI. No additional skin concerns.    Physical exam:  Vitals: There were no vitals taken for this visit.  GEN: This is a well developed, well-nourished male in no acute distress, in a pleasant mood.    SKIN: Sun-exposed skin, which includes the head/face, neck, both arms, digits, and/or nails was examined.   - Scalp clear.  - Eczematous plaques on the preauricular cheeks extending into extremal ears mostly involving in the conchal bowl.  - Lateral canthus extending up to the upper eyelids, there are well defined, lichenified pink eczematous plaques, also scattered in the eye brows and nasolabial folds bilaterally  - Depigmented patches that fluoresce with woods lamp inferior aspect of the lip, nasal side wall, nasolabial folds  - No other lesions of concern on areas examined.       Impression/Plan:  1. Eczematous facial eruption. The quality and distribution of the rash is very suspicious for allergic reaction. Typically, I will treat eczematous appearing rashes with topical steroids and calcineurin inhibitors to see if I can clear. If no clearance, I do worry more about an allergic contact dermatitis driving continued rash. Since patient has already failed both treatments, I do think allergic contact dermatitis is the most likely diagnosis. Consider diagnosis of severe seborrheic dermatitis or psoriasis given external ear involvement, but the quality of the rash is most fitting with ACD. At this time I recommended a skin biopsy to look to see what features predominate. While skin biopsies of rashes on the face are not often diagnostic of one condition, they can  be very helpful with determining line of treatment. If spongiotic dermatitis with eosinophils is seen, I think having the patient switch to Vanicream/Free and Clear product line and undergoing patch testing would be best. Discussed risks and benefits of biopsy in detail. After extensive discussion, patient is unsure, and very hesitant and would like to discuss with his wife (Dr. Plaza) prior to pursuing a biopsy.     Hold neomycin, polymyxin, and dexamthasone ointment as the antibiotics in this are common causes of allergic contact dermatitis.    Continue protopic 0.1% ointment BID for facial rash for now.     I will follow up with patients wife to discuss the biopsy procedure.     If wife and patient agreeable, will proceed with biopsy next week.     2. Vitiligo: Long standing per the patient.     Will readdress when facial rash     Follow up pending biopsy results.       Staff Involved:  Scribe/Staff    Scribe Disclosure  I, Rach Stapleton, am serving as a scribe to document services personally performed by Dr. Lo Velázquez MD, based on data collection and the provider's statements to me.     Provider Disclosure:   The documentation recorded by the scribe accurately reflects the services I personally performed and the decisions made by me.    Lo Velázquez MD    Department of Dermatology  [facility]: Phone: [phone], Fax:[phone]  [facility]: Phone: [phone], Fax: [phone]                Again, thank you for allowing me to participate in the care of your patient.        Sincerely,        Lo Velázquez MD

## 2019-01-10 NOTE — NURSING NOTE
Eloise He's goals for this visit include:   Chief Complaint   Patient presents with     Rash     Rash on face, pt states has been going on for 1-2 years, and it comes in cycles. Pt has used protopic and cerave creams, also neomycin and pollymyxin b sulfates and dexamethasone ointment for eye lids. No personal or family hx of SC.      He requests these members of his care team be copied on today's visit information:     PCP: Fabian Crowder    Referring Provider:  No referring provider defined for this encounter.    There were no vitals taken for this visit.    Do you need any medication refills at today's visit? No    Glory Gusman LPN

## 2019-01-17 ENCOUNTER — OFFICE VISIT (OUTPATIENT)
Dept: DERMATOLOGY | Facility: CLINIC | Age: 49
End: 2019-01-17
Payer: COMMERCIAL

## 2019-01-17 DIAGNOSIS — R21 RASH/SKIN ERUPTION: Primary | ICD-10-CM

## 2019-01-17 PROCEDURE — 11104 PUNCH BX SKIN SINGLE LESION: CPT | Performed by: DERMATOLOGY

## 2019-01-17 PROCEDURE — 88305 TISSUE EXAM BY PATHOLOGIST: CPT | Mod: TC | Performed by: DERMATOLOGY

## 2019-01-17 PROCEDURE — 88312 SPECIAL STAINS GROUP 1: CPT | Mod: TC | Performed by: DERMATOLOGY

## 2019-01-17 ASSESSMENT — PAIN SCALES - GENERAL: PAINLEVEL: NO PAIN (0)

## 2019-01-17 NOTE — NURSING NOTE
Eloise He's goals for this visit include:   Chief Complaint   Patient presents with     Biopsy     Punch bx- follow up from appointment last week.      He requests these members of his care team be copied on today's visit information:     PCP: Fabian Crowder    Referring Provider:  No referring provider defined for this encounter.    There were no vitals taken for this visit.    Do you need any medication refills at today's visit? Lolly Gusman LPN

## 2019-01-17 NOTE — NURSING NOTE
The following medication was given by Dr. Velázquez:    MEDICATION:  Lidocaine with epinephrine 1% 1:[id number]  ROUTE: SQ  SITE: see procedure note  DOSE: 2 cc  LOT #: -[id number]  : Hospira  EXPIRATION DATE: 12-19  NDC#: 9148-1763-05   Was there drug waste? No  Multi-dose vial: Yes    Glory Gusman LPN  January 17, 2019

## 2019-01-17 NOTE — PROGRESS NOTES
"[facility] Dermatology Note      Dermatology Problem List:  1. Hx of wart right posterior upper arm, s/p biopsy 10/11/2018  2. Hx of eyelid dermatitis  -Previously seen at [facility]: treated with Alcometazone cream, Westcort ointment, protopic ointment --- all lost efficacy  -Concerned for ACD, recommended biopsy, patient to consider  3. Vitiligo: hx of eczimer laser, topical steroids, and protopic 0.1% ointment  4. Rash/eruption, right lateral canthus, s/p biopsy 1/17/2019    Encounter Date: Jan 17, 2019    CC:  Chief Complaint   Patient presents with     Biopsy     Punch bx- follow up from appointment last week.          History of Present Illness:  Mr. Navas \"Leobardo\" He is a 48 year old male who presents as a follow up for a punch biopsy. He was last seen 1/10/2019 by Dr. Velázquez. He talked to his wife, Dr. Plaza, and elects to proceed with the biopsy at this time. No other concerns addressed today.      Past Medical History:   Patient Active Problem List   Diagnosis     Mixed hyperlipidemia     Chronic fatigue     Cough     High myopia, both eyes     Family history of early CAD     HDL deficiency     Cardiomyopathy of undetermined type (H)     HUEY (obstructive sleep apnea)- moderate (AHI 21)     Acute medial meniscus tear of right knee     Nasal congestion     Past Medical History:   Diagnosis Date     Anxiety 05/01/2017    resolved 2018     Psychophysiological insomnia 05/01/2017    resolved 2018     Past Surgical History:   Procedure Laterality Date     COLONOSCOPY WITH CO2 INSUFFLATION N/A 10/24/2014    Procedure: COLONOSCOPY WITH CO2 INSUFFLATION;  Surgeon: Duane, William Charles, MD;  Location: [facility] OR     HC TOOTH EXTRACTION W/FORCEP         Social History:  Patient reports that he has quit smoking. His smoking use included cigarettes. He has a 2.00 pack-year smoking history. he has never used smokeless tobacco. He reports that he drinks about 0.5 - 1.0 oz of alcohol per week. " He reports that he does not use drugs. patient is self employed.  to Dr. Plaza.       Family History:  Family History   Problem Relation Age of Onset     C.A.D. Father      Coronary Artery Disease Father      Eye Surgery Mother      Diabetes Maternal Grandmother      Colon Cancer No family hx of      Glaucoma No family hx of      Macular Degeneration No family hx of      Skin Cancer No family hx of        Medications:  Current Outpatient Medications   Medication Sig Dispense Refill     aspirin EC 81 MG tablet Take 1 tablet (81 mg) by mouth every other day       atorvastatin (LIPITOR) 10 MG tablet Take 1 tablet (10 mg) by mouth daily 90 tablet 3     Cholecalciferol (VITAMIN D) 1000 UNITS capsule Take 1 capsule by mouth daily       mometasone (NASONEX) 50 MCG/ACT nasal spray Spray 2 sprays into both nostrils daily 1 g 0     Multiple Vitamin (MULTI-VITAMIN) per tablet Take 1 tablet by mouth daily.       omega-3 fatty acids 1200 MG capsule Take 2 capsules by mouth daily.       order for DME autoCPAP 5-18 cmH20 1 Device 0     tobramycin-dexamethasone (TOBRADEX) 0.3-0.1 % ophthalmic ointment Apply to eyelids 3 x day for 1 week. 1 Tube 0       No Known Allergies    Review of Systems:  -Constitutional: Patient is otherwise feeling well, in usual state of health.   -Skin: As above in HPI. No additional skin concerns.    Physical exam:  Vitals: There were no vitals taken for this visit.  GEN: This is a well developed, well-nourished male in no acute distress, in a pleasant mood.    SKIN: Sun-exposed skin, which includes the head/face, neck, both arms, digits, and/or nails was examined.   - Eczematous plaques on the preauricular cheeks extending into extremal ears mostly involving in the conchal bowl.  - Lateral canthus extending up to the upper eyelids, there are well defined, lichenified pink eczematous plaques, also scattered in the eye brows and nasolabial folds bilaterally  - No other lesions of concern on areas  examined.   - Patient declines photo of full facial rash at this time.               Impression/Plan:  1. Eczematous facial eruption, right lateral canthus. Not responsive to topical steroids and protopic. Suspicious for ACD, but distribution could also fit with seb derm/psoriasis. DDx: ACD vs seb derm vs psoriasis. Patient agreeable to proceed with biopsy today. Note: patient declines photo of full facial rash despite discussion that inclusion can be helpful for clinical-path correlation.     Punch biopsy:  After discussion of benefits and risks including but not limited to bleeding/bruising, pain/swelling, infection, scar, incomplete removal, nerve damage/numbness, recurrence, and non-diagnostic biopsy, written consent, verbal consent and photographs were obtained. Time-out was performed. The area was cleaned with isopropyl alcohol. 0.5mL of 1% lidocaine with 1:100,000 epinephrine was injected to obtain adequate anesthesia of the lesion on the right lateral canthus.  A 2 mm punch biopsy was performed. 5-0 prolene sutures were utilized to approximate the epidermal edges. White petroleum jelly/Vaseline and a bandage was applied to the wound. Explicit verbal and written wound care instructions were provided. The patient left the Dermatology Clinic in good condition. The patient was counseled to complete suture removal in approximately 5 days. He did not want to come back to clinic for suture removal.     Follow up pending biopsy results.     Staff Involved:  Scribe/Staff    Scribe Disclosure  I, Rach Stapleton, am serving as a scribe to document services personally performed by Dr. Lo Velázquez MD, based on data collection and the provider's statements to me.     Provider Disclosure:   The documentation recorded by the scribe accurately reflects the services I personally performed and the decisions made by me.    Lo Velázquez MD    Department of Dermatology  [facility]  [facility]: Phone: [phone], Fax:[phone]  [facility]: Phone: [phone], Fax: [phone]

## 2019-01-17 NOTE — LETTER
"    1/17/2019         RE: Eloise Johnson  [address] [place] [address]        Dear Colleague,    Thank you for referring your patient, Eloise Johnson, to the [facility]. Please see a copy of my visit note below.    [facility] Dermatology Note      Dermatology Problem List:  1. Hx of wart right posterior upper arm, s/p biopsy 10/11/2018  2. Hx of eyelid dermatitis  -Previously seen at [facility]: treated with Alcometazone cream, Westcort ointment, protopic ointment --- all lost efficacy  -Concerned for ACD, recommended biopsy, patient to consider  3. Vitiligo: hx of eczimer laser, topical steroids, and protopic 0.1% ointment  4. Rash/eruption, right lateral canthus, s/p biopsy 1/17/2019    Encounter Date: Jan 17, 2019    CC:  Chief Complaint   Patient presents with     Biopsy     Punch bx- follow up from appointment last week.          History of Present Illness:  Mr. Navas \"Leobardo\" He is a 48 year old male who presents as a follow up for a punch biopsy. He was last seen 1/10/2019 by Dr. Velázquez. He talked to his wife, Dr. Plaza, and elects to proceed with the biopsy at this time. No other concerns addressed today.      Past Medical History:   Patient Active Problem List   Diagnosis     Mixed hyperlipidemia     Chronic fatigue     Cough     High myopia, both eyes     Family history of early CAD     HDL deficiency     Cardiomyopathy of undetermined type (H)     HUEY (obstructive sleep apnea)- moderate (AHI 21)     Acute medial meniscus tear of right knee     Nasal congestion     Past Medical History:   Diagnosis Date     Anxiety 05/01/2017    resolved 2018     Psychophysiological insomnia 05/01/2017    resolved 2018     Past Surgical History:   Procedure Laterality Date     COLONOSCOPY WITH CO2 INSUFFLATION N/A 10/24/2014    Procedure: COLONOSCOPY WITH CO2 INSUFFLATION;  Surgeon: Duane, William Charles, MD;  Location: [facility] OR     HC TOOTH EXTRACTION W/FORCEP   "       Social History:  Patient reports that he has quit smoking. His smoking use included cigarettes. He has a 2.00 pack-year smoking history. he has never used smokeless tobacco. He reports that he drinks about 0.5 - 1.0 oz of alcohol per week. He reports that he does not use drugs. patient is self employed.  to Dr. Plaza.       Family History:  Family History   Problem Relation Age of Onset     C.A.D. Father      Coronary Artery Disease Father      Eye Surgery Mother      Diabetes Maternal Grandmother      Colon Cancer No family hx of      Glaucoma No family hx of      Macular Degeneration No family hx of      Skin Cancer No family hx of        Medications:  Current Outpatient Medications   Medication Sig Dispense Refill     aspirin EC 81 MG tablet Take 1 tablet (81 mg) by mouth every other day       atorvastatin (LIPITOR) 10 MG tablet Take 1 tablet (10 mg) by mouth daily 90 tablet 3     Cholecalciferol (VITAMIN D) 1000 UNITS capsule Take 1 capsule by mouth daily       mometasone (NASONEX) 50 MCG/ACT nasal spray Spray 2 sprays into both nostrils daily 1 g 0     Multiple Vitamin (MULTI-VITAMIN) per tablet Take 1 tablet by mouth daily.       omega-3 fatty acids 1200 MG capsule Take 2 capsules by mouth daily.       order for DME autoCPAP 5-18 cmH20 1 Device 0     tobramycin-dexamethasone (TOBRADEX) 0.3-0.1 % ophthalmic ointment Apply to eyelids 3 x day for 1 week. 1 Tube 0       No Known Allergies    Review of Systems:  -Constitutional: Patient is otherwise feeling well, in usual state of health.   -Skin: As above in HPI. No additional skin concerns.    Physical exam:  Vitals: There were no vitals taken for this visit.  GEN: This is a well developed, well-nourished male in no acute distress, in a pleasant mood.    SKIN: Sun-exposed skin, which includes the head/face, neck, both arms, digits, and/or nails was examined.   - Eczematous plaques on the preauricular cheeks extending into extremal ears mostly involving  in the conchal bowl.  - Lateral canthus extending up to the upper eyelids, there are well defined, lichenified pink eczematous plaques, also scattered in the eye brows and nasolabial folds bilaterally  - No other lesions of concern on areas examined.   - Patient declines photo of full facial rash at this time.               Impression/Plan:  1. Eczematous facial eruption, right lateral canthus. Not responsive to topical steroids and protopic. Suspicious for ACD, but distribution could also fit with seb derm/psoriasis. DDx: ACD vs seb derm vs psoriasis. Patient agreeable to proceed with biopsy today. Note: patient declines photo of full facial rash despite discussion that inclusion can be helpful for clinical-path correlation.     Punch biopsy:  After discussion of benefits and risks including but not limited to bleeding/bruising, pain/swelling, infection, scar, incomplete removal, nerve damage/numbness, recurrence, and non-diagnostic biopsy, written consent, verbal consent and photographs were obtained. Time-out was performed. The area was cleaned with isopropyl alcohol. 0.5mL of 1% lidocaine with 1:100,000 epinephrine was injected to obtain adequate anesthesia of the lesion on the right lateral canthus.  A 2 mm punch biopsy was performed. 5-0 prolene sutures were utilized to approximate the epidermal edges. White petroleum jelly/Vaseline and a bandage was applied to the wound. Explicit verbal and written wound care instructions were provided. The patient left the Dermatology Clinic in good condition. The patient was counseled to complete suture removal in approximately 5 days. He did not want to come back to clinic for suture removal.     Follow up pending biopsy results.     Staff Involved:  Scribe/Staff    Scribe Disclosure  I, Rach Stapleton, am serving as a scribe to document services personally performed by Dr. Lo Velázquez MD, based on data collection and the provider's statements to me.     Provider  Disclosure:   The documentation recorded by the scribe accurately reflects the services I personally performed and the decisions made by me.    Lo Velázquez MD    Department of Dermatology  [facility]: Phone: [phone], Fax:[phone]  [facility]: Phone: [phone], Fax: [phone]                      Again, thank you for allowing me to participate in the care of your patient.        Sincerely,        Lo Velázquez MD

## 2019-01-22 LAB — COPATH REPORT: NORMAL

## 2019-01-23 ENCOUNTER — TELEPHONE (OUTPATIENT)
Dept: DERMATOLOGY | Facility: CLINIC | Age: 49
End: 2019-01-23

## 2019-01-23 DIAGNOSIS — L21.9 DERMATITIS, SEBORRHEIC: Primary | ICD-10-CM

## 2019-01-23 RX ORDER — KETOCONAZOLE 20 MG/ML
SHAMPOO TOPICAL
Qty: 120 ML | Refills: 11 | Status: SHIPPED | OUTPATIENT
Start: 2019-01-23 | End: 2019-11-20

## 2019-01-23 RX ORDER — KETOCONAZOLE 20 MG/G
CREAM TOPICAL
Qty: 60 G | Refills: 11 | Status: SHIPPED | OUTPATIENT
Start: 2019-01-23 | End: 2019-11-20

## 2019-01-23 NOTE — TELEPHONE ENCOUNTER
Notes recorded by Glory Gusman LPN on 1/23/2019 at 8:24 AM CST  Called pt and made aware of results. Pt verbalized understanding and had no concerns or questions. Dr. Velázquez will send in prescription for the ketoconazole shampoo and cream as the pt has never tried these. Reminded pt to call in 3 weeks if not better so Dr. Velázquez can send in the referral to patch testing.     Glory Gusman LPN    ------    Notes recorded by Lo Velázquez MD on 1/22/2019 at 4:32 PM CST  Please call patient and let him know biopsy showed eczema like process. The pathologist felt two different diagnoses were likely based on the features - either seborrheic dermatitis (which is an overreaction to yeast on the skin) or allergic contact dermatitis (An allergy to something coming into contact with the skin). The pathologist favored seborrheic dermatitis. The treatment for this would be anti-yeast medications and anti-inflammatories.      Please see if the patient has ever tried ketoconazole shampoo or cream before.  If he has not, I will prescribe these (cream to be applied BID and shampoo to be used daily as face wash).  If no response after 3 weeks, will have patient call and then would place referral to patch testing to rule out allergic contact dermatitis.     If patient has tried ketoconazole preparations without success, then I think we should move straight to patch testing.    Let me know what patient says.     Lo Velázquez MD    Department of Dermatology  [facility]: Phone: [phone], Fax:[phone]  [facility]: Phone: [phone], Fax: [phone]

## 2019-01-23 NOTE — PROGRESS NOTES
Pt lvm asking for results. I Called pt back and lvm for cb to go over results of PAP NAP.    Carla Sequeira MA on 1/23/2019 at 8:41 AM

## 2019-01-23 NOTE — TELEPHONE ENCOUNTER
[facility] Call Center    Phone Message    May a detailed message be left on voicemail: yes    Reason for Call: Other: patient is calling for clarification on medication directions for the ketoconazole (NIZORAL) 2 % external shampoo.  Please call in the morning and advise.      Action Taken: Message routed to:  [facility]: Dermatology p [phone]

## 2019-01-24 NOTE — TELEPHONE ENCOUNTER
"LVM for pt to call clinic back at [phone].     Pt is to use the ketoconazole shampoo and cream as indicated below:     \"(cream to be applied BID and shampoo to be used daily as face wash).  If no response after 3 weeks, will have patient call and then would place referral to patch testing to rule out allergic contact dermatitis.     If patient has tried ketoconazole preparations without success, then I think we should move straight to patch testing.    Lo Velázquez MD\"    "

## 2019-01-28 ENCOUNTER — ALLIED HEALTH/NURSE VISIT (OUTPATIENT)
Dept: NURSING | Facility: CLINIC | Age: 49
End: 2019-01-28
Payer: COMMERCIAL

## 2019-01-28 DIAGNOSIS — Z48.02 VISIT FOR SUTURE REMOVAL: Primary | ICD-10-CM

## 2019-01-28 PROCEDURE — 99207 ZZC NO CHARGE NURSE ONLY: CPT

## 2019-01-28 NOTE — NURSING NOTE
Eloise He comes into clinic today at the request of Dr. Velázquez Ordering Provider for suture removal.      Dermatology Suture Removal Record  S: Eloise presents to the clinic today for  removal of sutures.  The patient has had the suture in place for 11 days.    There has been no history of infection or drainage.    O: 1 suture is seen located on the right crows feet.  The wound is healing well with no signs of infection.    A: Suture removal.    P:  The suture was easily removed today.  Vaseline applied. Routine wound care discussed.  The patient will follow up as needed.    This service provided today was under the supervising provider of the day Dr. Velázquez, who was available if needed.    Michaela Daley RN

## 2019-01-28 NOTE — TELEPHONE ENCOUNTER
Medication use clarified with patient at his suture removal appt today.  He verbalized understanding and had no further questions.  Michaela Daley RN

## 2019-02-04 NOTE — PROGRESS NOTES
Pt called in stating that he was waiting on results from PAP nap. Informed pt that I had left two messages for RTC.     Discussed results. Pt decided to try medication first instead of pressure changes. He will call clinic if he decides to change pressures. He did state as of 2/4/19 he had not been using machine. I instructed him that per Dr. Reagan's note a rx was sent to [facility]. I also informed the pt that he has an appt on 2/18/19 with Dr. Reagan. He agreed to about.    Carla Sequeira MA on 2/4/2019 at 2:13 PM

## 2019-02-08 DIAGNOSIS — Z20.828 EXPOSURE TO INFLUENZA: Primary | ICD-10-CM

## 2019-02-08 RX ORDER — OSELTAMIVIR PHOSPHATE 75 MG/1
75 CAPSULE ORAL DAILY
Qty: 10 CAPSULE | Refills: 0 | Status: SHIPPED | OUTPATIENT
Start: 2019-02-08 | End: 2019-07-24

## 2019-02-08 NOTE — PROGRESS NOTES
Son diagnosed with influenza A today and started on Tamiflu treatment. Patient has not had flu vaccine this year and requests Tamiflu prophylaxis. Rx for adult flu prophylaxis sent to [facility] in [place] (75 mg daily x 10 days).  Patient said he may or may not  rx, but it will be at the pharmacy in case.

## 2019-02-18 ENCOUNTER — TELEPHONE (OUTPATIENT)
Dept: DERMATOLOGY | Facility: CLINIC | Age: 49
End: 2019-02-18

## 2019-02-18 DIAGNOSIS — L30.8 SPONGIOTIC DERMATITIS: Primary | ICD-10-CM

## 2019-02-18 NOTE — TELEPHONE ENCOUNTER
Patient notified of Dr. Velázquez's reply.  He verbalized understanding and agreed with the plan.  Follow up appt canceled.  I notified him that Dr. Loyd's office will contact him to schedule.    Michaela Daley RN

## 2019-02-18 NOTE — TELEPHONE ENCOUNTER
Patient called to update Dr. Velázquez on how his eczema is doing.  He states that it is has somewhat improved, but he has a lot of scaling.  His right lateral canthus he reports is worse that his left eyelid.  He used the ketoconazole cream and shampoo as directed.    In reviewing his biopsy results I notified him that the next step is patch testing.  Patient agreeable with this plan, but requested an appointment so Dr. Velázquez could see the scaling.    I notified him that he could send a photo via [employer] and that an appointment was not necessary.  Patient declined and requested an appointment.    He is inquiring if he should continue with the ketoconazole cream and shampoo until he can get in for patch testing.    Michaela Daley RN

## 2019-02-18 NOTE — TELEPHONE ENCOUNTER
Agree, no need for an appointment. Dr. Loyd does an evaluation appointment before, so he can see the scale, but I assume it will be very similar to what I saw previously.     Yes, should continue ketoconazole shampoo and cream as they certainly are not harmful and maybe mildly helpful.    Michaela, please place referral to dermatology. Add in scheduling comments Dr. Loyd for patch testing consideration. Do not put patch testing in the drop down boxes.     Thanks,    Lo Velázquez MD    Department of Dermatology  [facility]: Phone: [phone], Fax:[phone]  [facility]: Phone: [phone], Fax: [phone]

## 2019-02-25 ENCOUNTER — TELEPHONE (OUTPATIENT)
Dept: DERMATOLOGY | Facility: CLINIC | Age: 49
End: 2019-02-25

## 2019-02-25 NOTE — TELEPHONE ENCOUNTER
Pt returning Roshni's call. Pt notified that Roshni is at lunch at the current time but will notify her of pt's return call. Pt prefers to be called on his mobile number and will try to answer when Roshni returns the call as he is at work...Giulia Canales RN

## 2019-02-25 NOTE — TELEPHONE ENCOUNTER
Patient calling to follow up on referral to Allergy; he has not heard from Rach's office to schedule. Writer is able to help Leobardo schedule with the [facility] for a new consultation:   REF: Patch Consult r/t eczema-- Melania

## 2019-02-26 NOTE — TELEPHONE ENCOUNTER
1. Patient would like a call to discuss upcoming appointment and what to expect.     Writer called to help Leobardo schedule with Dr. Loyd. Patient has questions about what to expect for his upcoming appointment. Writer explained that this appointment is for an allergy consultation; testing, if necessary, probably will not happen on the same day.    Roshni Sanchez LPN

## 2019-02-27 ENCOUNTER — DOCUMENTATION ONLY (OUTPATIENT)
Dept: CARE COORDINATION | Facility: CLINIC | Age: 49
End: 2019-02-27

## 2019-02-27 NOTE — TELEPHONE ENCOUNTER
Patient called back and Maria Esther went over everything there is with patch testing. Patient understood.   BACILIO Rhodes

## 2019-03-04 ENCOUNTER — OFFICE VISIT (OUTPATIENT)
Dept: DERMATOLOGY | Facility: CLINIC | Age: 49
End: 2019-03-04
Attending: DERMATOLOGY
Payer: COMMERCIAL

## 2019-03-04 ENCOUNTER — PRE VISIT (OUTPATIENT)
Dept: DERMATOLOGY | Facility: CLINIC | Age: 49
End: 2019-03-04

## 2019-03-04 DIAGNOSIS — L23.89 ALLERGIC CONTACT DERMATITIS DUE TO OTHER AGENTS: Primary | ICD-10-CM

## 2019-03-04 DIAGNOSIS — L21.9 DERMATITIS, SEBORRHEIC: ICD-10-CM

## 2019-03-04 ASSESSMENT — PAIN SCALES - GENERAL: PAINLEVEL: NO PAIN (0)

## 2019-03-04 NOTE — TELEPHONE ENCOUNTER
FUTURE VISIT INFORMATION      FUTURE VISIT INFORMATION:    Date: 3/4    Time: 415    Location: Derm  REFERRAL INFORMATION:    Referring provider:  Dr. Velázquez    Referring providers clinic:  Derm    Reason for visit/diagnosis  NAL    RECORDS REQUESTED FROM:       Clinic name Comments Records Status Imaging Status                                         All Records Internal

## 2019-03-04 NOTE — NURSING NOTE
Dermatology Rooming Note    Eloise He's goals for this visit include:   Chief Complaint   Patient presents with     Allergies     Leobardo is here for allergy consult     Rowena Serrato, CMA

## 2019-03-04 NOTE — PROGRESS NOTES
[facility] Dermatology Note    Dermatology Clinic  [facility]  [address] [place] [id number]    Dermatology Problem List:  1. Hx of wart right posterior upper arm, s/p biopsy 10/11/2018  2. Hx of eyelid dermatitis  -Previously seen at [facility]: treated with Alcometazone cream, Westcort ointment, protopic ointment --- all lost efficacy  -Concerned for ACD, biopsy 1/17/19 most c/w seborrheic dermatitis   3. Vitiligo: hx of eczimer laser, topical steroids, and protopic 0.1% ointment  4. Rash/eruption, right lateral canthus, s/p biopsy 1/17/2019 with subacute spongiotic dermatitis    Encounter Date: Mar 4, 2019    CC:  Chief Complaint   Patient presents with     Allergies     Leobardo is here for allergy consult       History of Present Illness:  Mr. Navas He is a 48 year old male who presents as a referral from Dr. Velázquez in regards to a patch testing consult. His previous visit with Dr. Velázquez was on 01/17/2019 where he received a punch biopsy for the facial rash/skin eruption around the right side of his eye. Biopsy showed subacute spongiotic dermatitis with no prominent Langerhans' cell collections or significant tissue eosinophilia. The patient has been unresponsive to topical steroids and protopic treatments in the past, but has not tried Elidel. He started ketoconazole shampoo and cream after receiving the biopsy results as it was read as more consistent with seborrheic dermatitis and has noticed some improvement.     Today he states the rash is much better but not gone. He still has some redness, scaling, and itching on and around his eyelids, on his external ears, a patch on his back, and a patch on his right thigh.. He tries to remove the scale in the gym steam room but it quickly reaccumulates. The rash does not happen in the summer and is worse in the winter with lots of redness, scaling, and itchiness at night.      An allergy to gym towels or another product at his gym was considered because the rash started after he began going to the gym. He had no change in the rash after avoiding the gym for 1 week. He uses Head and Shoulders shampoo and Vanicream moisturizer on his face. Denies using hairspray or other products on his face and head. He used hydrocortisone on his right thigh without significant improvement in the past, so now only uses Vaseline in that area. Denies perfumes or essential oil diffusers in the home. 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 Hobbies include exercising at the gym, using the hot tub, and singing Karaoke.  He notices redness with a small amount of pressure when rubbing face with a towel or applying medications.   He has history of eczema a few years on his face that resolved with some cream. A few months ago (before holidays) he had several weeks of laser treatment on left and right cheek at [facility] in [place] for hypopigmentation/vitiligo.     No personal or family history of asthma or seasonal allergies.  No lip swelling.    He wonders if this is neurodermatitis. He is reluctant to pursue allergy testing because he heard that he would not be able to shower for a week.        Past Medical History:   Patient Active Problem List   Diagnosis     Mixed hyperlipidemia     Chronic fatigue     Cough     High myopia, both eyes     Family history of early CAD     HDL deficiency     Cardiomyopathy of undetermined type (H)     HUEY (obstructive sleep apnea)- moderate (AHI 21)     Acute medial meniscus tear of right knee     Nasal congestion     Past Medical History:   Diagnosis Date     Anxiety 05/01/2017    resolved 2018     Psychophysiological insomnia 05/01/2017    resolved 2018     Past Surgical History:   Procedure Laterality Date     COLONOSCOPY WITH CO2 INSUFFLATION N/A 10/24/2014     Procedure: COLONOSCOPY WITH CO2 INSUFFLATION;  Surgeon: Duane, William Charles, MD;  Location: [facility] OR     HC TOOTH EXTRACTION W/FORCEP         Social History:  Patient reports that he has quit smoking. His smoking use included cigarettes. He has a 2.00 pack-year smoking history. he has never used smokeless tobacco. He reports that he drinks about 0.5 - 1.0 oz of alcohol per week. He reports that he does not use drugs.    Family History:  Family History   Problem Relation Age of Onset     C.A.D. Father      Coronary Artery Disease Father      Eye Surgery Mother      Diabetes Maternal Grandmother      Colon Cancer No family hx of      Glaucoma No family hx of      Macular Degeneration No family hx of      Skin Cancer No family hx of        Medications:  Current Outpatient Medications   Medication Sig Dispense Refill     aspirin EC 81 MG tablet Take 1 tablet (81 mg) by mouth every other day       atorvastatin (LIPITOR) 10 MG tablet Take 1 tablet (10 mg) by mouth daily 90 tablet 3     ketoconazole (NIZORAL) 2 % external cream Apply up to twice daily to the face as needed. 60 g 11     Multiple Vitamin (MULTI-VITAMIN) per tablet Take 1 tablet by mouth daily.       omega-3 fatty acids 1200 MG capsule Take 2 capsules by mouth daily.       Cholecalciferol (VITAMIN D) 1000 UNITS capsule Take 1 capsule by mouth daily       ketoconazole (NIZORAL) 2 % external shampoo Lather small amount onto wet face/scalp, leave on 3-5 min, then rinse out. Use daily on the face, 2-3 times weekly on the scalp. (Patient not taking: Reported on 3/4/2019) 120 mL 11     mometasone (NASONEX) 50 MCG/ACT nasal spray Spray 2 sprays into both nostrils daily (Patient not taking: Reported on 3/4/2019) 1 g 0     order for DME autoCPAP 5-18 cmH20 (Patient not taking: Reported on 3/4/2019) 1 Device 0     tobramycin-dexamethasone (TOBRADEX) 0.3-0.1 % ophthalmic ointment Apply to eyelids 3 x day for 1 week. (Patient not taking: Reported on 3/4/2019) 1 Tube 0        No Known Allergies    Review of Systems:  -As per HPI  -Otherwise nml state of health. No other skin concerns.    Physical exam:  Vitals: There were no vitals taken for this visit.  GEN: This is a well developed, well-nourished male in no acute distress, in a pleasant mood.    SKIN: Focused examination of the face, neck, and right thigh was performed.  -Erythematous, lichenified, white-scaled plaques on bilateral upper eyelids above eyebrows superiorly and laterally. Upper eyelid swelling noted in prior photos decreased today.    -Erythematous, lichenified, white scaled plaques on preauricular cheeks extending to external ears and into conchal bowl.  -Erythematous plaques with scattered erosions and linear red-brown crusting on bilateral nasolabial creases  -Circular, eczematous plaque on posterior neck/upper back and on right anterior thigh.  -No other lesions of concern on areas examined.        Order for PATCH TESTS    [x] Outpatient  [] Inpatient: Álvarez..../ Bed ....      Skin Atopy (atopic dermatitis) [x] Yes   [] No  Rhinitis/Sinusitis:   [] Yes   [x] No  Allergic Asthma:   [] Yes   [x] No  Food Allergy:   [] Yes   [x] No  Leg ulcers:   [] Yes   [x] No  Hand eczema:   [] Yes   [x] No   Leading hand:   [] R   [] L       [] Ambidextrous                        Reason for tests (suspected allergy): Distribution on face and neck concerning for reaction to Head & Shoulders shampoo, gym towelsor laundry detergent  Known previous allergies: None     Standardized panels  [x] Standard panel (40 tests)  [x] Preservatives & Antimicrobials (31 tests)  [x] Emulsifiers & Additives (25 tests)   [] Perfumes/Flavours & Plants (25 tests)  [] Hairdresser panel (12 tests)  [] Rubber Chemicals (22 tests)  [] Plastics (26 tests)  [] Colorants/Dyes/Food additives (20 tests)  [] Metals (implants/dental) (23 tests)  [] Local anaesthetics/NSAIDs (13 tests)  [x] Antibiotics & Antimycotics (14 tests)   [] Corticosteroids (15 tests)    [] Photopatch test (62 tests)   [] others: ...      [] Patient's own products: ...    DO NOT test if chemical or biological identity is unknown!     always ask from patient the product information and safety sheets (MSDS)     [] Patient needs consultation with Allergy team (changes of tests may apply)  [] Tests discussed with Allergy team (can have direct appointment for patch tests)      Impression/Plan:    >>Subacute to chronic allergic contact dermatitis. Differential diagnosis includes atopic dermatitis.   Allergic contact dermatitis is more likely at this point given new onset, lack of atopic predisposition (no seasonal allergies or asthma), and distrubution.  Plan for patch testing to identify sensitivities or to rule out ACD if negative and treat as atopic dermatitis. Seborrheic dermatitis suggested in biopsy comments but is not consistent with location, degree of erythema, and lichenification seen on exam. The scale present is secondary to lichenification and is not greasy or yellow.      Recommended patch testing as above to work up ACD. Discussed that this is a necessary step in determining what products to avoid or to rule out ACD so that there is a clear diagnosis and treatment could then be targeted to atopic dermatitis. Discussed that he cannot get his back wet or shower during the week of patch testing, but he can carefully wash his hair and use a wash cloth on the rest of his body to clean. The patient elected to pursue patch testing and was scheduled for this.    Treatment  -Stop Head & Shoulders shampoo, switch to Vanicream brand Free and Clear shampoo. Continue Vanicream moisturizer.      Follow-up for patch testing at next available appointment.       Staff Involved:  I, Teresa Guadalupe, MS4, saw and examined this patient in the presence of Dr. Loyd.  Staff Physician Comments:  I was present with the medical student who participated in the service and in the documentation of the note. I  have verified the history and personally performed the physical exam and medical decision making. I agree with the assessment and plan as documented in the note. I have reviewed and if necessary amended the note.      Garrick Loyd MD  Professor  Head of Dermato-Allergy Division  Department of Dermatology  [facility]      Scribe Disclosure  I, Ramsey Baker, am serving as a scribe to document services personally performed by Dr. Garrick Loyd, based on data collection and the provider's statements to me.     I spent a total of 30 min face to face with Eloise Johnson during today's office visit. About 50% of the time was spent counseling the patient and/or coordinating care regarding their allergy.

## 2019-03-04 NOTE — LETTER
3/4/2019       RE: Eloise Johnson  [address] [facility] [id number]     Dear Colleague,    Thank you for referring your patient, Eloise Johnson, to the [facility] DERMATOLOGY at [facility]. Please see a copy of my visit note below.    [facility] Dermatology Note    Dermatology Clinic  [facility]  [address] [place] [id number]    Dermatology Problem List:  1. Hx of wart right posterior upper arm, s/p biopsy 10/11/2018  2. Hx of eyelid dermatitis  -Previously seen at [facility]: treated with Alcometazone cream, Westcort ointment, protopic ointment --- all lost efficacy  -Concerned for ACD, biopsy 1/17/19 most c/w seborrheic dermatitis   3. Vitiligo: hx of eczimer laser, topical steroids, and protopic 0.1% ointment  4. Rash/eruption, right lateral canthus, s/p biopsy 1/17/2019 with subacute spongiotic dermatitis    Encounter Date: Mar 4, 2019    CC:  Chief Complaint   Patient presents with     Allergies     Leobardo is here for allergy consult       History of Present Illness:  Mr. Eloise Johnson is a 48 year old male who presents as a referral from Dr. Velázquez in regards to a patch testing consult. His previous visit with Dr. Velázquze was on 01/17/2019 where he received a punch biopsy for the facial rash/skin eruption around the right side of his eye. Biopsy showed subacute spongiotic dermatitis with no prominent Langerhans' cell collections or significant tissue eosinophilia. The patient has been unresponsive to topical steroids and protopic treatments in the past, but has not tried Elidel. He started ketoconazole shampoo and cream after receiving the biopsy results as it was read as more consistent with seborrheic dermatitis and has noticed some improvement.     Today he states the rash is much better but not gone. He still has some redness, scaling, and itching on and around his eyelids, on his  external ears, a patch on his back, and a patch on his right thigh.. He tries to remove the scale in the gym steam room but it quickly reaccumulates. The rash does not happen in the summer and is worse in the winter with lots of redness, scaling, and itchiness at night.     An allergy to gym towels or another product at his gym was considered because the rash started after he began going to the gym. He had no change in the rash after avoiding the gym for 1 week. He uses Head and Shoulders shampoo and Vanicream moisturizer on his face. Denies using hairspray or other products on his face and head. He used hydrocortisone on his right thigh without significant improvement in the past, so now only uses Vaseline in that area. Denies perfumes or essential oil diffusers in the home. 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 Hobbies include exercising at the gym, using the hot tub, and singing Karaoke.  He notices redness with a small amount of pressure when rubbing face with a towel or applying medications.   He has history of eczema a few years on his face that resolved with some cream. A few months ago (before holidays) he had several weeks of laser treatment on left and right cheek at [facility] in [place] for hypopigmentation/vitiligo.     No personal or family history of asthma or seasonal allergies.  No lip swelling.    He wonders if this is neurodermatitis. He is reluctant to pursue allergy testing because he heard that he would not be able to shower for a week.        Past Medical History:   Patient Active Problem List   Diagnosis     Mixed hyperlipidemia     Chronic fatigue     Cough     High myopia, both eyes     Family history of early CAD     HDL deficiency     Cardiomyopathy of undetermined type (H)     HUEY (obstructive sleep apnea)- moderate (AHI 21)     Acute medial meniscus tear of right knee      Nasal congestion     Past Medical History:   Diagnosis Date     Anxiety 05/01/2017    resolved 2018     Psychophysiological insomnia 05/01/2017    resolved 2018     Past Surgical History:   Procedure Laterality Date     COLONOSCOPY WITH CO2 INSUFFLATION N/A 10/24/2014    Procedure: COLONOSCOPY WITH CO2 INSUFFLATION;  Surgeon: Duane, William Charles, MD;  Location: [facility] OR     HC TOOTH EXTRACTION W/FORCEP         Social History:  Patient reports that he has quit smoking. His smoking use included cigarettes. He has a 2.00 pack-year smoking history. he has never used smokeless tobacco. He reports that he drinks about 0.5 - 1.0 oz of alcohol per week. He reports that he does not use drugs.    Family History:  Family History   Problem Relation Age of Onset     C.A.D. Father      Coronary Artery Disease Father      Eye Surgery Mother      Diabetes Maternal Grandmother      Colon Cancer No family hx of      Glaucoma No family hx of      Macular Degeneration No family hx of      Skin Cancer No family hx of        Medications:  Current Outpatient Medications   Medication Sig Dispense Refill     aspirin EC 81 MG tablet Take 1 tablet (81 mg) by mouth every other day       atorvastatin (LIPITOR) 10 MG tablet Take 1 tablet (10 mg) by mouth daily 90 tablet 3     ketoconazole (NIZORAL) 2 % external cream Apply up to twice daily to the face as needed. 60 g 11     Multiple Vitamin (MULTI-VITAMIN) per tablet Take 1 tablet by mouth daily.       omega-3 fatty acids 1200 MG capsule Take 2 capsules by mouth daily.       Cholecalciferol (VITAMIN D) 1000 UNITS capsule Take 1 capsule by mouth daily       ketoconazole (NIZORAL) 2 % external shampoo Lather small amount onto wet face/scalp, leave on 3-5 min, then rinse out. Use daily on the face, 2-3 times weekly on the scalp. (Patient not taking: Reported on 3/4/2019) 120 mL 11     mometasone (NASONEX) 50 MCG/ACT nasal spray Spray 2 sprays into both nostrils daily (Patient not taking: Reported  on 3/4/2019) 1 g 0     order for DME autoCPAP 5-18 cmH20 (Patient not taking: Reported on 3/4/2019) 1 Device 0     tobramycin-dexamethasone (TOBRADEX) 0.3-0.1 % ophthalmic ointment Apply to eyelids 3 x day for 1 week. (Patient not taking: Reported on 3/4/2019) 1 Tube 0       No Known Allergies    Review of Systems:  -As per HPI  -Otherwise nml state of health. No other skin concerns.    Physical exam:  Vitals: There were no vitals taken for this visit.  GEN: This is a well developed, well-nourished male in no acute distress, in a pleasant mood.    SKIN: Focused examination of the face, neck, and right thigh was performed.  -Erythematous, lichenified, white-scaled plaques on bilateral upper eyelids above eyebrows superiorly and laterally. Upper eyelid swelling noted in prior photos decreased today.    -Erythematous, lichenified, white scaled plaques on preauricular cheeks extending to external ears and into conchal bowl.  -Erythematous plaques with scattered erosions and linear red-brown crusting on bilateral nasolabial creases  -Circular, eczematous plaque on posterior neck/upper back and on right anterior thigh.  -No other lesions of concern on areas examined.        Order for PATCH TESTS    [x] Outpatient  [] Inpatient: Álvarez..../ Bed ....      Skin Atopy (atopic dermatitis) [x] Yes   [] No  Rhinitis/Sinusitis:   [] Yes   [x] No  Allergic Asthma:   [] Yes   [x] No  Food Allergy:   [] Yes   [x] No  Leg ulcers:   [] Yes   [x] No  Hand eczema:   [] Yes   [x] No   Leading hand:   [] R   [] L       [] Ambidextrous                        Reason for tests (suspected allergy): Distribution on face and neck concerning for reaction to Head & Shoulders shampoo, gym towelsor laundry detergent  Known previous allergies: None     Standardized panels  [x] Standard panel (40 tests)  [x] Preservatives & Antimicrobials (31 tests)  [x] Emulsifiers & Additives (25 tests)   [] Perfumes/Flavours & Plants (25 tests)  [] Hairdresser panel  (12 tests)  [] Rubber Chemicals (22 tests)  [] Plastics (26 tests)  [] Colorants/Dyes/Food additives (20 tests)  [] Metals (implants/dental) (23 tests)  [] Local anaesthetics/NSAIDs (13 tests)  [x] Antibiotics & Antimycotics (14 tests)   [] Corticosteroids (15 tests)   [] Photopatch test (62 tests)   [] others: ...      [] Patient's own products: ...    DO NOT test if chemical or biological identity is unknown!     always ask from patient the product information and safety sheets (MSDS)     [] Patient needs consultation with Allergy team (changes of tests may apply)  [] Tests discussed with Allergy team (can have direct appointment for patch tests)      Impression/Plan:    >>Subacute to chronic allergic contact dermatitis. Differential diagnosis includes atopic dermatitis.   Allergic contact dermatitis is more likely at this point given new onset, lack of atopic predisposition (no seasonal allergies or asthma), and distrubution.  Plan for patch testing to identify sensitivities or to rule out ACD if negative and treat as atopic dermatitis. Seborrheic dermatitis suggested in biopsy comments but is not consistent with location, degree of erythema, and lichenification seen on exam. The scale present is secondary to lichenification and is not greasy or yellow.      Recommended patch testing as above to work up ACD. Discussed that this is a necessary step in determining what products to avoid or to rule out ACD so that there is a clear diagnosis and treatment could then be targeted to atopic dermatitis. Discussed that he cannot get his back wet or shower during the week of patch testing, but he can carefully wash his hair and use a wash cloth on the rest of his body to clean. The patient elected to pursue patch testing and was scheduled for this.    Treatment  -Stop Head & Shoulders shampoo, switch to Vanicream brand Free and Clear shampoo. Continue Vanicream moisturizer.      Follow-up for patch testing at next  available appointment.       Staff Involved:  I, Teresa Guadalupe, MS4, saw and examined this patient in the presence of Dr. Loyd.  Staff Physician Comments:  I was present with the medical student who participated in the service and in the documentation of the note. I have verified the history and personally performed the physical exam and medical decision making. I agree with the assessment and plan as documented in the note. I have reviewed and if necessary amended the note.      Garrick Loyd MD  Professor  Head of Dermato-Allergy Division  Department of Dermatology  [facility]    Scribe Disclosure  I, Ramsey Baker, am serving as a scribe to document services personally performed by Dr. Garrick Loyd, based on data collection and the provider's statements to me.     I spent a total of 30 min face to face with Eloise Johnson during today's office visit. About 50% of the time was spent counseling the patient and/or coordinating care regarding their allergy.

## 2019-03-04 NOTE — PATIENT INSTRUCTIONS
The rash is either allergic contact dermatitis or atopic/neurodermatitis. Seborrheic dermatitis (fungal infection) is less likely.    The patch on your neck and thigh look like atopic dermatitis.     Try Vanicream Free and Clear Shampoo instead of Head & Shoulders. Continue using Vanicream lotions or creams.    We recommend allergy testing so that you know what products to avoid. You can wash your hair and use a towel to wash your body during allergy testing as long as you do not wash or wet your back.    Patch Testing Information  What are allergen patch tests?    The test is done to look for skin allergies that may be causing rashes and irritation.    A patch test is a way of identifying whether a substance has caused a delayed reaction with skin inflammation, such as contact eczema or delayed (after days) reactions to drugs.     We will use various types of test compounds, which may include common allergens you may come in contact with in daily life such as preservatives, fragrances or even drugs.     Most of the time we will use standardized, prefabricated test solutions. The choice of the substances and series tested will depend on your history of reactions. Sometimes we will test your own products as well.     In order to avoid severe toxic reactions we need detailed information or safety sheets for each of the test compounds.    The test panels are set up with a small amount of common substances that cause skin allergies. They are taped to your skin with a clear hypoallergenic bandage and reinforced hypoallergenic tape.    The substances are numbered, so it is easy to tell what is causing a skin reaction.  What do I need to do in preparation for the test?    Stop all systemic steroids 1 month prior to the testing.     Stop applying topical steroids to the test area one week prior to the test. It is to use them elsewhere throughout the testing process. If this is not possible then discuss options with the  Allergy specialist.    Do not go sunbathing or tanning for one week prior to testing    It is okay to take antihistamines as normal.     Please wear dark colors the week of the test since we will write on you with a dark marker that may transfer and stain clothing or bedding.     Some medications can affect the reactions to allergens during the tests. Therefore reveal all the medications you take to the allergy team. The doctor will discuss the medications with you before the tests.     Eat how you normally would.    Avoid the following:    You cannot get the test area wet during the entire test period. This means no bathing or swimming the entire test period.    No strenuous exercise that may cause sweating.    Do not scratch the test area, this can cause the allergen to spread and give us false positives.     Avoid exposure to UV irradiation. This means no tanning or UV treatment during the testing period.   What can I expect?    Patch testing is done over three appointments.   o The usual schedule is: Monday (Allergen patches are placed), Wednesday (Patches are removed and skin is examined by the MD), and Friday (Skin is examined by the MD)      If you are allergic, there will be an area of irritation where the test was placed.    Itching or burning at the test site might happen if you are allergic to the allergen.  Do not rub or scratch the test site since this may spread the allergen and possibly cause false positives. If itching or burning is not tolerable please contact the clinic.    The marker we draw on your back with ma take up to 5 weeks to wash off completely. Rubbing alcohol can help speed up this process.     Reactions can occur 1 to 2 weeks after the tests are applied. If this happens please take photos of the area and contact the clinic.  What should I do after the tests are placed?    Keep the area dry. No showering or getting the test area wet from the time we see you at your first visit until  after your third appointment. If you get the test area wet you are washing off the test and we could get false negative reactions.    If you notice any of the test patches coming loose put on more tape to re-secure the test.    If the marker that is applied fades you can use a dark pen to autumn around the panel sites.    Cover the test area when you are outside to avoid any sun exposure while the test is in place.     You can remove the tests only if there is a severe reaction (itch, pain, blistering). Please report this to your doctor immediately. If you have to remove the tests please autumn the locations of each test field with a grid so we can identify the allergen.  WHAT ARE THE POSSIBLE RISKS OF PATCH TESTS     If you are allergic to a compound tested you will develop a localized skin reaction similar to your previous reaction, this may take days to develop. These reactions include a formation of red, itchy skin lesions that could be about a centimeter with small vesicles or possibly even blisters. The lesions will fade over time, this may take weeks. The test might leave some skin pigmentation for a few months.     In rare cases a localized reaction to patch testing can become generalized.     The tests with your own products might have some risks because they are not standardized and unanticipated reactions could occur. We need as much information as possible to evaluate if your own products are safe to test and under what conditions. This has to be evaluated for each individual product.   Useful Contact Information    To contact your doctor you can either send a Novint message or call [phone]     Address  o [address] [place] [address]    If you develop any serious or adverse allergic reaction after office hours please seek immediate medical assistance at the nearest clinic, urgent care, or emergency room.

## 2019-03-14 ENCOUNTER — DOCUMENTATION ONLY (OUTPATIENT)
Dept: DERMATOLOGY | Facility: CLINIC | Age: 49
End: 2019-03-14

## 2019-03-14 NOTE — PROGRESS NOTES
Date/time of application:03/18/2019  Physician/Nurse:  / Rowena Serrato, [facility]               Localization of application: Back  STANDARD Series         No Substance 2 days 4 days remarks   1 Jeremías Mix [C] - -    2 Colophony - -    3  2-Mercaptobenzothiazole  - -     4 Methylisothiazolinone - -    5 Carba Mix - -    6 Thiuram Mix [A] - -    7 Bisphenol A Epoxy Resin - -    8 A-Bldo-Hetnvhpeaxt-Formaldehyde Resin - -    9 Mercapto Mix [A] - -    10 Black Rubber Mix- PPD [B] - -    11 Potassium Dichromate  -  -    12 Balsam of [place] (Myroxylon Pereirae Resin) - -    13 Nickel Sulphate Hexahydrate - -    14 Mixed Dialkyl Thiourea - -    15 Paraben Mix [B] - -    16 Methyldibromo Glutaronitrile - -    17 Fragrance Mix - -    18 2-Bromo-2-Nitropropane-1,3-Diol (Bronopol) - -    19 Lyral - -    20 Tixocortol-21- Pivalate - -    21 Diazolidiyl Urea (Germall II) - -    22 Methyl Methacrylate - -    23 Cobalt (II) Chloride Hexahydrate - -    24 Fragrance Mix II  - -    25 Compositae Mix - -    26 Benzoyl Peroxide - -    27 Bacitracin - -    28 Formaldehyde - -    29 Methylchloroisothiazolinone / Methylisothiazolinone - -    30 Corticosteroid Mix - -    31 Sodium Lauryl Sulfate - -    32 Lanolin Alcohol - -    33 Turpentine - -    34 Cetylstearylalcohol - -    35 Chlorhexidine Dicluconate - -    36 Budenoside - -    37 Imidazolidinyl Urea  - -    38 Ethyl-2 Cyanoacrylate - -    39 Quaternium 15 (Dowicil 200) - -    40 Decyl Glucoside - -      EMULSIFIERS & ADDITIVES        No Substance 2 days 4 days remarks   41 Polyethylene Glycol-400 - -    42 Cocamidopropyl Betaine - -    43 Amerchol L101 - -    44 Propylene Glycol - -    45 Triethanolamine - -    46 Sorbitane Sesquiolate - -    47 Isopropylmyristate - -    48 Polysorbate 80  - -    49 Amidoamine   (Stearamidopropyl Dimethylamine) - -    50 Oleamidopropyl Dimethylamine - -    51 Lauryl Glucoside - -    52 Coconut Diethanolamide  - -    53 2-Hydroxy-4-Methoxy  Benzophenone (Oxybenzone) - -    54 Benzophenone-4 (Sulisobenzon) - -    55 Propolis - -    56 Dexpanthenol - -    57 Carboxymethyl Cellulose Sodium - -    58 Abitol - -    59 Tert-Butylhydroquinone - -    60 Benzyl Salicylate - -     Antioxidant      61 Dodecyl Gallate - -    62 Butylhydroxyanisole (BHA) - -    63 Butylhydroxytoluene (BHT) - -    64 Di-Alpha-Tocopherol (Vit E) - -    65 Propyl Gallate - -      PRESERVATIVES & ANTIMICROBIALS         No Substance 2 days 4 days remarks   66  1,2-Benzisothiazoline-3-One, Sodium Salt - -    67  1,3,5-Javier (2-Hydroxyethyl) - Hexahydrotriazine (Grotan BK) - -    68 3-Idinwipmjhgbe-0-Nitro-1, 3-Propanediol - -    69  3, 4, 4' - Triclocarban - -    70 4 - Chloro - 3 - Cresol - -    71 4 - Chloro - 4 - Xylenol (PCMX) - -    72 7-Ethylbicyclooxazolidine (Liquid Gridsan CP5069) - -    73 Benzalkonium Chloride - -    74 Benzyl Alcohol - -    75 Cetalkonium Chloride - -    76 Cetylpyrimidine Chloride  - -    77 Chloroacetamide - -    78 DMDM Hydantoin - -    79 Glutaraldehyde - -    80 Triclosan - -    81 Glyoxal Trimeric Dihydrate - -    82 Iodopropynyl Butylcarbamate - -    83 Octylisothiazoline - -    84 Iodoform - -    85 (Nitrobutyl) Morpholine/(Ethylnitro-Trimethylene) Dimorpholine (Liquid Gridsan P 1487) - -    86 Phenoxyethanol - -    87 Phenyl Salicylate - -    88 Povidone Iodine - -    89 Sodium Benzoate - -    90 Sodium Disulfite - -    91 Sorbic Acid - -    92 Thimerosal - -     Parabens      93 Butyl-P-Hydroxybenzoate - -    94 Ethyl-P-Hydroxybenzoate - -    95 Methyl-P-Hydroxybenzoate - -    96 Propyl-P-Hydroxybenzoate - -      ANTIBIOTICS & ANTIMYCOTICS         No Substance 2 days 4 days remarks   97 Erythromycine - -    98 Framycetine Sulphate - -    99 Fusidic Acid Sodium Salt - -    100 Gentamicin Sulphate - -    101 Neomycine Sulphate - -    102 Oxytetracycline  - -    103 Polymyxin B Sulphate - -    104 Tetracycline-HCL - -    105 Sulfanilamide - -    106 Metronidazole - -     107 Oxyquinoline Mix - -    108 Nitrofurazone - -    109 Nystatin - -    110 Clotrimazole - -          Results of patch tests:                         Interpretation:    - Negative                    A    = Allergic      (+) Erythema    TI   = Toxic/irritant   + E + Infiltration    RaP = Relevance at Present     ++ E/I + Papulovesicle   Rpr  = Relevance Previously     +++ E/I/P + Blister     nR   = No Relevance       reviewed by Maria Esther Lopez LPN

## 2019-03-18 ENCOUNTER — OFFICE VISIT (OUTPATIENT)
Dept: DERMATOLOGY | Facility: CLINIC | Age: 49
End: 2019-03-18
Payer: COMMERCIAL

## 2019-03-18 DIAGNOSIS — L23.89 ALLERGIC CONTACT DERMATITIS DUE TO OTHER AGENTS: Primary | ICD-10-CM

## 2019-03-18 ASSESSMENT — PAIN SCALES - GENERAL: PAINLEVEL: NO PAIN (0)

## 2019-03-18 NOTE — NURSING NOTE
Dermatology Rooming Note    Eloise He's goals for this visit include:   Chief Complaint   Patient presents with     Allergies     Leobardo is here for patch tetsing day      Rowena Serrato, CMA

## 2019-03-18 NOTE — PROGRESS NOTES
[facility] Dermatology Note    Dermatology Clinic  Formerly [facility]  Clinics and [facility] Center  [address] [place] [id number]    Dermatology Problem List:  1. Hx of wart right posterior upper arm, s/p biopsy 10/11/2018  2. Hx of eyelid dermatitis  -Previously seen at [facility]: treated with Alcometazone cream, Westcort ointment, protopic ointment --- all lost efficacy  -Concerned for ACD, biopsy 1/17/19 most c/w seborrheic dermatitis   3. Vitiligo: hx of eczimer laser, topical steroids, and protopic 0.1% ointment  4. Rash/eruption, right lateral canthus, s/p biopsy 1/17/2019 with subacute spongiotic dermatitis    Encounter Date: Mar 18, 2019    CC:  Chief Complaint   Patient presents with     Allergies     Leobardo is here for patch tetsing day        History of Present Illness:  Mr. Navas He is a 49 year old male who presents as a referral from Dr. Velázquez in regards to a patch testing consult. His previous visit with Dr. Velázquez was on 01/17/2019 where he received a punch biopsy for the facial rash/skin eruption around the right side of his eye. Biopsy showed subacute spongiotic dermatitis with no prominent Langerhans' cell collections or significant tissue eosinophilia. The patient has been unresponsive to topical steroids and protopic treatments in the past, but has not tried Elidel. He started ketoconazole shampoo and cream after receiving the biopsy results as it was read as more consistent with seborrheic dermatitis and has noticed some improvement.     Today he states the rash is much better but not gone. He still has some redness, scaling, and itching on and around his eyelids, on his external ears, a patch on his back, and a patch on his right thigh.. He tries to remove the scale in the gym steam room but it quickly reaccumulates. The rash does not happen in the summer and is worse in the winter with lots of redness, scaling, and itchiness at  night.     An allergy to gym towels or another product at his gym was considered because the rash started after he began going to the gym. He had no change in the rash after avoiding the gym for 1 week. He uses Head and Shoulders shampoo and Vanicream moisturizer on his face. Denies using hairspray or other products on his face and head. He used hydrocortisone on his right thigh without significant improvement in the past, so now only uses Vaseline in that area. Denies perfumes or essential oil diffusers in the home. 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 Hobbies include exercising at the gym, using the hot tub, and singing Karaoke.  He notices redness with a small amount of pressure when rubbing face with a towel or applying medications.   He has history of eczema a few years on his face that resolved with some cream. A few months ago (before holidays) he had several weeks of laser treatment on left and right cheek at [facility] in [place] for hypopigmentation/vitiligo.     No personal or family history of asthma or seasonal allergies.  No lip swelling.    He wonders if this is neurodermatitis. He is reluctant to pursue allergy testing because he heard that he would not be able to shower for a week.        Past Medical History:   Patient Active Problem List   Diagnosis     Mixed hyperlipidemia     Chronic fatigue     Cough     High myopia, both eyes     Family history of early CAD     HDL deficiency     Cardiomyopathy of undetermined type (H)     HUEY (obstructive sleep apnea)- moderate (AHI 21)     Acute medial meniscus tear of right knee     Nasal congestion     Past Medical History:   Diagnosis Date     Anxiety 05/01/2017    resolved 2018     Psychophysiological insomnia 05/01/2017    resolved 2018     Past Surgical History:   Procedure Laterality Date     COLONOSCOPY WITH CO2 INSUFFLATION N/A  10/24/2014    Procedure: COLONOSCOPY WITH CO2 INSUFFLATION;  Surgeon: Duane, William Charles, MD;  Location: [facility] OR     HC TOOTH EXTRACTION W/FORCEP         Social History:  Patient reports that he has quit smoking. His smoking use included cigarettes. He has a 2.00 pack-year smoking history. he has never used smokeless tobacco. He reports that he drinks about 0.5 - 1.0 oz of alcohol per week. He reports that he does not use drugs.    Family History:  Family History   Problem Relation Age of Onset     C.A.D. Father      Coronary Artery Disease Father      Eye Surgery Mother      Diabetes Maternal Grandmother      Colon Cancer No family hx of      Glaucoma No family hx of      Macular Degeneration No family hx of      Skin Cancer No family hx of        Medications:  Current Outpatient Medications   Medication Sig Dispense Refill     aspirin EC 81 MG tablet Take 1 tablet (81 mg) by mouth every other day       atorvastatin (LIPITOR) 10 MG tablet Take 1 tablet (10 mg) by mouth daily 90 tablet 3     Cholecalciferol (VITAMIN D) 1000 UNITS capsule Take 1 capsule by mouth daily       ketoconazole (NIZORAL) 2 % external cream Apply up to twice daily to the face as needed. 60 g 11     ketoconazole (NIZORAL) 2 % external shampoo Lather small amount onto wet face/scalp, leave on 3-5 min, then rinse out. Use daily on the face, 2-3 times weekly on the scalp. (Patient not taking: Reported on 3/4/2019) 120 mL 11     mometasone (NASONEX) 50 MCG/ACT nasal spray Spray 2 sprays into both nostrils daily (Patient not taking: Reported on 3/4/2019) 1 g 0     Multiple Vitamin (MULTI-VITAMIN) per tablet Take 1 tablet by mouth daily.       omega-3 fatty acids 1200 MG capsule Take 2 capsules by mouth daily.       order for DME autoCPAP 5-18 cmH20 (Patient not taking: Reported on 3/4/2019) 1 Device 0     tobramycin-dexamethasone (TOBRADEX) 0.3-0.1 % ophthalmic ointment Apply to eyelids 3 x day for 1 week. (Patient not taking: Reported on  3/4/2019) 1 Tube 0       No Known Allergies    Review of Systems:  -As per HPI  -Otherwise nml state of health. No other skin concerns.    Physical exam:  Vitals: There were no vitals taken for this visit.  GEN: This is a well developed, well-nourished male in no acute distress, in a pleasant mood.    SKIN: Focused examination of the face, neck, and right thigh was performed.  -Erythematous, lichenified, white-scaled plaques on bilateral upper eyelids above eyebrows superiorly and laterally. Upper eyelid swelling noted in prior photos decreased today.    -Erythematous, lichenified, white scaled plaques on preauricular cheeks extending to external ears and into conchal bowl.  -Erythematous plaques with scattered erosions and linear red-brown crusting on bilateral nasolabial creases  -Circular, eczematous plaque on posterior neck/upper back and on right anterior thigh.  -No other lesions of concern on areas examined.        Order for PATCH TESTS    [x] Outpatient  [] Inpatient: Álvarez..../ Bed ....      Skin Atopy (atopic dermatitis) [x] Yes   [] No  Rhinitis/Sinusitis:   [] Yes   [x] No  Allergic Asthma:   [] Yes   [x] No  Food Allergy:   [] Yes   [x] No  Leg ulcers:   [] Yes   [x] No  Hand eczema:   [] Yes   [x] No   Leading hand:   [] R   [] L       [] Ambidextrous                        Reason for tests (suspected allergy): Distribution on face and neck concerning for reaction to Head & Shoulders shampoo, gym towelsor laundry detergent  Known previous allergies: None     Standardized panels  [x] Standard panel (40 tests)  [x] Preservatives & Antimicrobials (31 tests)  [x] Emulsifiers & Additives (25 tests)   [] Perfumes/Flavours & Plants (25 tests)  [] Hairdresser panel (12 tests)  [] Rubber Chemicals (22 tests)  [] Plastics (26 tests)  [] Colorants/Dyes/Food additives (20 tests)  [] Metals (implants/dental) (23 tests)  [] Local anaesthetics/NSAIDs (13 tests)  [x] Antibiotics & Antimycotics (14 tests)   []  Corticosteroids (15 tests)   [] Photopatch test (62 tests)   [] others: ...      [] Patient's own products: ...    DO NOT test if chemical or biological identity is unknown!     always ask from patient the product information and safety sheets (MSDS)     [] Patient needs consultation with Allergy team (changes of tests may apply)  [] Tests discussed with Allergy team (can have direct appointment for patch tests)    RESULTS & EVALUATION of PATCH TESTS    Patch test readings after     [x] 2 days, [] 3 days [x] 4 days, [] 5 days,    Applied patch tests with results (import here the list of patch tests):  Date/time of application:03/18/2019  Physician/Nurse:  / Rowena Serrato, [facility]               Localization of application: Back  STANDARD Series         No Substance 2 days 4 days remarks   1 Jeremías Mix [C] - -    2 Colophony - -    3  2-Mercaptobenzothiazole  - -     4 Methylisothiazolinone - -    5 Carba Mix - -    6 Thiuram Mix [A] - -    7 Bisphenol A Epoxy Resin - -    8 X-Xlnj-Xxcogibeopr-Formaldehyde Resin - -    9 Mercapto Mix [A] - -    10 Black Rubber Mix- PPD [B] - -    11 Potassium Dichromate  -  -    12 Balsam of [place] (Myroxylon Pereirae Resin) - -    13 Nickel Sulphate Hexahydrate - -    14 Mixed Dialkyl Thiourea - -    15 Paraben Mix [B] - -    16 Methyldibromo Glutaronitrile - -    17 Fragrance Mix - -    18 2-Bromo-2-Nitropropane-1,3-Diol (Bronopol) - -    19 Lyral - -    20 Tixocortol-21- Pivalate - -    21 Diazolidiyl Urea (Germall II) - -    22 Methyl Methacrylate - -    23 Cobalt (II) Chloride Hexahydrate - -    24 Fragrance Mix II  - -    25 Compositae Mix - -    26 Benzoyl Peroxide - -    27 Bacitracin - -    28 Formaldehyde - -    29 Methylchloroisothiazolinone / Methylisothiazolinone - -    30 Corticosteroid Mix - -    31 Sodium Lauryl Sulfate - -    32 Lanolin Alcohol - -    33 Turpentine - -    34 Cetylstearylalcohol - -    35 Chlorhexidine Dicluconate - -    36 Budenoside - -    37  Imidazolidinyl Urea  - -    38 Ethyl-2 Cyanoacrylate - -    39 Quaternium 15 (Dowicil 200) - -    40 Decyl Glucoside - -      EMULSIFIERS & ADDITIVES        No Substance 2 days 4 days remarks   41 Polyethylene Glycol-400 - -    42 Cocamidopropyl Betaine - -    43 Amerchol L101 - -    44 Propylene Glycol - -    45 Triethanolamine - -    46 Sorbitane Sesquiolate - -    47 Isopropylmyristate - -    48 Polysorbate 80  - -    49 Amidoamine   (Stearamidopropyl Dimethylamine) - -    50 Oleamidopropyl Dimethylamine - -    51 Lauryl Glucoside - -    52 Coconut Diethanolamide  - -    53 2-Hydroxy-4-Methoxy Benzophenone (Oxybenzone) - -    54 Benzophenone-4 (Sulisobenzon) - -    55 Propolis - -    56 Dexpanthenol - -    57 Carboxymethyl Cellulose Sodium - -    58 Abitol - -    59 Tert-Butylhydroquinone - -    60 Benzyl Salicylate - -     Antioxidant      61 Dodecyl Gallate - -    62 Butylhydroxyanisole (BHA) - -    63 Butylhydroxytoluene (BHT) - -    64 Di-Alpha-Tocopherol (Vit E) - -    65 Propyl Gallate - -      PRESERVATIVES & ANTIMICROBIALS         No Substance 2 days 4 days remarks   66  1,2-Benzisothiazoline-3-One, Sodium Salt - -    67  1,3,5-Javier (2-Hydroxyethyl) - Hexahydrotriazine (Grotan BK) - -    68 7-Czfkhqigsmust-1-Nitro-1, 3-Propanediol - -    69  3, 4, 4' - Triclocarban - -    70 4 - Chloro - 3 - Cresol - -    71 4 - Chloro - 4 - Xylenol (PCMX) - -    72 7-Ethylbicyclooxazolidine (Bioban IC9427) - -    73 Benzalkonium Chloride - -    74 Benzyl Alcohol - -    75 Cetalkonium Chloride - -    76 Cetylpyrimidine Chloride  - -    77 Chloroacetamide - -    78 DMDM Hydantoin - -    79 Glutaraldehyde - -    80 Triclosan - -    81 Glyoxal Trimeric Dihydrate - -    82 Iodopropynyl Butylcarbamate - -    83 Octylisothiazoline - -    84 Iodoform - -    85 (Nitrobutyl) Morpholine/(Ethylnitro-Trimethylene) Dimorpholine ([facility] P 1487) - -    86 Phenoxyethanol - -    87 Phenyl Salicylate - -    88 Povidone Iodine - -    89  Sodium Benzoate - -    90 Sodium Disulfite - -    91 Sorbic Acid - -    92 Thimerosal - -     Parabens      93 Butyl-P-Hydroxybenzoate - -    94 Ethyl-P-Hydroxybenzoate - -    95 Methyl-P-Hydroxybenzoate - -    96 Propyl-P-Hydroxybenzoate - -      ANTIBIOTICS & ANTIMYCOTICS         No Substance 2 days 4 days remarks   97 Erythromycine - -    98 Framycetine Sulphate - -    99 Fusidic Acid Sodium Salt - -    100 Gentamicin Sulphate - -    101 Neomycine Sulphate - -    102 Oxytetracycline  - -    103 Polymyxin B Sulphate - -    104 Tetracycline-HCL - -    105 Sulfanilamide - -    106 Metronidazole - -    107 Oxyquinoline Mix - -    108 Nitrofurazone - -    109 Nystatin - -    110 Clotrimazole - -      Results of patch tests:                         Interpretation:    - Negative                    A    = Allergic      (+) Erythema    TI   = Toxic/irritant   + E + Infiltration    RaP = Relevance at Present     ++ E/I + Papulovesicle   Rpr  = Relevance Previously     +++ E/I/P + Blister     nR   = No Relevance    [] No relevant allergic reaction observed    [] Allergic reaction diagnosed against: see later      Interpretation/ remarks:   See later    [] Patient information given   [] [facility] information   [] [employer] information    ==> final Diagnosis:  >>Subacute to chronic allergic contact dermatitis. Differential diagnosis includes atopic dermatitis.   Allergic contact dermatitis is more likely at this point given new onset, lack of atopic predisposition (no seasonal allergies or asthma), and distrubution.  Plan for patch testing to identify sensitivities or to rule out ACD if negative and treat as atopic dermatitis. Seborrheic dermatitis suggested in biopsy comments but is not consistent with location, degree of erythema, and lichenification seen on exam. The scale present is secondary to lichenification and is not greasy or yellow.      These conclusions are made at the best of ones knowledge and belief  based on  the provided evidence such as patients history and allergy test results and they can change over time or can be incomplete because of missing informations.    ==> Treatment prescribed/Plan:  Treatment  -Stop Head & Shoulders shampoo, switch to Vanicream brand Free and Clear shampoo. Continue Vanicream moisturizer.

## 2019-03-19 NOTE — PROGRESS NOTES
Patient had 110 patch tests placed this visit.    Standard panel (40 tests)    Preservatives & Antimicrobials (31 tests)    Emulsifiers & Additives (25 tests)     Antibiotics & Antimycotics (14 tests)

## 2019-03-20 ENCOUNTER — OFFICE VISIT (OUTPATIENT)
Dept: DERMATOLOGY | Facility: CLINIC | Age: 49
End: 2019-03-20
Payer: COMMERCIAL

## 2019-03-20 DIAGNOSIS — L23.89 ALLERGIC CONTACT DERMATITIS DUE TO OTHER AGENTS: Primary | ICD-10-CM

## 2019-03-20 ASSESSMENT — PAIN SCALES - GENERAL: PAINLEVEL: NO PAIN (0)

## 2019-03-20 NOTE — NURSING NOTE
Dermatology Rooming Note    Eloise He's goals for this visit include:   Chief Complaint   Patient presents with     Allergies     Leobardo is here for allergy testing day 3     Rowena Serrato, CMA

## 2019-03-20 NOTE — PROGRESS NOTES
[facility] Dermatology Note    Dermatology Clinic  [facility]  [address] [place] [id number]    Dermatology Problem List:  1. Hx of wart right posterior upper arm, s/p biopsy 10/11/2018  2. Hx of eyelid dermatitis  -Previously seen at [facility]: treated with Alcometazone cream, Westcort ointment, protopic ointment --- all lost efficacy  -Concerned for ACD, biopsy 1/17/19 most c/w seborrheic dermatitis   3. Vitiligo: hx of eczimer laser, topical steroids, and protopic 0.1% ointment  4. Rash/eruption, right lateral canthus, s/p biopsy 1/17/2019 with subacute spongiotic dermatitis    Encounter Date: Mar 20, 2019    CC:  Chief Complaint   Patient presents with     Allergies     Leobardo is here for allergy testing day 3       History of Present Illness:  Mr. Navas He is a 49 year old male who presents as a referral from Dr. Velázquez in regards to a patch testing consult. His previous visit with Dr. Velázquez was on 01/17/2019 where he received a punch biopsy for the facial rash/skin eruption around the right side of his eye. Biopsy showed subacute spongiotic dermatitis with no prominent Langerhans' cell collections or significant tissue eosinophilia. The patient has been unresponsive to topical steroids and protopic treatments in the past, but has not tried Elidel. He started ketoconazole shampoo and cream after receiving the biopsy results as it was read as more consistent with seborrheic dermatitis and has noticed some improvement.     Today he states the rash is much better but not gone. He still has some redness, scaling, and itching on and around his eyelids, on his external ears, a patch on his back, and a patch on his right thigh.. He tries to remove the scale in the gym steam room but it quickly reaccumulates. The rash does not happen in the summer and is worse in the winter with lots of redness, scaling, and itchiness at  night.     An allergy to gym towels or another product at his gym was considered because the rash started after he began going to the gym. He had no change in the rash after avoiding the gym for 1 week. He uses Head and Shoulders shampoo and Vanicream moisturizer on his face. Denies using hairspray or other products on his face and head. He used hydrocortisone on his right thigh without significant improvement in the past, so now only uses Vaseline in that area. Denies perfumes or essential oil diffusers in the home. 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 Hobbies include exercising at the gym, using the hot tub, and singing Karaoke.  He notices redness with a small amount of pressure when rubbing face with a towel or applying medications.   He has history of eczema a few years on his face that resolved with some cream. A few months ago (before holidays) he had several weeks of laser treatment on left and right cheek at [facility] in [place] for hypopigmentation/vitiligo.     No personal or family history of asthma or seasonal allergies.  No lip swelling.    He wonders if this is neurodermatitis. He is reluctant to pursue allergy testing because he heard that he would not be able to shower for a week.        Past Medical History:   Patient Active Problem List   Diagnosis     Mixed hyperlipidemia     Chronic fatigue     Cough     High myopia, both eyes     Family history of early CAD     HDL deficiency     Cardiomyopathy of undetermined type (H)     HUEY (obstructive sleep apnea)- moderate (AHI 21)     Acute medial meniscus tear of right knee     Nasal congestion     Past Medical History:   Diagnosis Date     Anxiety 05/01/2017    resolved 2018     Psychophysiological insomnia 05/01/2017    resolved 2018     Past Surgical History:   Procedure Laterality Date     COLONOSCOPY WITH CO2 INSUFFLATION N/A  10/24/2014    Procedure: COLONOSCOPY WITH CO2 INSUFFLATION;  Surgeon: Duane, William Charles, MD;  Location: [facility] OR     HC TOOTH EXTRACTION W/FORCEP         Social History:  Patient reports that he has quit smoking. His smoking use included cigarettes. He has a 2.00 pack-year smoking history. he has never used smokeless tobacco. He reports that he drinks about 0.5 - 1.0 oz of alcohol per week. He reports that he does not use drugs.    Family History:  Family History   Problem Relation Age of Onset     C.A.D. Father      Coronary Artery Disease Father      Eye Surgery Mother      Diabetes Maternal Grandmother      Colon Cancer No family hx of      Glaucoma No family hx of      Macular Degeneration No family hx of      Skin Cancer No family hx of        Medications:  Current Outpatient Medications   Medication Sig Dispense Refill     aspirin EC 81 MG tablet Take 1 tablet (81 mg) by mouth every other day       atorvastatin (LIPITOR) 10 MG tablet Take 1 tablet (10 mg) by mouth daily 90 tablet 3     Cholecalciferol (VITAMIN D) 1000 UNITS capsule Take 1 capsule by mouth daily       ketoconazole (NIZORAL) 2 % external cream Apply up to twice daily to the face as needed. 60 g 11     ketoconazole (NIZORAL) 2 % external shampoo Lather small amount onto wet face/scalp, leave on 3-5 min, then rinse out. Use daily on the face, 2-3 times weekly on the scalp. 120 mL 11     mometasone (NASONEX) 50 MCG/ACT nasal spray Spray 2 sprays into both nostrils daily 1 g 0     Multiple Vitamin (MULTI-VITAMIN) per tablet Take 1 tablet by mouth daily.       omega-3 fatty acids 1200 MG capsule Take 2 capsules by mouth daily.       order for DME autoCPAP 5-18 cmH20 1 Device 0     tobramycin-dexamethasone (TOBRADEX) 0.3-0.1 % ophthalmic ointment Apply to eyelids 3 x day for 1 week. 1 Tube 0       No Known Allergies    Review of Systems:  -As per HPI  -Otherwise nml state of health. No other skin concerns.    Physical exam:  Vitals: There were no  vitals taken for this visit.  GEN: This is a well developed, well-nourished male in no acute distress, in a pleasant mood.    SKIN: Focused examination of the face, neck, and right thigh was performed.  -Erythematous, lichenified, white-scaled plaques on bilateral upper eyelids above eyebrows superiorly and laterally. Upper eyelid swelling noted in prior photos decreased today.    -Erythematous, lichenified, white scaled plaques on preauricular cheeks extending to external ears and into conchal bowl.  -Erythematous plaques with scattered erosions and linear red-brown crusting on bilateral nasolabial creases  -Circular, eczematous plaque on posterior neck/upper back and on right anterior thigh.  -No other lesions of concern on areas examined.        Order for PATCH TESTS    [x] Outpatient  [] Inpatient: Álvarez..../ Bed ....      Skin Atopy (atopic dermatitis) [x] Yes   [] No  Rhinitis/Sinusitis:   [] Yes   [x] No  Allergic Asthma:   [] Yes   [x] No  Food Allergy:   [] Yes   [x] No  Leg ulcers:   [] Yes   [x] No  Hand eczema:   [] Yes   [x] No   Leading hand:   [] R   [] L       [] Ambidextrous                        Reason for tests (suspected allergy): Distribution on face and neck concerning for reaction to Head & Shoulders shampoo, gym towelsor laundry detergent  Known previous allergies: None     Standardized panels  [x] Standard panel (40 tests)  [x] Preservatives & Antimicrobials (31 tests)  [x] Emulsifiers & Additives (25 tests)   [] Perfumes/Flavours & Plants (25 tests)  [] Hairdresser panel (12 tests)  [] Rubber Chemicals (22 tests)  [] Plastics (26 tests)  [] Colorants/Dyes/Food additives (20 tests)  [] Metals (implants/dental) (23 tests)  [] Local anaesthetics/NSAIDs (13 tests)  [x] Antibiotics & Antimycotics (14 tests)   [] Corticosteroids (15 tests)   [] Photopatch test (62 tests)   [] others: ...      [] Patient's own products: ...    DO NOT test if chemical or biological identity is unknown!     always  ask from patient the product information and safety sheets (MSDS)     [] Patient needs consultation with Allergy team (changes of tests may apply)  [] Tests discussed with Allergy team (can have direct appointment for patch tests)    RESULTS & EVALUATION of PATCH TESTS    Patch test readings after     [x] 2 days, [] 3 days [x] 4 days, [] 5 days,    Applied patch tests with results (import here the list of patch tests):  Date/time of application:03/18/2019  Physician/Nurse:  / Rowena Serrato, [facility]               Localization of application: Back  STANDARD Series         No Substance 2 days 4 days remarks   1 Jeremías Mix [C] - -    2 Colophony - -    3  2-Mercaptobenzothiazole  - -     4 Methylisothiazolinone - -    5 Carba Mix - -    6 Thiuram Mix [A] - -    7 Bisphenol A Epoxy Resin - -    8 M-Kphv-Geiuilzlasc-Formaldehyde Resin - -    9 Mercapto Mix [A] - -    10 Black Rubber Mix- PPD [B] - -    11 Potassium Dichromate  (+)  -    12 Balsam of [place] (Myroxylon Pereirae Resin) - -    13 Nickel Sulphate Hexahydrate - -    14 Mixed Dialkyl Thiourea - -    15 Paraben Mix [B] - -    16 Methyldibromo Glutaronitrile (+) -    17 Fragrance Mix - -    18 2-Bromo-2-Nitropropane-1,3-Diol (Bronopol) - -    19 Lyral - -    20 Tixocortol-21- Pivalate - -    21 Diazolidiyl Urea (Germall II) - -    22 Methyl Methacrylate - -    23 Cobalt (II) Chloride Hexahydrate - -    24 Fragrance Mix II  - -    25 Compositae Mix - -    26 Benzoyl Peroxide - -    27 Bacitracin - -    28 Formaldehyde - -    29 Methylchloroisothiazolinone / Methylisothiazolinone - -    30 Corticosteroid Mix - -    31 Sodium Lauryl Sulfate - -    32 Lanolin Alcohol - -    33 Turpentine (+) -    34 Cetylstearylalcohol - -    35 Chlorhexidine Dicluconate - -    36 Budenoside - -    37 Imidazolidinyl Urea  - -    38 Ethyl-2 Cyanoacrylate - -    39 Quaternium 15 (Dowicil 200) - -    40 Decyl Glucoside - -      EMULSIFIERS & ADDITIVES        No Substance 2 days 4  days remarks   41 Polyethylene Glycol-400 - -    42 Cocamidopropyl Betaine - -    43 Amerchol L101 - -    44 Propylene Glycol - -    45 Triethanolamine - -    46 Sorbitane Sesquiolate - -    47 Isopropylmyristate - -    48 Polysorbate 80  - -    49 Amidoamine   (Stearamidopropyl Dimethylamine) - -    50 Oleamidopropyl Dimethylamine - -    51 Lauryl Glucoside - -    52 Coconut Diethanolamide  - -    53 2-Hydroxy-4-Methoxy Benzophenone (Oxybenzone) - -    54 Benzophenone-4 (Sulisobenzon) - -    55 Propolis - -    56 Dexpanthenol - -    57 Carboxymethyl Cellulose Sodium - -    58 Abitol - -    59 Tert-Butylhydroquinone - -    60 Benzyl Salicylate - -     Antioxidant      61 Dodecyl Gallate - -    62 Butylhydroxyanisole (BHA) - -    63 Butylhydroxytoluene (BHT) - -    64 Di-Alpha-Tocopherol (Vit E) - -    65 Propyl Gallate - -      PRESERVATIVES & ANTIMICROBIALS         No Substance 2 days 4 days remarks   66  1,2-Benzisothiazoline-3-One, Sodium Salt - -    67  1,3,5-Javier (2-Hydroxyethyl) - Hexahydrotriazine (Grotan BK) - -    68 4-Vadlrduuuevmv-9-Nitro-1, 3-Propanediol - -    69  3, 4, 4' - Triclocarban - -    70 4 - Chloro - 3 - Cresol - -    71 4 - Chloro - 4 - Xylenol (PCMX) - -    72 7-Ethylbicyclooxazolidine ([employer]ings [employer]an [id number]) - -    73 Benzalkonium Chloride - -    74 Benzyl Alcohol - -    75 Cetalkonium Chloride - -    76 Cetylpyrimidine Chloride  - -    77 Chloroacetamide - -    78 DMDM Hydantoin - -    79 Glutaraldehyde - -    80 Triclosan - -    81 Glyoxal Trimeric Dihydrate - -    82 Iodopropynyl Butylcarbamate - -    83 Octylisothiazoline - -    84 Iodoform - -    85 (Nitrobutyl) Morpholine/(Ethylnitro-Trimethylene) Dimorpholine (Bioban P 1487) - -    86 Phenoxyethanol - -    87 Phenyl Salicylate - -    88 Povidone Iodine - -    89 Sodium Benzoate - -    90 Sodium Disulfite - -    91 Sorbic Acid - -    92 Thimerosal - -     Parabens      93 Butyl-P-Hydroxybenzoate - -    94 Ethyl-P-Hydroxybenzoate - -    95  Methyl-P-Hydroxybenzoate - -    96 Propyl-P-Hydroxybenzoate - -      ANTIBIOTICS & ANTIMYCOTICS         No Substance 2 days 4 days remarks   97 Erythromycine - -    98 Framycetine Sulphate - -    99 Fusidic Acid Sodium Salt - -    100 Gentamicin Sulphate - -    101 Neomycine Sulphate - -    102 Oxytetracycline  - -    103 Polymyxin B Sulphate - -    104 Tetracycline-HCL - -    105 Sulfanilamide - -    106 Metronidazole - -    107 Oxyquinoline Mix - -    108 Nitrofurazone - -    109 Nystatin - -    110 Clotrimazole - -      Results of patch tests:                         Interpretation:    - Negative                    A    = Allergic      (+) Erythema    TI   = Toxic/irritant   + E + Infiltration    RaP = Relevance at Present     ++ E/I + Papulovesicle   Rpr  = Relevance Previously     +++ E/I/P + Blister     nR   = No Relevance    [x] No relevant allergic reaction observed: however some allergens with erythema --> see Friday (irritation?)    [] Allergic reaction diagnosed against: see later      Interpretation/ remarks:   See later    [] Patient information given   [] [facility] information   [] [employer] information    ==> final Diagnosis:  >>Subacute to chronic allergic contact dermatitis. Differential diagnosis includes atopic dermatitis.   Allergic contact dermatitis is more likely at this point given new onset, lack of atopic predisposition (no seasonal allergies or asthma), and distrubution.  Plan for patch testing to identify sensitivities or to rule out ACD if negative and treat as atopic dermatitis. Seborrheic dermatitis suggested in biopsy comments but is not consistent with location, degree of erythema, and lichenification seen on exam. The scale present is secondary to lichenification and is not greasy or yellow.      These conclusions are made at the best of ones knowledge and belief  based on the provided evidence such as patients history and allergy test results and they can change over time or can be  incomplete because of missing informations.    ==> Treatment prescribed/Plan:  Treatment  -Stop Head & Shoulders shampoo, switch to Vanicream brand Free and Clear shampoo. Continue Vanicream moisturizer.

## 2019-03-20 NOTE — LETTER
3/20/2019       RE: Eloise Johnson  [address] [facility] [id number]     Dear Colleague,    Thank you for referring your patient, Eloise Johnson, to the [facility] DERMATOLOGY at [facility]. Please see a copy of my visit note below.    [facility] Dermatology Note    Dermatology Clinic  [facility]  [address] [place] [id number]    Dermatology Problem List:  1. Hx of wart right posterior upper arm, s/p biopsy 10/11/2018  2. Hx of eyelid dermatitis  -Previously seen at [facility]: treated with Alcometazone cream, Westcort ointment, protopic ointment --- all lost efficacy  -Concerned for ACD, biopsy 1/17/19 most c/w seborrheic dermatitis   3. Vitiligo: hx of eczimer laser, topical steroids, and protopic 0.1% ointment  4. Rash/eruption, right lateral canthus, s/p biopsy 1/17/2019 with subacute spongiotic dermatitis    Encounter Date: Mar 20, 2019    CC:  Chief Complaint   Patient presents with     Allergies     Leobardo is here for allergy testing day 3       History of Present Illness:  Mr. Eloise Johnson is a 49 year old male who presents as a referral from Dr. Velázquez in regards to a patch testing consult. His previous visit with Dr. Velázquez was on 01/17/2019 where he received a punch biopsy for the facial rash/skin eruption around the right side of his eye. Biopsy showed subacute spongiotic dermatitis with no prominent Langerhans' cell collections or significant tissue eosinophilia. The patient has been unresponsive to topical steroids and protopic treatments in the past, but has not tried Elidel. He started ketoconazole shampoo and cream after receiving the biopsy results as it was read as more consistent with seborrheic dermatitis and has noticed some improvement.     Today he states the rash is much better but not gone. He still has some redness, scaling, and itching on and around his eyelids, on  his external ears, a patch on his back, and a patch on his right thigh.. He tries to remove the scale in the gym steam room but it quickly reaccumulates. The rash does not happen in the summer and is worse in the winter with lots of redness, scaling, and itchiness at night.     An allergy to gym towels or another product at his gym was considered because the rash started after he began going to the gym. He had no change in the rash after avoiding the gym for 1 week. He uses Head and Shoulders shampoo and Vanicream moisturizer on his face. Denies using hairspray or other products on his face and head. He used hydrocortisone on his right thigh without significant improvement in the past, so now only uses Vaseline in that area. Denies perfumes or essential oil diffusers in the home. 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 Hobbies include exercising at the gym, using the hot tub, and singing Karaoke.  He notices redness with a small amount of pressure when rubbing face with a towel or applying medications.   He has history of eczema a few years on his face that resolved with some cream. A few months ago (before holidays) he had several weeks of laser treatment on left and right cheek at [facility] in [place] for hypopigmentation/vitiligo.     No personal or family history of asthma or seasonal allergies.  No lip swelling.    He wonders if this is neurodermatitis. He is reluctant to pursue allergy testing because he heard that he would not be able to shower for a week.        Past Medical History:   Patient Active Problem List   Diagnosis     Mixed hyperlipidemia     Chronic fatigue     Cough     High myopia, both eyes     Family history of early CAD     HDL deficiency     Cardiomyopathy of undetermined type (H)     HUEY (obstructive sleep apnea)- moderate (AHI 21)     Acute medial meniscus tear of right knee      Nasal congestion     Past Medical History:   Diagnosis Date     Anxiety 05/01/2017    resolved 2018     Psychophysiological insomnia 05/01/2017    resolved 2018     Past Surgical History:   Procedure Laterality Date     COLONOSCOPY WITH CO2 INSUFFLATION N/A 10/24/2014    Procedure: COLONOSCOPY WITH CO2 INSUFFLATION;  Surgeon: Duane, William Charles, MD;  Location: [facility] OR     HC TOOTH EXTRACTION W/FORCEP         Social History:  Patient reports that he has quit smoking. His smoking use included cigarettes. He has a 2.00 pack-year smoking history. he has never used smokeless tobacco. He reports that he drinks about 0.5 - 1.0 oz of alcohol per week. He reports that he does not use drugs.    Family History:  Family History   Problem Relation Age of Onset     C.A.D. Father      Coronary Artery Disease Father      Eye Surgery Mother      Diabetes Maternal Grandmother      Colon Cancer No family hx of      Glaucoma No family hx of      Macular Degeneration No family hx of      Skin Cancer No family hx of        Medications:  Current Outpatient Medications   Medication Sig Dispense Refill     aspirin EC 81 MG tablet Take 1 tablet (81 mg) by mouth every other day       atorvastatin (LIPITOR) 10 MG tablet Take 1 tablet (10 mg) by mouth daily 90 tablet 3     Cholecalciferol (VITAMIN D) 1000 UNITS capsule Take 1 capsule by mouth daily       ketoconazole (NIZORAL) 2 % external cream Apply up to twice daily to the face as needed. 60 g 11     ketoconazole (NIZORAL) 2 % external shampoo Lather small amount onto wet face/scalp, leave on 3-5 min, then rinse out. Use daily on the face, 2-3 times weekly on the scalp. 120 mL 11     mometasone (NASONEX) 50 MCG/ACT nasal spray Spray 2 sprays into both nostrils daily 1 g 0     Multiple Vitamin (MULTI-VITAMIN) per tablet Take 1 tablet by mouth daily.       omega-3 fatty acids 1200 MG capsule Take 2 capsules by mouth daily.       order for DME autoCPAP 5-18 cmH20 1 Device 0      tobramycin-dexamethasone (TOBRADEX) 0.3-0.1 % ophthalmic ointment Apply to eyelids 3 x day for 1 week. 1 Tube 0       No Known Allergies    Review of Systems:  -As per HPI  -Otherwise nml state of health. No other skin concerns.    Physical exam:  Vitals: There were no vitals taken for this visit.  GEN: This is a well developed, well-nourished male in no acute distress, in a pleasant mood.    SKIN: Focused examination of the face, neck, and right thigh was performed.  -Erythematous, lichenified, white-scaled plaques on bilateral upper eyelids above eyebrows superiorly and laterally. Upper eyelid swelling noted in prior photos decreased today.    -Erythematous, lichenified, white scaled plaques on preauricular cheeks extending to external ears and into conchal bowl.  -Erythematous plaques with scattered erosions and linear red-brown crusting on bilateral nasolabial creases  -Circular, eczematous plaque on posterior neck/upper back and on right anterior thigh.  -No other lesions of concern on areas examined.        Order for PATCH TESTS    [x] Outpatient  [] Inpatient: Álvarez..../ Bed ....      Skin Atopy (atopic dermatitis) [x] Yes   [] No  Rhinitis/Sinusitis:   [] Yes   [x] No  Allergic Asthma:   [] Yes   [x] No  Food Allergy:   [] Yes   [x] No  Leg ulcers:   [] Yes   [x] No  Hand eczema:   [] Yes   [x] No   Leading hand:   [] R   [] L       [] Ambidextrous                        Reason for tests (suspected allergy): Distribution on face and neck concerning for reaction to Head & Shoulders shampoo, gym towelsor laundry detergent  Known previous allergies: None     Standardized panels  [x] Standard panel (40 tests)  [x] Preservatives & Antimicrobials (31 tests)  [x] Emulsifiers & Additives (25 tests)   [] Perfumes/Flavours & Plants (25 tests)  [] Hairdresser panel (12 tests)  [] Rubber Chemicals (22 tests)  [] Plastics (26 tests)  [] Colorants/Dyes/Food additives (20 tests)  [] Metals (implants/dental) (23 tests)  []  Local anaesthetics/NSAIDs (13 tests)  [x] Antibiotics & Antimycotics (14 tests)   [] Corticosteroids (15 tests)   [] Photopatch test (62 tests)   [] others: ...      [] Patient's own products: ...    DO NOT test if chemical or biological identity is unknown!     always ask from patient the product information and safety sheets (MSDS)     [] Patient needs consultation with Allergy team (changes of tests may apply)  [] Tests discussed with Allergy team (can have direct appointment for patch tests)    RESULTS & EVALUATION of PATCH TESTS    Patch test readings after     [x] 2 days, [] 3 days [x] 4 days, [] 5 days,    Applied patch tests with results (import here the list of patch tests):  Date/time of application:03/18/2019  Physician/Nurse:  / Rowena Serrato, [facility]               Localization of application: Back  STANDARD Series         No Substance 2 days 4 days remarks   1 Jeremías Mix [C] - -    2 Colophony - -    3  2-Mercaptobenzothiazole  - -     4 Methylisothiazolinone - -    5 Carba Mix - -    6 Thiuram Mix [A] - -    7 Bisphenol A Epoxy Resin - -    8 W-Olep-Flmdwjxrxob-Formaldehyde Resin - -    9 Mercapto Mix [A] - -    10 Black Rubber Mix- PPD [B] - -    11 Potassium Dichromate  (+)  -    12 Balsam of [place] (Myroxylon Pereirae Resin) - -    13 Nickel Sulphate Hexahydrate - -    14 Mixed Dialkyl Thiourea - -    15 Paraben Mix [B] - -    16 Methyldibromo Glutaronitrile (+) -    17 Fragrance Mix - -    18 2-Bromo-2-Nitropropane-1,3-Diol (Bronopol) - -    19 Lyral - -    20 Tixocortol-21- Pivalate - -    21 Diazolidiyl Urea (Germall II) - -    22 Methyl Methacrylate - -    23 Cobalt (II) Chloride Hexahydrate - -    24 Fragrance Mix II  - -    25 Compositae Mix - -    26 Benzoyl Peroxide - -    27 Bacitracin - -    28 Formaldehyde - -    29 Methylchloroisothiazolinone / Methylisothiazolinone - -    30 Corticosteroid Mix - -    31 Sodium Lauryl Sulfate - -    32 Lanolin Alcohol - -    33 Turpentine (+) -     34 Cetylstearylalcohol - -    35 Chlorhexidine Dicluconate - -    36 Budenoside - -    37 Imidazolidinyl Urea  - -    38 Ethyl-2 Cyanoacrylate - -    39 Quaternium 15 (Dowicil 200) - -    40 Decyl Glucoside - -      EMULSIFIERS & ADDITIVES        No Substance 2 days 4 days remarks   41 Polyethylene Glycol-400 - -    42 Cocamidopropyl Betaine - -    43 Amerchol L101 - -    44 Propylene Glycol - -    45 Triethanolamine - -    46 Sorbitane Sesquiolate - -    47 Isopropylmyristate - -    48 Polysorbate 80  - -    49 Amidoamine   (Stearamidopropyl Dimethylamine) - -    50 Oleamidopropyl Dimethylamine - -    51 Lauryl Glucoside - -    52 Coconut Diethanolamide  - -    53 2-Hydroxy-4-Methoxy Benzophenone (Oxybenzone) - -    54 Benzophenone-4 (Sulisobenzon) - -    55 Propolis - -    56 Dexpanthenol - -    57 Carboxymethyl Cellulose Sodium - -    58 Abitol - -    59 Tert-Butylhydroquinone - -    60 Benzyl Salicylate - -     Antioxidant      61 Dodecyl Gallate - -    62 Butylhydroxyanisole (BHA) - -    63 Butylhydroxytoluene (BHT) - -    64 Di-Alpha-Tocopherol (Vit E) - -    65 Propyl Gallate - -      PRESERVATIVES & ANTIMICROBIALS         No Substance 2 days 4 days remarks   66  1,2-Benzisothiazoline-3-One, Sodium Salt - -    67  1,3,5-Javier (2-Hydroxyethyl) - Hexahydrotriazine (Grotan BK) - -    68 8-Jvatzgmacfmvx-3-Nitro-1, 3-Propanediol - -    69  3, 4, 4' - Triclocarban - -    70 4 - Chloro - 3 - Cresol - -    71 4 - Chloro - 4 - Xylenol (PCMX) - -    72 7-Ethylbicyclooxazolidine (Bioban HY1956) - -    73 Benzalkonium Chloride - -    74 Benzyl Alcohol - -    75 Cetalkonium Chloride - -    76 Cetylpyrimidine Chloride  - -    77 Chloroacetamide - -    78 DMDM Hydantoin - -    79 Glutaraldehyde - -    80 Triclosan - -    81 Glyoxal Trimeric Dihydrate - -    82 Iodopropynyl Butylcarbamate - -    83 Octylisothiazoline - -    84 Iodoform - -    85 (Nitrobutyl) Morpholine/(Ethylnitro-Trimethylene) Dimorpholine ([facility]87)  - -    86 Phenoxyethanol - -    87 Phenyl Salicylate - -    88 Povidone Iodine - -    89 Sodium Benzoate - -    90 Sodium Disulfite - -    91 Sorbic Acid - -    92 Thimerosal - -     Parabens      93 Butyl-P-Hydroxybenzoate - -    94 Ethyl-P-Hydroxybenzoate - -    95 Methyl-P-Hydroxybenzoate - -    96 Propyl-P-Hydroxybenzoate - -      ANTIBIOTICS & ANTIMYCOTICS         No Substance 2 days 4 days remarks   97 Erythromycine - -    98 Framycetine Sulphate - -    99 Fusidic Acid Sodium Salt - -    100 Gentamicin Sulphate - -    101 Neomycine Sulphate - -    102 Oxytetracycline  - -    103 Polymyxin B Sulphate - -    104 Tetracycline-HCL - -    105 Sulfanilamide - -    106 Metronidazole - -    107 Oxyquinoline Mix - -    108 Nitrofurazone - -    109 Nystatin - -    110 Clotrimazole - -      Results of patch tests:                         Interpretation:    - Negative                    A    = Allergic      (+) Erythema    TI   = Toxic/irritant   + E + Infiltration    RaP = Relevance at Present     ++ E/I + Papulovesicle   Rpr  = Relevance Previously     +++ E/I/P + Blister     nR   = No Relevance    [x] No relevant allergic reaction observed: however some allergens with erythema --> see Friday (irritation?)    [] Allergic reaction diagnosed against: see later      Interpretation/ remarks:   See later    [] Patient information given   [] [facility] information   [] [employer] information    ==> final Diagnosis:  >>Subacute to chronic allergic contact dermatitis. Differential diagnosis includes atopic dermatitis.   Allergic contact dermatitis is more likely at this point given new onset, lack of atopic predisposition (no seasonal allergies or asthma), and distrubution.  Plan for patch testing to identify sensitivities or to rule out ACD if negative and treat as atopic dermatitis. Seborrheic dermatitis suggested in biopsy comments but is not consistent with location, degree of erythema, and lichenification seen on exam. The  scale present is secondary to lichenification and is not greasy or yellow.      These conclusions are made at the best of ones knowledge and belief  based on the provided evidence such as patients history and allergy test results and they can change over time or can be incomplete because of missing informations.    ==> Treatment prescribed/Plan:  Treatment  -Stop Head & Shoulders shampoo, switch to Vanicream brand Free and Clear shampoo. Continue Vanicream moisturizer.      Again, thank you for allowing me to participate in the care of your patient.      Sincerely,    Garrick Loyd MD

## 2019-03-22 ENCOUNTER — OFFICE VISIT (OUTPATIENT)
Dept: DERMATOLOGY | Facility: CLINIC | Age: 49
End: 2019-03-22
Payer: COMMERCIAL

## 2019-03-22 DIAGNOSIS — L20.89 OTHER ATOPIC DERMATITIS: Primary | ICD-10-CM

## 2019-03-22 DIAGNOSIS — L21.9 DERMATITIS, SEBORRHEIC: ICD-10-CM

## 2019-03-22 RX ORDER — KETOCONAZOLE 20 MG/ML
SHAMPOO TOPICAL
Qty: 120 ML | Refills: 3 | Status: SHIPPED | OUTPATIENT
Start: 2019-03-22 | End: 2019-11-20

## 2019-03-22 RX ORDER — PIMECROLIMUS 10 MG/G
CREAM TOPICAL
Qty: 100 G | Refills: 3 | Status: SHIPPED | OUTPATIENT
Start: 2019-03-22 | End: 2022-10-21

## 2019-03-22 ASSESSMENT — PAIN SCALES - GENERAL: PAINLEVEL: MODERATE PAIN (5)

## 2019-03-22 NOTE — LETTER
3/22/2019       RE: Eloise Johnson  [address] [facility] [address]     Dear Colleague,    Thank you for referring your patient, Eloise Johnson, to the [facility] DERMATOLOGY at [facility]. Please see a copy of my visit note below.    [facility] Dermatology Note    Dermatology Clinic  [facility]  [address] [place] [id number]    Dermatology Problem List:  1. Hx of wart right posterior upper arm, s/p biopsy 10/11/2018  2. Hx of eyelid dermatitis  -Previously seen at [facility]: treated with Alcometazone cream, Westcort ointment, protopic ointment --- all lost efficacy  -Concerned for ACD, biopsy 1/17/19 most c/w seborrheic dermatitis   3. Vitiligo: hx of eczimer laser, topical steroids, and protopic 0.1% ointment  4. Rash/eruption, right lateral canthus, s/p biopsy 1/17/2019 with subacute spongiotic dermatitis    Encounter Date: Mar 22, 2019    CC:  Chief Complaint   Patient presents with     Derm Problem     Leobardo is here today for patch testing day 5.       History of Present Illness:  Mr. Eloise Johnson is a 49 year old male who presents as a referral from Dr. Velázquez in regards to a patch testing consult. His previous visit with Dr. Velázquez was on 01/17/2019 where he received a punch biopsy for the facial rash/skin eruption around the right side of his eye. Biopsy showed subacute spongiotic dermatitis with no prominent Langerhans' cell collections or significant tissue eosinophilia. The patient has been unresponsive to topical steroids and protopic treatments in the past, but has not tried Elidel. He started ketoconazole shampoo and cream after receiving the biopsy results as it was read as more consistent with seborrheic dermatitis and has noticed some improvement.     Today he states the rash is much better but not gone. He still has some redness, scaling, and itching on and around his  eyelids, on his external ears, a patch on his back, and a patch on his right thigh.. He tries to remove the scale in the gym steam room but it quickly reaccumulates. The rash does not happen in the summer and is worse in the winter with lots of redness, scaling, and itchiness at night.     An allergy to gym towels or another product at his gym was considered because the rash started after he began going to the gym. He had no change in the rash after avoiding the gym for 1 week. He uses Head and Shoulders shampoo and Vanicream moisturizer on his face. Denies using hairspray or other products on his face and head. He used hydrocortisone on his right thigh without significant improvement in the past, so now only uses Vaseline in that area. Denies perfumes or essential oil diffusers in the home. 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 Hobbies include exercising at the gym, using the hot tub, and singing Karaoke.  He notices redness with a small amount of pressure when rubbing face with a towel or applying medications.   He has history of eczema a few years on his face that resolved with some cream. A few months ago (before holidays) he had several weeks of laser treatment on left and right cheek at [facility] in [place] for hypopigmentation/vitiligo.     No personal or family history of asthma or seasonal allergies.  No lip swelling.    He wonders if this is neurodermatitis. He is reluctant to pursue allergy testing because he heard that he would not be able to shower for a week.        Past Medical History:   Patient Active Problem List   Diagnosis     Mixed hyperlipidemia     Chronic fatigue     Cough     High myopia, both eyes     Family history of early CAD     HDL deficiency     Cardiomyopathy of undetermined type (H)     HUEY (obstructive sleep apnea)- moderate (AHI 21)     Acute medial meniscus tear  of right knee     Nasal congestion     Past Medical History:   Diagnosis Date     Anxiety 05/01/2017    resolved 2018     Psychophysiological insomnia 05/01/2017    resolved 2018     Past Surgical History:   Procedure Laterality Date     COLONOSCOPY WITH CO2 INSUFFLATION N/A 10/24/2014    Procedure: COLONOSCOPY WITH CO2 INSUFFLATION;  Surgeon: Duane, William Charles, MD;  Location: [facility] OR     HC TOOTH EXTRACTION W/FORCEP         Social History:  Patient reports that he has quit smoking. His smoking use included cigarettes. He has a 2.00 pack-year smoking history. he has never used smokeless tobacco. He reports that he drinks about 0.5 - 1.0 oz of alcohol per week. He reports that he does not use drugs.    Family History:  Family History   Problem Relation Age of Onset     C.A.D. Father      Coronary Artery Disease Father      Eye Surgery Mother      Diabetes Maternal Grandmother      Colon Cancer No family hx of      Glaucoma No family hx of      Macular Degeneration No family hx of      Skin Cancer No family hx of        Medications:  Current Outpatient Medications   Medication Sig Dispense Refill     aspirin EC 81 MG tablet Take 1 tablet (81 mg) by mouth every other day       atorvastatin (LIPITOR) 10 MG tablet Take 1 tablet (10 mg) by mouth daily 90 tablet 3     Cholecalciferol (VITAMIN D) 1000 UNITS capsule Take 1 capsule by mouth daily       ketoconazole (NIZORAL) 2 % external cream Apply up to twice daily to the face as needed. 60 g 11     ketoconazole (NIZORAL) 2 % external shampoo Lather small amount onto wet face/scalp, leave on 3-5 min, then rinse out. Use daily on the face, 2-3 times weekly on the scalp. 120 mL 11     mometasone (NASONEX) 50 MCG/ACT nasal spray Spray 2 sprays into both nostrils daily 1 g 0     Multiple Vitamin (MULTI-VITAMIN) per tablet Take 1 tablet by mouth daily.       omega-3 fatty acids 1200 MG capsule Take 2 capsules by mouth daily.       order for DME autoCPAP 5-18 cmH20 1 Device 0      tobramycin-dexamethasone (TOBRADEX) 0.3-0.1 % ophthalmic ointment Apply to eyelids 3 x day for 1 week. 1 Tube 0       No Known Allergies    Review of Systems:  -As per HPI  -Otherwise nml state of health. No other skin concerns.    Physical exam:  Vitals: There were no vitals taken for this visit.  GEN: This is a well developed, well-nourished male in no acute distress, in a pleasant mood.    SKIN: Focused examination of the face, neck, and right thigh was performed.  -Erythematous, lichenified, white-scaled plaques on bilateral upper eyelids above eyebrows superiorly and laterally. Upper eyelid swelling noted in prior photos decreased today.    -Erythematous, lichenified, white scaled plaques on preauricular cheeks extending to external ears and into conchal bowl.  -Erythematous plaques with scattered erosions and linear red-brown crusting on bilateral nasolabial creases  -Circular, eczematous plaque on posterior neck/upper back and on right anterior thigh.  -No other lesions of concern on areas examined.        Order for PATCH TESTS    [x] Outpatient  [] Inpatient: Álvarez..../ Bed ....      Skin Atopy (atopic dermatitis) [x] Yes   [] No  Rhinitis/Sinusitis:   [] Yes   [x] No  Allergic Asthma:   [] Yes   [x] No  Food Allergy:   [] Yes   [x] No  Leg ulcers:   [] Yes   [x] No  Hand eczema:   [] Yes   [x] No   Leading hand:   [] R   [] L       [] Ambidextrous                        Reason for tests (suspected allergy): Distribution on face and neck concerning for reaction to Head & Shoulders shampoo, gym towelsor laundry detergent  Known previous allergies: None     Standardized panels  [x] Standard panel (40 tests)  [x] Preservatives & Antimicrobials (31 tests)  [x] Emulsifiers & Additives (25 tests)   [] Perfumes/Flavours & Plants (25 tests)  [] Hairdresser panel (12 tests)  [] Rubber Chemicals (22 tests)  [] Plastics (26 tests)  [] Colorants/Dyes/Food additives (20 tests)  [] Metals (implants/dental) (23  tests)  [] Local anaesthetics/NSAIDs (13 tests)  [x] Antibiotics & Antimycotics (14 tests)   [] Corticosteroids (15 tests)   [] Photopatch test (62 tests)   [] others: ...      [] Patient's own products: ...    DO NOT test if chemical or biological identity is unknown!     always ask from patient the product information and safety sheets (MSDS)     [] Patient needs consultation with Allergy team (changes of tests may apply)  [] Tests discussed with Allergy team (can have direct appointment for patch tests)    RESULTS & EVALUATION of PATCH TESTS    Patch test readings after     [x] 2 days, [] 3 days [x] 4 days, [] 5 days,    Applied patch tests with results (import here the list of patch tests):  Date/time of application:03/18/2019  Physician/Nurse:  / Rowena Serrato, [facility]               Localization of application: Back  STANDARD Series         No Substance 2 days 4 days remarks   1 Jeremías Mix [C] - -    2 Colophony - -    3  2-Mercaptobenzothiazole  - -     4 Methylisothiazolinone - -    5 Carba Mix - -    6 Thiuram Mix [A] - -    7 Bisphenol A Epoxy Resin - -    8 G-Uvcx-Tfersbjbiam-Formaldehyde Resin - -    9 Mercapto Mix [A] - -    10 Black Rubber Mix- PPD [B] - -    11 Potassium Dichromate  (+)  -    12 Balsam of [place] (Myroxylon Pereirae Resin) - -    13 Nickel Sulphate Hexahydrate - -    14 Mixed Dialkyl Thiourea - -    15 Paraben Mix [B] - -    16 Methyldibromo Glutaronitrile (+) (+) Very localized, only very slightly infiltrated and irritated   17 Fragrance Mix - -    18 2-Bromo-2-Nitropropane-1,3-Diol (Bronopol) - -    19 Lyral - -    20 Tixocortol-21- Pivalate - -    21 Diazolidiyl Urea (Germall II) - -    22 Methyl Methacrylate - -    23 Cobalt (II) Chloride Hexahydrate - -    24 Fragrance Mix II  - -    25 Compositae Mix - -    26 Benzoyl Peroxide - -    27 Bacitracin - -    28 Formaldehyde - -    29 Methylchloroisothiazolinone / Methylisothiazolinone - -    30 Corticosteroid Mix - -    31  Sodium Lauryl Sulfate - -    32 Lanolin Alcohol - -    33 Turpentine (+) -    34 Cetylstearylalcohol - -    35 Chlorhexidine Dicluconate - -    36 Budenoside - -    37 Imidazolidinyl Urea  - -    38 Ethyl-2 Cyanoacrylate - -    39 Quaternium 15 (Dowicil 200) - -    40 Decyl Glucoside - -      EMULSIFIERS & ADDITIVES        No Substance 2 days 4 days remarks   41 Polyethylene Glycol-400 - -    42 Cocamidopropyl Betaine - -    43 Amerchol L101 - -    44 Propylene Glycol - -    45 Triethanolamine - -    46 Sorbitane Sesquiolate - -    47 Isopropylmyristate - -    48 Polysorbate 80  - -    49 Amidoamine   (Stearamidopropyl Dimethylamine) - -    50 Oleamidopropyl Dimethylamine - -    51 Lauryl Glucoside - -    52 Coconut Diethanolamide  - -    53 2-Hydroxy-4-Methoxy Benzophenone (Oxybenzone) - -    54 Benzophenone-4 (Sulisobenzon) - -    55 Propolis - -    56 Dexpanthenol - -    57 Carboxymethyl Cellulose Sodium - -    58 Abitol - -    59 Tert-Butylhydroquinone - -    60 Benzyl Salicylate - -     Antioxidant      61 Dodecyl Gallate - -    62 Butylhydroxyanisole (BHA) - -    63 Butylhydroxytoluene (BHT) - -    64 Di-Alpha-Tocopherol (Vit E) - -    65 Propyl Gallate - -      PRESERVATIVES & ANTIMICROBIALS         No Substance 2 days 4 days remarks   66  1,2-Benzisothiazoline-3-One, Sodium Salt - -    67  1,3,5-Javier (2-Hydroxyethyl) - Hexahydrotriazine (Grotan BK) - -    68 4-Aelzwjdjkramv-4-Nitro-1, 3-Propanediol - -    69  3, 4, 4' - Triclocarban - -    70 4 - Chloro - 3 - Cresol - -    71 4 - Chloro - 4 - Xylenol (PCMX) - -    72 7-Ethylbicyclooxazolidine (Bioban GD0865) - -    73 Benzalkonium Chloride - -    74 Benzyl Alcohol - -    75 Cetalkonium Chloride - -    76 Cetylpyrimidine Chloride  - -    77 Chloroacetamide - -    78 DMDM Hydantoin - -    79 Glutaraldehyde - -    80 Triclosan - -    81 Glyoxal Trimeric Dihydrate - -    82 Iodopropynyl Butylcarbamate - -    83 Octylisothiazoline - -    84 Iodoform - -    85  (Nitrobutyl) Morpholine/(Ethylnitro-Trimethylene) Dimorpholine (Bioban P 1487) - -    86 Phenoxyethanol - -    87 Phenyl Salicylate - -    88 Povidone Iodine - -    89 Sodium Benzoate - -    90 Sodium Disulfite - -    91 Sorbic Acid - -    92 Thimerosal - -     Parabens      93 Butyl-P-Hydroxybenzoate - -    94 Ethyl-P-Hydroxybenzoate - -    95 Methyl-P-Hydroxybenzoate - -    96 Propyl-P-Hydroxybenzoate - -      ANTIBIOTICS & ANTIMYCOTICS         No Substance 2 days 4 days remarks   97 Erythromycine - -    98 Framycetine Sulphate - -    99 Fusidic Acid Sodium Salt - -    100 Gentamicin Sulphate - -    101 Neomycine Sulphate - -    102 Oxytetracycline  - -    103 Polymyxin B Sulphate - -    104 Tetracycline-HCL - -    105 Sulfanilamide - -    106 Metronidazole - -    107 Oxyquinoline Mix - -    108 Nitrofurazone - -    109 Nystatin - -    110 Clotrimazole - -      Results of patch tests:                         Interpretation:    - Negative                    A    = Allergic      (+) Erythema    TI   = Toxic/irritant   + E + Infiltration    RaP = Relevance at Present     ++ E/I + Papulovesicle   Rpr  = Relevance Previously     +++ E/I/P + Blister     nR   = No Relevance    [x] No relevant allergic reaction observed: however some allergens with erythema --> see Friday (irritation?)      Control Tests     No Substance Readings (15min) Evaluation   POS Histamine 1mg/ml ++     NEG NaCl 0.9% -          Atopy Screen     No Substance Readings (15min) Evaluation   1 Alternaria alternata (tenuis)  -     2 Cladosporium herbarum -     3 Aspergillus fumigatus -     4 Penicillium notatum -     5 Dermatophagoides pteronyssinus -     6 Dermatophagoides farinae -     7 Dog epithelium (canis spp) -     8 Cat hair (thiago catus) -     9 Cockroach   (Blatella americana & germanica) -     10 Grass mix midwest   (Ashly, Orchard, Redtop, Vivek) -     11 Jose Juan grass (sorghum halepense) -     12 Weed mix   (common Cocklebur, Quiñones s  quarters, rough redroot Pigweed, Dock/Sorrel) -     13 Mugwort (artemisia vulgare) -     14 Ragweed giant/short (ambrosia spp) -     15 English Plantain (plantago lanceolata) -     16 Tree mix 1 (Pecan, Maple BHR, Oak RVW, american [place], black [place]) -     17 Red cedar (juniperus virginia) -     18 Tree mix 2   (white [place], river/red Birch, black [place], common [place], american Elm) -     19 Box elder/Maple mix (acer spp) -     20 [place] shagbark (carya ovata) -         -         Interpretation/ remarks:   >> Has several slightly irritant reactions, but none strong enough to evaluate as allergic reactions. Which supports the diagnosis of more atopic dermatitis in the face.  >> Atopic skin prick test negative, does not conclude atopic dermatitis. Prick test to atopic common inhalant negative, which makes an atopic predisposition unlikely, however it is not fully excluded an intrinsic atopic dermatitis.      ==> final Diagnosis:  >> Atopic Dermatitis. DDx strong seborrheic dermatitis  --> no relevant contact sensibilisation    ==> Treatment prescribed/Plan:  >> Continue Vanicream brand Free and Clear shampoo and face wash. Continue Vanicream moisturizer 2-3 times daily.    >> START ketoconazole (NIZORAL) 2 % external shampoo. Use every other day in shower. Leave in 5 minutes prior to rinsing out.    >> START Elidel 1% cream twice a day. Apply in between Vanicream products    >> STOP protopic application (Elides is a better formulation around the eyes).      These conclusions are made at the best of ones knowledge and belief  based on the provided evidence such as patients history and allergy test results and they can change over time or can be incomplete because of missing informations.      Follow-up in 4-5 weeks.      Staff Involved:    Scribe Disclosure  I, Ramsey Baker, am serving as a scribe to document services personally performed by Dr. Garrick Loyd, based on data collection and the  provider's statements to me.     I spent a total of 30 min face to face with Eloise Johnson during today's office visit. About 50% of the time was spent counseling the patient and/or coordinating care regarding their allergy.        Control Tests    No Substance Readings (15min) Evaluation   POS Histamine 1mg/ml -    NEG NaCl 0.9% -      Atopy Screen    No Substance Readings (15min) Evaluation   1 Alternaria alternata (tenuis)  -    2 Cladosporium herbarum -    3 Aspergillus fumigatus -    4 Penicillium notatum -    5 Dermatophagoides pteronyssinus -    6 Dermatophagoides farinae -    7 Dog epithelium (canis spp) -    8 Cat hair (thiago catus) -    9 Cockroach   (Blatella americana & germanica) -    10 Grass mix midwest   (Ashly, Orchard, Redtop, Vivek) -    11 Jose Juan grass (sorghum halepense) -    12 Weed mix   (common Cocklebur, Lamb s quarters, rough redroot Pigweed, Dock/Sorrel) -    13 Mugwort (artemisia vulgare) -    14 Ragweed giant/short (ambrosia spp) -    15 English Plantain (plantago lanceolata) -    16 Tree mix 1 (Pecan, Maple BHR, Oak RVW, american [place], black [place]) -    17 Red cedar (juniperus virginia) -    18 Tree mix 2   (white [place], river/red Birch, black [place], common [place], american Elm) -    19 Box elder/Maple mix (acer spp) -    20 Gregory shagbark (carya ovata) -       -        Again, thank you for allowing me to participate in the care of your patient.      Sincerely,    Garrick Loyd MD

## 2019-03-22 NOTE — LETTER
March 22, 2019      Eloise Johnson  [address] [facility] [address]            Dear ,      Thank you for referring your patient, Eloise Johnson, for allergy testing. This patient was seen on 03/22/19  for prick testing. The below compounds were tested. Please see below for my interpretation and a list of tests preformed.       Positive Reactions:  All negative       Interpretation/Remarks  Atopic skin prick test negative, does not conclude atopic dermatitis. Prick test to atopic common inhalant negative, which makes an atopic predisposition unlikely, however it is not fully excluded an intrinsic atopic dermatitis.      Final Diagnosis  Atopic Dermatitis. DDx strong seborrheic dermatitis      Treatment/Plan    Continue Vanicream brand Free and Clear shampoo and face wash. Continue Vanicream moisturizer 2-3 times daily.    START ketoconazole (NIZORAL) 2 % external shampoo. Use every other day in shower. Leave in 5 minutes prior to rinsing out.    START Elidel 1% cream twice a day. Apply in between Vanicream products    STOP protopic application.      Tested Products  (Atopy) Alternaria alternata (tenuis) ,Cladosporium herbarum, Aspergillus fumigatus, Penicillium notatum, Dermatophagoides pteronyssinus, Dermatophagoides farinae, Dog epithelium (canis spp), Cat hair (thiago catus), Cockroach (Blatella americana & germanica), Grass mix midwest (Ashly, Orchard, Redtop, Vivek), Jose Juan grass (sorghum halepense), Dock/Sorrel mix (rumex spp), Mugwort (artemisia vulgare), Ragweed giant/short (ambrosia spp), Lambs Quarter (chenopodium album), English Plantain (plantago lanceolata), Eastern Dows (populus deltoides), Eastern [place] mix (querqus spp), Birch mix (betula spp), [place] red/green/white (fraxinus spp), Box elder/Maple mix (acer spp), [place] shagbark (carya ovata), Red cedar (juniperus virginia), American Elm (ulmus Americana)      These conclusions are made at the best of ones knowledge and belief  based  on the provided evidence such as patients history and allergy test results and they can change over time or can be incomplete because of missing informations.           If you have any questions please call [phone]      Sincerely,    Garrick Loyd MD

## 2019-03-22 NOTE — PROGRESS NOTES
[facility] Dermatology Note    Dermatology Clinic  [facility]  [address] [place] [id number]    Dermatology Problem List:  1. Hx of wart right posterior upper arm, s/p biopsy 10/11/2018  2. Hx of eyelid dermatitis  -Previously seen at [facility]: treated with Alcometazone cream, Westcort ointment, protopic ointment --- all lost efficacy  -Concerned for ACD, biopsy 1/17/19 most c/w seborrheic dermatitis   3. Vitiligo: hx of eczimer laser, topical steroids, and protopic 0.1% ointment  4. Rash/eruption, right lateral canthus, s/p biopsy 1/17/2019 with subacute spongiotic dermatitis    Encounter Date: Mar 22, 2019    CC:  Chief Complaint   Patient presents with     Derm Problem     Leobardo is here today for patch testing day 5.       History of Present Illness:  Mr. Navas He is a 49 year old male who presents as a referral from Dr. Velázquez in regards to a patch testing consult. His previous visit with Dr. Velázquez was on 01/17/2019 where he received a punch biopsy for the facial rash/skin eruption around the right side of his eye. Biopsy showed subacute spongiotic dermatitis with no prominent Langerhans' cell collections or significant tissue eosinophilia. The patient has been unresponsive to topical steroids and protopic treatments in the past, but has not tried Elidel. He started ketoconazole shampoo and cream after receiving the biopsy results as it was read as more consistent with seborrheic dermatitis and has noticed some improvement.     Today he states the rash is much better but not gone. He still has some redness, scaling, and itching on and around his eyelids, on his external ears, a patch on his back, and a patch on his right thigh.. He tries to remove the scale in the gym steam room but it quickly reaccumulates. The rash does not happen in the summer and is worse in the winter with lots of redness, scaling, and  itchiness at night.     An allergy to gym towels or another product at his gym was considered because the rash started after he began going to the gym. He had no change in the rash after avoiding the gym for 1 week. He uses Head and Shoulders shampoo and Vanicream moisturizer on his face. Denies using hairspray or other products on his face and head. He used hydrocortisone on his right thigh without significant improvement in the past, so now only uses Vaseline in that area. Denies perfumes or essential oil diffusers in the home. 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 Hobbies include exercising at the gym, using the hot tub, and singing Karaoke.  He notices redness with a small amount of pressure when rubbing face with a towel or applying medications.   He has history of eczema a few years on his face that resolved with some cream. A few months ago (before holidays) he had several weeks of laser treatment on left and right cheek at [facility] in [place] for hypopigmentation/vitiligo.     No personal or family history of asthma or seasonal allergies.  No lip swelling.    He wonders if this is neurodermatitis. He is reluctant to pursue allergy testing because he heard that he would not be able to shower for a week.        Past Medical History:   Patient Active Problem List   Diagnosis     Mixed hyperlipidemia     Chronic fatigue     Cough     High myopia, both eyes     Family history of early CAD     HDL deficiency     Cardiomyopathy of undetermined type (H)     HUEY (obstructive sleep apnea)- moderate (AHI 21)     Acute medial meniscus tear of right knee     Nasal congestion     Past Medical History:   Diagnosis Date     Anxiety 05/01/2017    resolved 2018     Psychophysiological insomnia 05/01/2017    resolved 2018     Past Surgical History:   Procedure Laterality Date     COLONOSCOPY WITH CO2  INSUFFLATION N/A 10/24/2014    Procedure: COLONOSCOPY WITH CO2 INSUFFLATION;  Surgeon: Duane, William Charles, MD;  Location: [facility] OR     HC TOOTH EXTRACTION W/FORCEP         Social History:  Patient reports that he has quit smoking. His smoking use included cigarettes. He has a 2.00 pack-year smoking history. he has never used smokeless tobacco. He reports that he drinks about 0.5 - 1.0 oz of alcohol per week. He reports that he does not use drugs.    Family History:  Family History   Problem Relation Age of Onset     C.A.D. Father      Coronary Artery Disease Father      Eye Surgery Mother      Diabetes Maternal Grandmother      Colon Cancer No family hx of      Glaucoma No family hx of      Macular Degeneration No family hx of      Skin Cancer No family hx of        Medications:  Current Outpatient Medications   Medication Sig Dispense Refill     aspirin EC 81 MG tablet Take 1 tablet (81 mg) by mouth every other day       atorvastatin (LIPITOR) 10 MG tablet Take 1 tablet (10 mg) by mouth daily 90 tablet 3     Cholecalciferol (VITAMIN D) 1000 UNITS capsule Take 1 capsule by mouth daily       ketoconazole (NIZORAL) 2 % external cream Apply up to twice daily to the face as needed. 60 g 11     ketoconazole (NIZORAL) 2 % external shampoo Lather small amount onto wet face/scalp, leave on 3-5 min, then rinse out. Use daily on the face, 2-3 times weekly on the scalp. 120 mL 11     mometasone (NASONEX) 50 MCG/ACT nasal spray Spray 2 sprays into both nostrils daily 1 g 0     Multiple Vitamin (MULTI-VITAMIN) per tablet Take 1 tablet by mouth daily.       omega-3 fatty acids 1200 MG capsule Take 2 capsules by mouth daily.       order for DME autoCPAP 5-18 cmH20 1 Device 0     tobramycin-dexamethasone (TOBRADEX) 0.3-0.1 % ophthalmic ointment Apply to eyelids 3 x day for 1 week. 1 Tube 0       No Known Allergies    Review of Systems:  -As per HPI  -Otherwise nml state of health. No other skin concerns.    Physical exam:  Vitals:  There were no vitals taken for this visit.  GEN: This is a well developed, well-nourished male in no acute distress, in a pleasant mood.    SKIN: Focused examination of the face, neck, and right thigh was performed.  -Erythematous, lichenified, white-scaled plaques on bilateral upper eyelids above eyebrows superiorly and laterally. Upper eyelid swelling noted in prior photos decreased today.    -Erythematous, lichenified, white scaled plaques on preauricular cheeks extending to external ears and into conchal bowl.  -Erythematous plaques with scattered erosions and linear red-brown crusting on bilateral nasolabial creases  -Circular, eczematous plaque on posterior neck/upper back and on right anterior thigh.  -No other lesions of concern on areas examined.        Order for PATCH TESTS    [x] Outpatient  [] Inpatient: Álvarez..../ Bed ....      Skin Atopy (atopic dermatitis) [x] Yes   [] No  Rhinitis/Sinusitis:   [] Yes   [x] No  Allergic Asthma:   [] Yes   [x] No  Food Allergy:   [] Yes   [x] No  Leg ulcers:   [] Yes   [x] No  Hand eczema:   [] Yes   [x] No   Leading hand:   [] R   [] L       [] Ambidextrous                        Reason for tests (suspected allergy): Distribution on face and neck concerning for reaction to Head & Shoulders shampoo, gym towelsor laundry detergent  Known previous allergies: None     Standardized panels  [x] Standard panel (40 tests)  [x] Preservatives & Antimicrobials (31 tests)  [x] Emulsifiers & Additives (25 tests)   [] Perfumes/Flavours & Plants (25 tests)  [] Hairdresser panel (12 tests)  [] Rubber Chemicals (22 tests)  [] Plastics (26 tests)  [] Colorants/Dyes/Food additives (20 tests)  [] Metals (implants/dental) (23 tests)  [] Local anaesthetics/NSAIDs (13 tests)  [x] Antibiotics & Antimycotics (14 tests)   [] Corticosteroids (15 tests)   [] Photopatch test (62 tests)   [] others: ...      [] Patient's own products: ...    DO NOT test if chemical or biological identity is  unknown!     always ask from patient the product information and safety sheets (MSDS)     [] Patient needs consultation with Allergy team (changes of tests may apply)  [] Tests discussed with Allergy team (can have direct appointment for patch tests)    RESULTS & EVALUATION of PATCH TESTS    Patch test readings after     [x] 2 days, [] 3 days [x] 4 days, [] 5 days,    Applied patch tests with results (import here the list of patch tests):  Date/time of application:03/18/2019  Physician/Nurse:  / Rowena Serrato, [facility]               Localization of application: Back  STANDARD Series         No Substance 2 days 4 days remarks   1 Jeremías Mix [C] - -    2 Colophony - -    3  2-Mercaptobenzothiazole  - -     4 Methylisothiazolinone - -    5 Carba Mix - -    6 Thiuram Mix [A] - -    7 Bisphenol A Epoxy Resin - -    8 S-Yemg-Vobdakjywph-Formaldehyde Resin - -    9 Mercapto Mix [A] - -    10 Black Rubber Mix- PPD [B] - -    11 Potassium Dichromate  (+)  -    12 Balsam of [place] (Myroxylon Pereirae Resin) - -    13 Nickel Sulphate Hexahydrate - -    14 Mixed Dialkyl Thiourea - -    15 Paraben Mix [B] - -    16 Methyldibromo Glutaronitrile (+) (+) Very localized, only very slightly infiltrated and irritated   17 Fragrance Mix - -    18 2-Bromo-2-Nitropropane-1,3-Diol (Bronopol) - -    19 Lyral - -    20 Tixocortol-21- Pivalate - -    21 Diazolidiyl Urea (Germall II) - -    22 Methyl Methacrylate - -    23 Cobalt (II) Chloride Hexahydrate - -    24 Fragrance Mix II  - -    25 Compositae Mix - -    26 Benzoyl Peroxide - -    27 Bacitracin - -    28 Formaldehyde - -    29 Methylchloroisothiazolinone / Methylisothiazolinone - -    30 Corticosteroid Mix - -    31 Sodium Lauryl Sulfate - -    32 Lanolin Alcohol - -    33 Turpentine (+) -    34 Cetylstearylalcohol - -    35 Chlorhexidine Dicluconate - -    36 Budenoside - -    37 Imidazolidinyl Urea  - -    38 Ethyl-2 Cyanoacrylate - -    39 Quaternium 15 (Dowicil 200) - -     40 Decyl Glucoside - -      EMULSIFIERS & ADDITIVES        No Substance 2 days 4 days remarks   41 Polyethylene Glycol-400 - -    42 Cocamidopropyl Betaine - -    43 Amerchol L101 - -    44 Propylene Glycol - -    45 Triethanolamine - -    46 Sorbitane Sesquiolate - -    47 Isopropylmyristate - -    48 Polysorbate 80  - -    49 Amidoamine   (Stearamidopropyl Dimethylamine) - -    50 Oleamidopropyl Dimethylamine - -    51 Lauryl Glucoside - -    52 Coconut Diethanolamide  - -    53 2-Hydroxy-4-Methoxy Benzophenone (Oxybenzone) - -    54 Benzophenone-4 (Sulisobenzon) - -    55 Propolis - -    56 Dexpanthenol - -    57 Carboxymethyl Cellulose Sodium - -    58 Abitol - -    59 Tert-Butylhydroquinone - -    60 Benzyl Salicylate - -     Antioxidant      61 Dodecyl Gallate - -    62 Butylhydroxyanisole (BHA) - -    63 Butylhydroxytoluene (BHT) - -    64 Di-Alpha-Tocopherol (Vit E) - -    65 Propyl Gallate - -      PRESERVATIVES & ANTIMICROBIALS         No Substance 2 days 4 days remarks   66  1,2-Benzisothiazoline-3-One, Sodium Salt - -    67  1,3,5-Javier (2-Hydroxyethyl) - Hexahydrotriazine (Grotan BK) - -    68 5-Vmxccyaevwpxj-8-Nitro-1, 3-Propanediol - -    69  3, 4, 4' - Triclocarban - -    70 4 - Chloro - 3 - Cresol - -    71 4 - Chloro - 4 - Xylenol (PCMX) - -    72 7-Ethylbicyclooxazolidine (Symwavean FX6484) - -    73 Benzalkonium Chloride - -    74 Benzyl Alcohol - -    75 Cetalkonium Chloride - -    76 Cetylpyrimidine Chloride  - -    77 Chloroacetamide - -    78 DMDM Hydantoin - -    79 Glutaraldehyde - -    80 Triclosan - -    81 Glyoxal Trimeric Dihydrate - -    82 Iodopropynyl Butylcarbamate - -    83 Octylisothiazoline - -    84 Iodoform - -    85 (Nitrobutyl) Morpholine/(Ethylnitro-Trimethylene) Dimorpholine (Symwavean P 5387) - -    86 Phenoxyethanol - -    87 Phenyl Salicylate - -    88 Povidone Iodine - -    89 Sodium Benzoate - -    90 Sodium Disulfite - -    91 Sorbic Acid - -    92 Thimerosal - -      Parabens      93 Butyl-P-Hydroxybenzoate - -    94 Ethyl-P-Hydroxybenzoate - -    95 Methyl-P-Hydroxybenzoate - -    96 Propyl-P-Hydroxybenzoate - -      ANTIBIOTICS & ANTIMYCOTICS         No Substance 2 days 4 days remarks   97 Erythromycine - -    98 Framycetine Sulphate - -    99 Fusidic Acid Sodium Salt - -    100 Gentamicin Sulphate - -    101 Neomycine Sulphate - -    102 Oxytetracycline  - -    103 Polymyxin B Sulphate - -    104 Tetracycline-HCL - -    105 Sulfanilamide - -    106 Metronidazole - -    107 Oxyquinoline Mix - -    108 Nitrofurazone - -    109 Nystatin - -    110 Clotrimazole - -      Results of patch tests:                         Interpretation:    - Negative                    A    = Allergic      (+) Erythema    TI   = Toxic/irritant   + E + Infiltration    RaP = Relevance at Present     ++ E/I + Papulovesicle   Rpr  = Relevance Previously     +++ E/I/P + Blister     nR   = No Relevance    [x] No relevant allergic reaction observed: however some allergens with erythema --> see Friday (irritation?)      Control Tests     No Substance Readings (15min) Evaluation   POS Histamine 1mg/ml ++     NEG NaCl 0.9% -          Atopy Screen     No Substance Readings (15min) Evaluation   1 Alternaria alternata (tenuis)  -     2 Cladosporium herbarum -     3 Aspergillus fumigatus -     4 Penicillium notatum -     5 Dermatophagoides pteronyssinus -     6 Dermatophagoides farinae -     7 Dog epithelium (canis spp) -     8 Cat hair (thiago catus) -     9 Cockroach   (Blatella americana & germanica) -     10 Grass mix midwest   (Ashly, Orchard, Redtop, Vivek) -     11 Jose Juan grass (sorghum halepense) -     12 Weed mix   (common Cocklebur, Lamb s quarters, rough redroot Pigweed, Dock/Sorrel) -     13 Mugwort (artemisia vulgare) -     14 Ragweed giant/short (ambrosia spp) -     15 English Plantain (plantago lanceolata) -     16 Tree mix 1 (Pecan, Maple BHR, Oak RVW, american [place], black [place]) -     17 Red  cedar (juniperus virginia) -     18 Tree mix 2   (white [place], river/red Birch, black [place], common [place], american Elm) -     19 Box elder/Maple mix (acer spp) -     20 [place] shagbark (carya ovata) -         -         Interpretation/ remarks:   >> Has several slightly irritant reactions, but none strong enough to evaluate as allergic reactions. Which supports the diagnosis of more atopic dermatitis in the face.  >> Atopic skin prick test negative, does not conclude atopic dermatitis. Prick test to atopic common inhalant negative, which makes an atopic predisposition unlikely, however it is not fully excluded an intrinsic atopic dermatitis.      ==> final Diagnosis:  >> Atopic Dermatitis. DDx strong seborrheic dermatitis  --> no relevant contact sensibilisation    ==> Treatment prescribed/Plan:  >> Continue Vanicream brand Free and Clear shampoo and face wash. Continue Vanicream moisturizer 2-3 times daily.    >> START ketoconazole (NIZORAL) 2 % external shampoo. Use every other day in shower. Leave in 5 minutes prior to rinsing out.    >> START Elidel 1% cream twice a day. Apply in between Vanicream products    >> STOP protopic application (Elides is a better formulation around the eyes).      These conclusions are made at the best of ones knowledge and belief  based on the provided evidence such as patients history and allergy test results and they can change over time or can be incomplete because of missing informations.      Follow-up in 4-5 weeks.      Staff Involved:    Scribe Disclosure  I, Ramsey Baker, am serving as a scribe to document services personally performed by Dr. Garrick Loyd, based on data collection and the provider's statements to me.     I spent a total of 30 min face to face with Eloise Johnson during today's office visit. About 50% of the time was spent counseling the patient and/or coordinating care regarding their allergy.

## 2019-03-22 NOTE — NURSING NOTE
Chief Complaint   Patient presents with     Derm Problem     Leobardo is here today for patch testing day 5.     Omayra Hernandes LPN

## 2019-03-22 NOTE — PROGRESS NOTES
Control Tests    No Substance Readings (15min) Evaluation   POS Histamine 1mg/ml -    NEG NaCl 0.9% -      Atopy Screen    No Substance Readings (15min) Evaluation   1 Alternaria alternata (tenuis)  -    2 Cladosporium herbarum -    3 Aspergillus fumigatus -    4 Penicillium notatum -    5 Dermatophagoides pteronyssinus -    6 Dermatophagoides farinae -    7 Dog epithelium (canis spp) -    8 Cat hair (thiago catus) -    9 Cockroach   (Blatella americana & germanica) -    10 Grass mix [place]   (Ashly, Orchard, Redtop, Vivek) -    11 Jose Juan grass (sorghum halepense) -    12 Weed mix   (common Cocklebur, Lamb s quarters, rough redroot Pigweed, Dock/Sorrel) -    13 Mugwort (artemisia vulgare) -    14 Ragweed giant/short (ambrosia spp) -    15 English Plantain (plantago lanceolata) -    16 Tree mix 1 (Pecan, Maple BHR, Oak RVW, american [place], black [place]) -    17 Red cedar (juniperus virginia) -    18 Tree mix 2   (white [place], river/red Birch, black [place], common [place], american Elm) -    19 Box elder/Maple mix (acer spp) -    20 Pierce shagbark (carya ovata) -       -

## 2019-03-22 NOTE — LETTER
March 22, 2019      Eloise Johnson  [address] [address]              Dear ,      Thank you for referring your patient, Eloise Johnson, for allergy testing. This patient was seen on 3/18/19, 3/20/19, and 3/22/19 for patch testing. The below compounds were tested. Please see below for my interpretation and a list of tests preformed.       Interpretation/Remarks  Has several slightly irritant reactions, but none strong enough to evaluate as allergic reactions. Which supports the diagnosis of more atopic dermatitis in the face. Atopic skin prick test negative, does not conclude atopic dermatitis. Prick test to atopic common inhalant negative, which makes an atopic predisposition unlikely, however it is not fully excluded an intrinsic atopic dermatitis.      Final Diagnosis  Atopic Dermatitis. DDx strong seborrheic dermatitis      Treatment/Plan    Continue Vanicream brand Free and Clear shampoo and face wash. Continue Vanicream moisturizer 2-3 times daily.    START ketoconazole (NIZORAL) 2 % external shampoo. Use every other day in shower. Leave in 5 minutes prior to rinsing out.     START Elidel 1% cream twice a day. Apply in between Vanicream products    STOP protopic application.        Tested Products  (Standard) Jeremías Mix [C], Colophony, 2-Mercaptobenzothiazole, Methylisothiazolinone, Carba Mix, Thiuram Mix [A], Bisphenol A Epoxy Resin, H-Mjsj-Cgekrkluwry Formaldehyde Resin, Mercapto Mix [A], Black Rubber Mix- PPD [B], Potassium Dichromate, Balsam of [place] (Myroxylon Pereirae Resin), Nickel Sulphate Hexahydrate, Mixed Dialkyl Thiourea, Paraben Mix [B], Methyldibromo Glutaro-Nitrile, Fragrance mix,  2-Bromo-2-nitropropane-1,3-diol (Bronopol), Lyral, Tixocortol-21-Pivalate, Diazolidiyl Urea (Germall II), Methyl Methacrylate, Cobalt (II) Chloride Hexahydrate, Fragrance Mix II, Compositae Mix, Benzoyl Peroxide, Bacitracin, Formaldehyde, Methylchloroisothiazolinone / Methylisothiazolinone,  Corticoseteroid Mix, Sodium Lauryl Sulfate, Lanolin Alcohol, Oil of Turpentine, Cetylstearylalcohol, Chlorhexidine Dicluconate, Budenoside, Imidazolidinyl Urea, Ethyl Cyanoacrylate, Quaternium 15, Decyl Glucoside (Preservatives and Antimicrobial) 1,2-benzisothiazoline-3-one, Sodium Salt, 1,3,5-Javier(2-Hydroxyethyl) -Hexahydrotriazine(Grotan BK), 2-Hydroxymethyl- 2-Nitro-1,3-Propanediol, 3,4,4'-Triclocarban, 4-Chloro-3- Cresol, 4-Chloro-3-5-Xylénol (PCMX), 7-Ethylbicyclooxazolidine (BioBan CS 1246), Benzalkonium Chloride, Benzyl Alcohol, Cetalkonium Chloride, Cetylpyrimidine Chloride, Chloroacetamide, DMDM Hydantoin, Glutaraldehyde, Triclosan, Glyoxal Trimeric Dihydrate, Iodopropynyl Butylcarbamate, Octylisothiazolinone, Iodoform, (Nitrobutyl) Morpholine/ (Ethylnitrotrimethylene) dimorpholine(Biopan ), Phenoxyethanol, Phenyl Salicylate, Povidone Iodine, Sodium Benzoate, Sodium Disulfite, Sorbic Acid, Thimerosal, Butyl-P-Hydroxybenzoate, Ethyl-P-Hydroxybenzoate, Methyl-P-Hydroxybenzoate, Propyl-P-Hydroxybenzoate (Emulsifiers) Polyethylene Glycol-400, Cocamidopropyl Betaine, Amerchol L101, Propylene Glycol, Triethanolamine, Sorbitane Sesquiolate, Isopropylmyristate, Polysorbate 80, Amidoamine (Stearamidopropyl Dimethylamine), Oleamidopropyl Dimethylamine, Lauryl Glucoside, Coconut Diethanolamide, 2-Hydroxy-4-Methoxybenzo-Phenone (Oxybenzone), Benzophenone-4 (Sulisobenzon), Propolis, Dexpanthenol, Carboxymethyl Cellulose Sodium, Abitol, Tert- Butylhydroquinone, Benzyl Salicylate, Dodecyl Gallate, Butylhydroxyanisole (BHA), Butylhydroxytoluene (BHT), Di-Alpha-Tocopherol, Propyl Gallate (Antibiotics and Antimycotics) Erythromycine, Framycetine Sulphate, Fusidic Acid Sodium Salt, Gentamicin Sulphate, Neomycine Sulphate, Oxytetracycline, Polymyxin B Sulphate, Tetracycline-HCL, Sulfanilamide, Metronidazole, Oxyquinoline Mix, Nitrofurazone, Nystatin, Clotrimazole       These conclusions are made at the best of ones  knowledge and belief  based on the provided evidence such as patients history and allergy test results and they can change over time or can be incomplete because of missing informations.       If you have any questions please call [phone]      Sincerely,    Garrick Loyd MD

## 2019-03-22 NOTE — PATIENT INSTRUCTIONS
Order for PATCH TESTS    [x] Outpatient  [] Inpatient: Álvarez..../ Bed ....      Skin Atopy (atopic dermatitis) [x] Yes   [] No  Rhinitis/Sinusitis:   [] Yes   [x] No  Allergic Asthma:   [] Yes   [x] No  Food Allergy:   [] Yes   [x] No  Leg ulcers:   [] Yes   [x] No  Hand eczema:   [] Yes   [x] No   Leading hand:   [] R   [] L       [] Ambidextrous                        Reason for tests (suspected allergy): Distribution on face and neck concerning for reaction to Head & Shoulders shampoo, gym towelsor laundry detergent  Known previous allergies: None     Standardized panels  [x] Standard panel (40 tests)  [x] Preservatives & Antimicrobials (31 tests)  [x] Emulsifiers & Additives (25 tests)   [] Perfumes/Flavours & Plants (25 tests)  [] Hairdresser panel (12 tests)  [] Rubber Chemicals (22 tests)  [] Plastics (26 tests)  [] Colorants/Dyes/Food additives (20 tests)  [] Metals (implants/dental) (23 tests)  [] Local anaesthetics/NSAIDs (13 tests)  [x] Antibiotics & Antimycotics (14 tests)   [] Corticosteroids (15 tests)   [] Photopatch test (62 tests)   [] others: ...      [] Patient's own products: ...    DO NOT test if chemical or biological identity is unknown!     always ask from patient the product information and safety sheets (MSDS)     [] Patient needs consultation with Allergy team (changes of tests may apply)  [] Tests discussed with Allergy team (can have direct appointment for patch tests)    RESULTS & EVALUATION of PATCH TESTS    Patch test readings after     [x] 2 days, [] 3 days [x] 4 days, [] 5 days,    Applied patch tests with results (import here the list of patch tests):  Date/time of application:03/18/2019  Physician/Nurse:  / Rowena Serrato, [facility]               Localization of application: Back  STANDARD Series         No Substance 2 days 4 days remarks   1 Jeremías Mix [C] - -    2 Colophony - -    3  2-Mercaptobenzothiazole  - -     4 Methylisothiazolinone - -    5 Carba Mix - -    6 Thiuram  Mix [A] - -    7 Bisphenol A Epoxy Resin - -    8 E-Csfv-Etgcuvoygdl-Formaldehyde Resin - -    9 Mercapto Mix [A] - -    10 Black Rubber Mix- PPD [B] - -    11 Potassium Dichromate  (+)  -    12 Balsam of [place] (Myroxylon Pereirae Resin) - -    13 Nickel Sulphate Hexahydrate - -    14 Mixed Dialkyl Thiourea - -    15 Paraben Mix [B] - -    16 Methyldibromo Glutaronitrile (+) (+) Very localized, only very slightly infiltrated and irritated   17 Fragrance Mix - -    18 2-Bromo-2-Nitropropane-1,3-Diol (Bronopol) - -    19 Lyral - -    20 Tixocortol-21- Pivalate - -    21 Diazolidiyl Urea (Germall II) - -    22 Methyl Methacrylate - -    23 Cobalt (II) Chloride Hexahydrate - -    24 Fragrance Mix II  - -    25 Compositae Mix - -    26 Benzoyl Peroxide - -    27 Bacitracin - -    28 Formaldehyde - -    29 Methylchloroisothiazolinone / Methylisothiazolinone - -    30 Corticosteroid Mix - -    31 Sodium Lauryl Sulfate - -    32 Lanolin Alcohol - -    33 Turpentine (+) -    34 Cetylstearylalcohol - -    35 Chlorhexidine Dicluconate - -    36 Budenoside - -    37 Imidazolidinyl Urea  - -    38 Ethyl-2 Cyanoacrylate - -    39 Quaternium 15 (Dowicil 200) - -    40 Decyl Glucoside - -      EMULSIFIERS & ADDITIVES        No Substance 2 days 4 days remarks   41 Polyethylene Glycol-400 - -    42 Cocamidopropyl Betaine - -    43 Amerchol L101 - -    44 Propylene Glycol - -    45 Triethanolamine - -    46 Sorbitane Sesquiolate - -    47 Isopropylmyristate - -    48 Polysorbate 80  - -    49 Amidoamine   (Stearamidopropyl Dimethylamine) - -    50 Oleamidopropyl Dimethylamine - -    51 Lauryl Glucoside - -    52 Coconut Diethanolamide  - -    53 2-Hydroxy-4-Methoxy Benzophenone (Oxybenzone) - -    54 Benzophenone-4 (Sulisobenzon) - -    55 Propolis - -    56 Dexpanthenol - -    57 Carboxymethyl Cellulose Sodium - -    58 Abitol - -    59 Tert-Butylhydroquinone - -    60 Benzyl Salicylate - -     Antioxidant      61 Dodecyl Gallate - -     62 Butylhydroxyanisole (BHA) - -    63 Butylhydroxytoluene (BHT) - -    64 Di-Alpha-Tocopherol (Vit E) - -    65 Propyl Gallate - -      PRESERVATIVES & ANTIMICROBIALS         No Substance 2 days 4 days remarks   66  1,2-Benzisothiazoline-3-One, Sodium Salt - -    67  1,3,5-Javier (2-Hydroxyethyl) - Hexahydrotriazine (Grotan BK) - -    68 5-Iponnzwqgudvz-8-Nitro-1, 3-Propanediol - -    69  3, 4, 4' - Triclocarban - -    70 4 - Chloro - 3 - Cresol - -    71 4 - Chloro - 4 - Xylenol (PCMX) - -    72 7-Ethylbicyclooxazolidine (iPinYouan BS0864) - -    73 Benzalkonium Chloride - -    74 Benzyl Alcohol - -    75 Cetalkonium Chloride - -    76 Cetylpyrimidine Chloride  - -    77 Chloroacetamide - -    78 DMDM Hydantoin - -    79 Glutaraldehyde - -    80 Triclosan - -    81 Glyoxal Trimeric Dihydrate - -    82 Iodopropynyl Butylcarbamate - -    83 Octylisothiazoline - -    84 Iodoform - -    85 (Nitrobutyl) Morpholine/(Ethylnitro-Trimethylene) Dimorpholine (iPinYouan P 1487) - -    86 Phenoxyethanol - -    87 Phenyl Salicylate - -    88 Povidone Iodine - -    89 Sodium Benzoate - -    90 Sodium Disulfite - -    91 Sorbic Acid - -    92 Thimerosal - -     Parabens      93 Butyl-P-Hydroxybenzoate - -    94 Ethyl-P-Hydroxybenzoate - -    95 Methyl-P-Hydroxybenzoate - -    96 Propyl-P-Hydroxybenzoate - -      ANTIBIOTICS & ANTIMYCOTICS         No Substance 2 days 4 days remarks   97 Erythromycine - -    98 Framycetine Sulphate - -    99 Fusidic Acid Sodium Salt - -    100 Gentamicin Sulphate - -    101 Neomycine Sulphate - -    102 Oxytetracycline  - -    103 Polymyxin B Sulphate - -    104 Tetracycline-HCL - -    105 Sulfanilamide - -    106 Metronidazole - -    107 Oxyquinoline Mix - -    108 Nitrofurazone - -    109 Nystatin - -    110 Clotrimazole - -      Results of patch tests:                         Interpretation:    - Negative                    A    = Allergic      (+) Erythema    TI   = Toxic/irritant   + E +  Infiltration    RaP = Relevance at Present     ++ E/I + Papulovesicle   Rpr  = Relevance Previously     +++ E/I/P + Blister     nR   = No Relevance    [x] No relevant allergic reaction observed: however some allergens with erythema --> see Friday (irritation?)    [] Allergic reaction diagnosed against: see later    Control Tests     No Substance Readings (15min) Evaluation   POS Histamine 1mg/ml ++     NEG NaCl 0.9% -          Atopy Screen     No Substance Readings (15min) Evaluation   1 Alternaria alternata (tenuis)  -     2 Cladosporium herbarum -     3 Aspergillus fumigatus -     4 Penicillium notatum -     5 Dermatophagoides pteronyssinus -     6 Dermatophagoides farinae -     7 Dog epithelium (canis spp) -     8 Cat hair (thiago catus) -     9 Cockroach   (Blatella americana & germanica) -     10 Grass mix midwest   (Ashly, Orchard, Redtop, Vivek) -     11 Jose Juan grass (sorghum halepense) -     12 Weed mix   (common Cocklebur, Lamb s quarters, rough redroot Pigweed, Dock/Sorrel) -     13 Mugwort (artemisia vulgare) -     14 Ragweed giant/short (ambrosia spp) -     15 English Plantain (plantago lanceolata) -     16 Tree mix 1 (Pecan, Maple BHR, Oak RVW, american [place], black [place]) -     17 Red cedar (juniperus virginia) -     18 Tree mix 2   (white [place], river/red Birch, black [place], common [place], american Elm) -     19 Box elder/Maple mix (acer spp) -     20 Citrus shagbark (carya ovata) -         -         Interpretation/ remarks:   >> Has several slightly irritant reactions, but none strong enough to evaluate as allergic reactions. Which supports the diagnosis of more atopic dermatitis in the face.  >> Atopic skin prick test negative, does not conclude atopic dermatitis. Prick test to atopic common inhalant negative, which makes an atopic predisposition unlikely, however it is not fully excluded an intrinsic atopic dermatitis.    [] Patient information given   [] [facility] information   []  [employer] information    ==> final Diagnosis:  >> Atopic Dermatitis. DDx strong seborrheic dermatitis    ==> Treatment prescribed/Plan:  >> Continue Vanicream brand Free and Clear shampoo and face wash. Continue Vanicream moisturizer 2-3 times daily.    >> START ketoconazole (NIZORAL) 2 % external shampoo. Use every other day in shower. Leave in 5 minutes prior to rinsing out.    >> START Elidel 1% cream twice a day. Apply in between Vanicream products    >> STOP protopic application.

## 2019-04-22 ENCOUNTER — OFFICE VISIT (OUTPATIENT)
Dept: DERMATOLOGY | Facility: CLINIC | Age: 49
End: 2019-04-22
Payer: COMMERCIAL

## 2019-04-22 DIAGNOSIS — L20.89 OTHER ATOPIC DERMATITIS: Primary | ICD-10-CM

## 2019-04-22 DIAGNOSIS — L21.9 DERMATITIS, SEBORRHEIC: ICD-10-CM

## 2019-04-22 ASSESSMENT — PAIN SCALES - GENERAL: PAINLEVEL: NO PAIN (0)

## 2019-04-22 NOTE — PROGRESS NOTES
[facility] Dermatology Note    Dermatology Clinic  [facility]  [address] [place] [id number]    Dermatology Problem List:  1. Hx of wart right posterior upper arm, s/p biopsy 10/11/2018  2. Hx of eyelid dermatitis  -Previously seen at [facility]: treated with Alcometazone cream, Westcort ointment, protopic ointment --- all lost efficacy  -Concerned for ACD, biopsy 1/17/19 most c/w seborrheic dermatitis   3. Vitiligo: hx of eczimer laser, topical steroids, and protopic 0.1% ointment  4. Rash/eruption, right lateral canthus, s/p biopsy 1/17/2019 with subacute spongiotic dermatitis    Encounter Date: Apr 22, 2019    CC:  Chief Complaint   Patient presents with     Allergies     Leobardo is here for follow up        History of Present Illness:  Mr. Navas He is a 49 year old male who presents as a referral from Dr. Velázquez in regards to a patch testing consult. His previous visit with Dr. Velázquez was on 01/17/2019 where he received a punch biopsy for the facial rash/skin eruption around the right side of his eye. Biopsy showed subacute spongiotic dermatitis with no prominent Langerhans' cell collections or significant tissue eosinophilia. The patient has been unresponsive to topical steroids and protopic treatments in the past, but has not tried Elidel. He started ketoconazole shampoo and cream after receiving the biopsy results as it was read as more consistent with seborrheic dermatitis and has noticed some improvement.     Today he states the rash is much better but not gone. He still has some redness, scaling, and itching on and around his eyelids, on his external ears, a patch on his back, and a patch on his right thigh.. He tries to remove the scale in the gym steam room but it quickly reaccumulates. The rash does not happen in the summer and is worse in the winter with lots of redness, scaling, and itchiness at night.     An  allergy to gym towels or another product at his gym was considered because the rash started after he began going to the gym. He had no change in the rash after avoiding the gym for 1 week. He uses Head and Shoulders shampoo and Vanicream moisturizer on his face. Denies using hairspray or other products on his face and head. He used hydrocortisone on his right thigh without significant improvement in the past, so now only uses Vaseline in that area. Denies perfumes or essential oil diffusers in the home. 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 Hobbies include exercising at the gym, using the hot tub, and singing Karaoke.  He notices redness with a small amount of pressure when rubbing face with a towel or applying medications.   He has history of eczema a few years on his face that resolved with some cream. A few months ago (before holidays) he had several weeks of laser treatment on left and right cheek at [facility] in [place] for hypopigmentation/vitiligo.     No personal or family history of asthma or seasonal allergies.  No lip swelling.    He wonders if this is neurodermatitis. He is reluctant to pursue allergy testing because he heard that he would not be able to shower for a week.    April 22, 2019>> Today he states he has not started Elidel due to the significant cost ($500). His symptoms have improved as long as he continues his current regimen (Tarolimus in AM and Vanicream in the PM), and is happy with the results.    Past Medical History:   Patient Active Problem List   Diagnosis     Mixed hyperlipidemia     Chronic fatigue     Cough     High myopia, both eyes     Family history of early CAD     HDL deficiency     Cardiomyopathy of undetermined type (H)     HUEY (obstructive sleep apnea)- moderate (AHI 21)     Acute medial meniscus tear of right knee     Nasal congestion     Past Medical  History:   Diagnosis Date     Anxiety 05/01/2017    resolved 2018     Psychophysiological insomnia 05/01/2017    resolved 2018     Past Surgical History:   Procedure Laterality Date     COLONOSCOPY WITH CO2 INSUFFLATION N/A 10/24/2014    Procedure: COLONOSCOPY WITH CO2 INSUFFLATION;  Surgeon: Duane, William Charles, MD;  Location: [facility] OR     HC TOOTH EXTRACTION W/FORCEP         Social History:  Patient reports that he has quit smoking. His smoking use included cigarettes. He has a 2.00 pack-year smoking history. He has never used smokeless tobacco. He reports that he drinks about 0.5 - 1.0 oz of alcohol per week. He reports that he does not use drugs.    Family History:  Family History   Problem Relation Age of Onset     C.A.D. Father      Coronary Artery Disease Father      Eye Surgery Mother      Diabetes Maternal Grandmother      Colon Cancer No family hx of      Glaucoma No family hx of      Macular Degeneration No family hx of      Skin Cancer No family hx of        Medications:  Current Outpatient Medications   Medication Sig Dispense Refill     aspirin EC 81 MG tablet Take 1 tablet (81 mg) by mouth every other day       atorvastatin (LIPITOR) 10 MG tablet Take 1 tablet (10 mg) by mouth daily 90 tablet 3     Cholecalciferol (VITAMIN D) 1000 UNITS capsule Take 1 capsule by mouth daily       ketoconazole (NIZORAL) 2 % external cream Apply up to twice daily to the face as needed. 60 g 11     ketoconazole (NIZORAL) 2 % external shampoo Use every other day in shower. Leave in 5 minutes prior to rinsing out. 120 mL 3     ketoconazole (NIZORAL) 2 % external shampoo Lather small amount onto wet face/scalp, leave on 3-5 min, then rinse out. Use daily on the face, 2-3 times weekly on the scalp. 120 mL 11     mometasone (NASONEX) 50 MCG/ACT nasal spray Spray 2 sprays into both nostrils daily 1 g 0     Multiple Vitamin (MULTI-VITAMIN) per tablet Take 1 tablet by mouth daily.       omega-3 fatty acids 1200 MG capsule Take 2  capsules by mouth daily.       order for DME autoCPAP 5-18 cmH20 1 Device 0     pimecrolimus (ELIDEL) 1 % external cream Apply twice daily to affected areas until clear 100 g 3     tobramycin-dexamethasone (TOBRADEX) 0.3-0.1 % ophthalmic ointment Apply to eyelids 3 x day for 1 week. 1 Tube 0       No Known Allergies    Review of Systems:  -As per HPI  -Otherwise nml state of health. No other skin concerns.    Physical exam:  Vitals: There were no vitals taken for this visit.  GEN: This is a well developed, well-nourished male in no acute distress, in a pleasant mood.    SKIN: Focused examination of the face, neck, and right thigh was performed.  - slight thickening and erythematous of the periorbital region  -No other lesions of concern on areas examined.        Order for PATCH TESTS    [x] Outpatient  [] Inpatient: Álvarez..../ Bed ....      Skin Atopy (atopic dermatitis) [x] Yes   [] No  Rhinitis/Sinusitis:   [] Yes   [x] No  Allergic Asthma:   [] Yes   [x] No  Food Allergy:   [] Yes   [x] No  Leg ulcers:   [] Yes   [x] No  Hand eczema:   [] Yes   [x] No   Leading hand:   [] R   [] L       [] Ambidextrous                        Reason for tests (suspected allergy): Distribution on face and neck concerning for reaction to Head & Shoulders shampoo, gym towelsor laundry detergent  Known previous allergies: None     Standardized panels  [x] Standard panel (40 tests)  [x] Preservatives & Antimicrobials (31 tests)  [x] Emulsifiers & Additives (25 tests)   [] Perfumes/Flavours & Plants (25 tests)  [] Hairdresser panel (12 tests)  [] Rubber Chemicals (22 tests)  [] Plastics (26 tests)  [] Colorants/Dyes/Food additives (20 tests)  [] Metals (implants/dental) (23 tests)  [] Local anaesthetics/NSAIDs (13 tests)  [x] Antibiotics & Antimycotics (14 tests)   [] Corticosteroids (15 tests)   [] Photopatch test (62 tests)   [] others: ...      [] Patient's own products: ...    DO NOT test if chemical or biological identity is  unknown!     always ask from patient the product information and safety sheets (MSDS)     [] Patient needs consultation with Allergy team (changes of tests may apply)  [] Tests discussed with Allergy team (can have direct appointment for patch tests)    RESULTS & EVALUATION of PATCH TESTS    Patch test readings after     [x] 2 days, [] 3 days [x] 4 days, [] 5 days,    Applied patch tests with results (import here the list of patch tests):  Date/time of application:03/18/2019  Physician/Nurse:  / Rowena Serrato, [facility]               Localization of application: Back  STANDARD Series         No Substance 2 days 4 days remarks   1 Jeremías Mix [C] - -    2 Colophony - -    3  2-Mercaptobenzothiazole  - -     4 Methylisothiazolinone - -    5 Carba Mix - -    6 Thiuram Mix [A] - -    7 Bisphenol A Epoxy Resin - -    8 W-Incb-Aljizzcqeme-Formaldehyde Resin - -    9 Mercapto Mix [A] - -    10 Black Rubber Mix- PPD [B] - -    11 Potassium Dichromate  (+)  -    12 Balsam of [place] (Myroxylon Pereirae Resin) - -    13 Nickel Sulphate Hexahydrate - -    14 Mixed Dialkyl Thiourea - -    15 Paraben Mix [B] - -    16 Methyldibromo Glutaronitrile (+) (+) Very localized, only very slightly infiltrated and irritated   17 Fragrance Mix - -    18 2-Bromo-2-Nitropropane-1,3-Diol (Bronopol) - -    19 Lyral - -    20 Tixocortol-21- Pivalate - -    21 Diazolidiyl Urea (Germall II) - -    22 Methyl Methacrylate - -    23 Cobalt (II) Chloride Hexahydrate - -    24 Fragrance Mix II  - -    25 Compositae Mix - -    26 Benzoyl Peroxide - -    27 Bacitracin - -    28 Formaldehyde - -    29 Methylchloroisothiazolinone / Methylisothiazolinone - -    30 Corticosteroid Mix - -    31 Sodium Lauryl Sulfate - -    32 Lanolin Alcohol - -    33 Turpentine (+) -    34 Cetylstearylalcohol - -    35 Chlorhexidine Dicluconate - -    36 Budenoside - -    37 Imidazolidinyl Urea  - -    38 Ethyl-2 Cyanoacrylate - -    39 Quaternium 15 (Dowicil 200) - -     40 Decyl Glucoside - -      EMULSIFIERS & ADDITIVES        No Substance 2 days 4 days remarks   41 Polyethylene Glycol-400 - -    42 Cocamidopropyl Betaine - -    43 Amerchol L101 - -    44 Propylene Glycol - -    45 Triethanolamine - -    46 Sorbitane Sesquiolate - -    47 Isopropylmyristate - -    48 Polysorbate 80  - -    49 Amidoamine   (Stearamidopropyl Dimethylamine) - -    50 Oleamidopropyl Dimethylamine - -    51 Lauryl Glucoside - -    52 Coconut Diethanolamide  - -    53 2-Hydroxy-4-Methoxy Benzophenone (Oxybenzone) - -    54 Benzophenone-4 (Sulisobenzon) - -    55 Propolis - -    56 Dexpanthenol - -    57 Carboxymethyl Cellulose Sodium - -    58 Abitol - -    59 Tert-Butylhydroquinone - -    60 Benzyl Salicylate - -     Antioxidant      61 Dodecyl Gallate - -    62 Butylhydroxyanisole (BHA) - -    63 Butylhydroxytoluene (BHT) - -    64 Di-Alpha-Tocopherol (Vit E) - -    65 Propyl Gallate - -      PRESERVATIVES & ANTIMICROBIALS         No Substance 2 days 4 days remarks   66  1,2-Benzisothiazoline-3-One, Sodium Salt - -    67  1,3,5-Javier (2-Hydroxyethyl) - Hexahydrotriazine (Grotan BK) - -    68 3-Bulsnmxfqmayz-5-Nitro-1, 3-Propanediol - -    69  3, 4, 4' - Triclocarban - -    70 4 - Chloro - 3 - Cresol - -    71 4 - Chloro - 4 - Xylenol (PCMX) - -    72 7-Ethylbicyclooxazolidine (The Film Coan [id number]) - -    73 Benzalkonium Chloride - -    74 Benzyl Alcohol - -    75 Cetalkonium Chloride - -    76 Cetylpyrimidine Chloride  - -    77 Chloroacetamide - -    78 DMDM Hydantoin - -    79 Glutaraldehyde - -    80 Triclosan - -    81 Glyoxal Trimeric Dihydrate - -    82 Iodopropynyl Butylcarbamate - -    83 Octylisothiazoline - -    84 Iodoform - -    85 (Nitrobutyl) Morpholine/(Ethylnitro-Trimethylene) Dimorpholine (The Film Coan P 6307) - -    86 Phenoxyethanol - -    87 Phenyl Salicylate - -    88 Povidone Iodine - -    89 Sodium Benzoate - -    90 Sodium Disulfite - -    91 Sorbic Acid - -    92 Thimerosal - -      Parabens      93 Butyl-P-Hydroxybenzoate - -    94 Ethyl-P-Hydroxybenzoate - -    95 Methyl-P-Hydroxybenzoate - -    96 Propyl-P-Hydroxybenzoate - -      ANTIBIOTICS & ANTIMYCOTICS         No Substance 2 days 4 days remarks   97 Erythromycine - -    98 Framycetine Sulphate - -    99 Fusidic Acid Sodium Salt - -    100 Gentamicin Sulphate - -    101 Neomycine Sulphate - -    102 Oxytetracycline  - -    103 Polymyxin B Sulphate - -    104 Tetracycline-HCL - -    105 Sulfanilamide - -    106 Metronidazole - -    107 Oxyquinoline Mix - -    108 Nitrofurazone - -    109 Nystatin - -    110 Clotrimazole - -      Results of patch tests:                         Interpretation:    - Negative                    A    = Allergic      (+) Erythema    TI   = Toxic/irritant   + E + Infiltration    RaP = Relevance at Present     ++ E/I + Papulovesicle   Rpr  = Relevance Previously     +++ E/I/P + Blister     nR   = No Relevance    [x] No relevant allergic reaction observed: however some allergens with erythema --> see Friday (irritation?)      Control Tests     No Substance Readings (15min) Evaluation   POS Histamine 1mg/ml ++     NEG NaCl 0.9% -          Atopy Screen     No Substance Readings (15min) Evaluation   1 Alternaria alternata (tenuis)  -     2 Cladosporium herbarum -     3 Aspergillus fumigatus -     4 Penicillium notatum -     5 Dermatophagoides pteronyssinus -     6 Dermatophagoides farinae -     7 Dog epithelium (canis spp) -     8 Cat hair (thiago catus) -     9 Cockroach   (Blatella americana & germanica) -     10 Grass mix midwest   (Ashly, Orchard, Redtop, Vivek) -     11 Jose Juan grass (sorghum halepense) -     12 Weed mix   (common Cocklebur, Lamb s quarters, rough redroot Pigweed, Dock/Sorrel) -     13 Mugwort (artemisia vulgare) -     14 Ragweed giant/short (ambrosia spp) -     15 English Plantain (plantago lanceolata) -     16 Tree mix 1 (Pecan, Maple BHR, Oak RVW, american [place], black [place]) -     17 Red  cedar (juniperus virginia) -     18 Tree mix 2   (white [place], river/red Birch, black [place], common [place], american Elm) -     19 Box elder/Maple mix (acer spp) -     20 Blair shagbark (carya ovata) -         -         Interpretation/ remarks:   >> Has several slightly irritant reactions, but none strong enough to evaluate as allergic reactions. Which supports the diagnosis of more atopic dermatitis in the face.  >> Atopic skin prick test negative, does not conclude atopic dermatitis. Prick test to atopic common inhalant negative, which makes an atopic predisposition unlikely, however it is not fully excluded an intrinsic atopic dermatitis.    ==> final Diagnosis:  >> Atopic Dermatitis. DDx strong seborrheic dermatitis  --> no relevant contact sensibilisation    ==> Treatment prescribed/Plan:  >> Continue Vanicream brand Free and Clear shampoo and face wash. Continue Vanicream moisturizer 2-3 times daily.    >> Continue ketoconazole (NIZORAL) 2 % external shampoo. Use every other day in shower. Leave in 5 minutes prior to rinsing out.    >> Continue Tacrolimus application in the AM until symptoms have subsided. If refill is needed, pt to call or submit email.    These conclusions are made at the best of ones knowledge and belief  based on the provided evidence such as patients history and allergy test results and they can change over time or can be incomplete because of missing informations.      Follow-up in Nov/Dec.      Staff Involved:    Scribe Disclosure  LAEXA, Ramsey Baker, am serving as a scribe to document services personally performed by Dr. Garrick Loyd, based on data collection and the provider's statements to me.     Start Time: 5:40 PM   End Time: 5:48 PM     I spent a total of 8 min face to face with Eloise Johnson during today's office visit. About 50% of the time was spent counseling the patient and/or coordinating care regarding their allergy.

## 2019-04-22 NOTE — NURSING NOTE
Dermatology Rooming Note    Eloise He's goals for this visit include:   Chief Complaint   Patient presents with     Allergies     Leobardo is here for follow up      Rowena Serrato, CMA

## 2019-04-22 NOTE — LETTER
4/22/2019       RE: Eloise Johnson  [address] [facility] [id number]     Dear Colleague,    Thank you for referring your patient, Eloise Johnson, to the [facility] DERMATOLOGY at [facility]. Please see a copy of my visit note below.    [facility] Dermatology Note    Dermatology Clinic  [facility]  [address] [place] [id number]    Dermatology Problem List:  1. Hx of wart right posterior upper arm, s/p biopsy 10/11/2018  2. Hx of eyelid dermatitis  -Previously seen at [facility]: treated with Alcometazone cream, Westcort ointment, protopic ointment --- all lost efficacy  -Concerned for ACD, biopsy 1/17/19 most c/w seborrheic dermatitis   3. Vitiligo: hx of eczimer laser, topical steroids, and protopic 0.1% ointment  4. Rash/eruption, right lateral canthus, s/p biopsy 1/17/2019 with subacute spongiotic dermatitis    Encounter Date: Apr 22, 2019    CC:  Chief Complaint   Patient presents with     Allergies     Leobardo is here for follow up        History of Present Illness:  Mr. Eloise Johnson is a 49 year old male who presents as a referral from Dr. Velázquez in regards to a patch testing consult. His previous visit with Dr. Velázquez was on 01/17/2019 where he received a punch biopsy for the facial rash/skin eruption around the right side of his eye. Biopsy showed subacute spongiotic dermatitis with no prominent Langerhans' cell collections or significant tissue eosinophilia. The patient has been unresponsive to topical steroids and protopic treatments in the past, but has not tried Elidel. He started ketoconazole shampoo and cream after receiving the biopsy results as it was read as more consistent with seborrheic dermatitis and has noticed some improvement.     Today he states the rash is much better but not gone. He still has some redness, scaling, and itching on and around his eyelids, on his  external ears, a patch on his back, and a patch on his right thigh.. He tries to remove the scale in the gym steam room but it quickly reaccumulates. The rash does not happen in the summer and is worse in the winter with lots of redness, scaling, and itchiness at night.     An allergy to gym towels or another product at his gym was considered because the rash started after he began going to the gym. He had no change in the rash after avoiding the gym for 1 week. He uses Head and Shoulders shampoo and Vanicream moisturizer on his face. Denies using hairspray or other products on his face and head. He used hydrocortisone on his right thigh without significant improvement in the past, so now only uses Vaseline in that area. Denies perfumes or essential oil diffusers in the home. 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 Hobbies include exercising at the gym, using the hot tub, and singing Karaoke.  He notices redness with a small amount of pressure when rubbing face with a towel or applying medications.   He has history of eczema a few years on his face that resolved with some cream. A few months ago (before holidays) he had several weeks of laser treatment on left and right cheek at [facility] in [place] for hypopigmentation/vitiligo.     No personal or family history of asthma or seasonal allergies.  No lip swelling.    He wonders if this is neurodermatitis. He is reluctant to pursue allergy testing because he heard that he would not be able to shower for a week.    April 22, 2019>> Today he states he has not started Elidel due to the significant cost ($500). His symptoms have improved as long as he continues his current regimen (Tarolimus in AM and Vanicream in the PM), and is happy with the results.    Past Medical History:   Patient Active Problem List   Diagnosis     Mixed hyperlipidemia     Chronic  fatigue     Cough     High myopia, both eyes     Family history of early CAD     HDL deficiency     Cardiomyopathy of undetermined type (H)     HUEY (obstructive sleep apnea)- moderate (AHI 21)     Acute medial meniscus tear of right knee     Nasal congestion     Past Medical History:   Diagnosis Date     Anxiety 05/01/2017    resolved 2018     Psychophysiological insomnia 05/01/2017    resolved 2018     Past Surgical History:   Procedure Laterality Date     COLONOSCOPY WITH CO2 INSUFFLATION N/A 10/24/2014    Procedure: COLONOSCOPY WITH CO2 INSUFFLATION;  Surgeon: Duane, William Charles, MD;  Location: [facility] OR     HC TOOTH EXTRACTION W/FORCEP         Social History:  Patient reports that he has quit smoking. His smoking use included cigarettes. He has a 2.00 pack-year smoking history. He has never used smokeless tobacco. He reports that he drinks about 0.5 - 1.0 oz of alcohol per week. He reports that he does not use drugs.    Family History:  Family History   Problem Relation Age of Onset     C.A.D. Father      Coronary Artery Disease Father      Eye Surgery Mother      Diabetes Maternal Grandmother      Colon Cancer No family hx of      Glaucoma No family hx of      Macular Degeneration No family hx of      Skin Cancer No family hx of        Medications:  Current Outpatient Medications   Medication Sig Dispense Refill     aspirin EC 81 MG tablet Take 1 tablet (81 mg) by mouth every other day       atorvastatin (LIPITOR) 10 MG tablet Take 1 tablet (10 mg) by mouth daily 90 tablet 3     Cholecalciferol (VITAMIN D) 1000 UNITS capsule Take 1 capsule by mouth daily       ketoconazole (NIZORAL) 2 % external cream Apply up to twice daily to the face as needed. 60 g 11     ketoconazole (NIZORAL) 2 % external shampoo Use every other day in shower. Leave in 5 minutes prior to rinsing out. 120 mL 3     ketoconazole (NIZORAL) 2 % external shampoo Lather small amount onto wet face/scalp, leave on 3-5 min, then rinse out. Use  daily on the face, 2-3 times weekly on the scalp. 120 mL 11     mometasone (NASONEX) 50 MCG/ACT nasal spray Spray 2 sprays into both nostrils daily 1 g 0     Multiple Vitamin (MULTI-VITAMIN) per tablet Take 1 tablet by mouth daily.       omega-3 fatty acids 1200 MG capsule Take 2 capsules by mouth daily.       order for DME autoCPAP 5-18 cmH20 1 Device 0     pimecrolimus (ELIDEL) 1 % external cream Apply twice daily to affected areas until clear 100 g 3     tobramycin-dexamethasone (TOBRADEX) 0.3-0.1 % ophthalmic ointment Apply to eyelids 3 x day for 1 week. 1 Tube 0       No Known Allergies    Review of Systems:  -As per HPI  -Otherwise nml state of health. No other skin concerns.    Physical exam:  Vitals: There were no vitals taken for this visit.  GEN: This is a well developed, well-nourished male in no acute distress, in a pleasant mood.    SKIN: Focused examination of the face, neck, and right thigh was performed.  - slight thickening and erythematous of the periorbital region  -No other lesions of concern on areas examined.        Order for PATCH TESTS    [x] Outpatient  [] Inpatient: Álvarez..../ Bed ....      Skin Atopy (atopic dermatitis) [x] Yes   [] No  Rhinitis/Sinusitis:   [] Yes   [x] No  Allergic Asthma:   [] Yes   [x] No  Food Allergy:   [] Yes   [x] No  Leg ulcers:   [] Yes   [x] No  Hand eczema:   [] Yes   [x] No   Leading hand:   [] R   [] L       [] Ambidextrous                        Reason for tests (suspected allergy): Distribution on face and neck concerning for reaction to Head & Shoulders shampoo, gym towelsor laundry detergent  Known previous allergies: None     Standardized panels  [x] Standard panel (40 tests)  [x] Preservatives & Antimicrobials (31 tests)  [x] Emulsifiers & Additives (25 tests)   [] Perfumes/Flavours & Plants (25 tests)  [] Hairdresser panel (12 tests)  [] Rubber Chemicals (22 tests)  [] Plastics (26 tests)  [] Colorants/Dyes/Food additives (20 tests)  [] Metals  (implants/dental) (23 tests)  [] Local anaesthetics/NSAIDs (13 tests)  [x] Antibiotics & Antimycotics (14 tests)   [] Corticosteroids (15 tests)   [] Photopatch test (62 tests)   [] others: ...      [] Patient's own products: ...    DO NOT test if chemical or biological identity is unknown!     always ask from patient the product information and safety sheets (MSDS)     [] Patient needs consultation with Allergy team (changes of tests may apply)  [] Tests discussed with Allergy team (can have direct appointment for patch tests)    RESULTS & EVALUATION of PATCH TESTS    Patch test readings after     [x] 2 days, [] 3 days [x] 4 days, [] 5 days,    Applied patch tests with results (import here the list of patch tests):  Date/time of application:03/18/2019  Physician/Nurse:  / Rowena Serrato, [facility]               Localization of application: Back  STANDARD Series         No Substance 2 days 4 days remarks   1 Jereímas Mix [C] - -    2 Colophony - -    3  2-Mercaptobenzothiazole  - -     4 Methylisothiazolinone - -    5 Carba Mix - -    6 Thiuram Mix [A] - -    7 Bisphenol A Epoxy Resin - -    8 R-Cqjk-Regbrtqlvah-Formaldehyde Resin - -    9 Mercapto Mix [A] - -    10 Black Rubber Mix- PPD [B] - -    11 Potassium Dichromate  (+)  -    12 Balsam of [place] (Myroxylon Pereirae Resin) - -    13 Nickel Sulphate Hexahydrate - -    14 Mixed Dialkyl Thiourea - -    15 Paraben Mix [B] - -    16 Methyldibromo Glutaronitrile (+) (+) Very localized, only very slightly infiltrated and irritated   17 Fragrance Mix - -    18 2-Bromo-2-Nitropropane-1,3-Diol (Bronopol) - -    19 Lyral - -    20 Tixocortol-21- Pivalate - -    21 Diazolidiyl Urea (Germall II) - -    22 Methyl Methacrylate - -    23 Cobalt (II) Chloride Hexahydrate - -    24 Fragrance Mix II  - -    25 Compositae Mix - -    26 Benzoyl Peroxide - -    27 Bacitracin - -    28 Formaldehyde - -    29 Methylchloroisothiazolinone / Methylisothiazolinone - -    30  Corticosteroid Mix - -    31 Sodium Lauryl Sulfate - -    32 Lanolin Alcohol - -    33 Turpentine (+) -    34 Cetylstearylalcohol - -    35 Chlorhexidine Dicluconate - -    36 Budenoside - -    37 Imidazolidinyl Urea  - -    38 Ethyl-2 Cyanoacrylate - -    39 Quaternium 15 (Dowicil 200) - -    40 Decyl Glucoside - -      EMULSIFIERS & ADDITIVES        No Substance 2 days 4 days remarks   41 Polyethylene Glycol-400 - -    42 Cocamidopropyl Betaine - -    43 Amerchol L101 - -    44 Propylene Glycol - -    45 Triethanolamine - -    46 Sorbitane Sesquiolate - -    47 Isopropylmyristate - -    48 Polysorbate 80  - -    49 Amidoamine   (Stearamidopropyl Dimethylamine) - -    50 Oleamidopropyl Dimethylamine - -    51 Lauryl Glucoside - -    52 Coconut Diethanolamide  - -    53 2-Hydroxy-4-Methoxy Benzophenone (Oxybenzone) - -    54 Benzophenone-4 (Sulisobenzon) - -    55 Propolis - -    56 Dexpanthenol - -    57 Carboxymethyl Cellulose Sodium - -    58 Abitol - -    59 Tert-Butylhydroquinone - -    60 Benzyl Salicylate - -     Antioxidant      61 Dodecyl Gallate - -    62 Butylhydroxyanisole (BHA) - -    63 Butylhydroxytoluene (BHT) - -    64 Di-Alpha-Tocopherol (Vit E) - -    65 Propyl Gallate - -      PRESERVATIVES & ANTIMICROBIALS         No Substance 2 days 4 days remarks   66  1,2-Benzisothiazoline-3-One, Sodium Salt - -    67  1,3,5-Javier (2-Hydroxyethyl) - Hexahydrotriazine (Grotan BK) - -    68 0-Atjtzfljhmxlh-3-Nitro-1, 3-Propanediol - -    69  3, 4, 4' - Triclocarban - -    70 4 - Chloro - 3 - Cresol - -    71 4 - Chloro - 4 - Xylenol (PCMX) - -    72 7-Ethylbicyclooxazolidine (Bioban FL6013) - -    73 Benzalkonium Chloride - -    74 Benzyl Alcohol - -    75 Cetalkonium Chloride - -    76 Cetylpyrimidine Chloride  - -    77 Chloroacetamide - -    78 DMDM Hydantoin - -    79 Glutaraldehyde - -    80 Triclosan - -    81 Glyoxal Trimeric Dihydrate - -    82 Iodopropynyl Butylcarbamate - -    83 Octylisothiazoline - -     84 Iodoform - -    85 (Nitrobutyl) Morpholine/(Ethylnitro-Trimethylene) Dimorpholine (Bioban P 1487) - -    86 Phenoxyethanol - -    87 Phenyl Salicylate - -    88 Povidone Iodine - -    89 Sodium Benzoate - -    90 Sodium Disulfite - -    91 Sorbic Acid - -    92 Thimerosal - -     Parabens      93 Butyl-P-Hydroxybenzoate - -    94 Ethyl-P-Hydroxybenzoate - -    95 Methyl-P-Hydroxybenzoate - -    96 Propyl-P-Hydroxybenzoate - -      ANTIBIOTICS & ANTIMYCOTICS         No Substance 2 days 4 days remarks   97 Erythromycine - -    98 Framycetine Sulphate - -    99 Fusidic Acid Sodium Salt - -    100 Gentamicin Sulphate - -    101 Neomycine Sulphate - -    102 Oxytetracycline  - -    103 Polymyxin B Sulphate - -    104 Tetracycline-HCL - -    105 Sulfanilamide - -    106 Metronidazole - -    107 Oxyquinoline Mix - -    108 Nitrofurazone - -    109 Nystatin - -    110 Clotrimazole - -      Results of patch tests:                         Interpretation:    - Negative                    A    = Allergic      (+) Erythema    TI   = Toxic/irritant   + E + Infiltration    RaP = Relevance at Present     ++ E/I + Papulovesicle   Rpr  = Relevance Previously     +++ E/I/P + Blister     nR   = No Relevance    [x] No relevant allergic reaction observed: however some allergens with erythema --> see Friday (irritation?)    Control Tests     No Substance Readings (15min) Evaluation   POS Histamine 1mg/ml ++     NEG NaCl 0.9% -          Atopy Screen     No Substance Readings (15min) Evaluation   1 Alternaria alternata (tenuis)  -     2 Cladosporium herbarum -     3 Aspergillus fumigatus -     4 Penicillium notatum -     5 Dermatophagoides pteronyssinus -     6 Dermatophagoides farinae -     7 Dog epithelium (canis spp) -     8 Cat hair (thiago catus) -     9 Cockroach   (Blatella americana & germanica) -     10 Grass mix midwest   (Ashly, Orchard, Redtop, Vivek) -     11 Jose Juan grass (sorghum halepense) -     12 Weed mix   (common  Cocklebur, Quiñones s quarters, rough redroot Pigweed, Dock/Sorrel) -     13 Mugwort (artemisia vulgare) -     14 Ragweed giant/short (ambrosia spp) -     15 English Plantain (plantago lanceolata) -     16 Tree mix 1 (Pecan, Maple BHR, Oak RVW, american Cable, black [place]) -     17 Red cedar (juniperus virginia) -     18 Tree mix 2   (white [place], river/red Birch, black [place], common [place], american Elm) -     19 Box elder/Maple mix (acer spp) -     20 Ponce shagbark (carya ovata) -         -         Interpretation/ remarks:   >> Has several slightly irritant reactions, but none strong enough to evaluate as allergic reactions. Which supports the diagnosis of more atopic dermatitis in the face.  >> Atopic skin prick test negative, does not conclude atopic dermatitis. Prick test to atopic common inhalant negative, which makes an atopic predisposition unlikely, however it is not fully excluded an intrinsic atopic dermatitis.    ==> final Diagnosis:  >> Atopic Dermatitis. DDx strong seborrheic dermatitis  --> no relevant contact sensibilisation    ==> Treatment prescribed/Plan:  >> Continue Vanicream brand Free and Clear shampoo and face wash. Continue Vanicream moisturizer 2-3 times daily.    >> Continue ketoconazole (NIZORAL) 2 % external shampoo. Use every other day in shower. Leave in 5 minutes prior to rinsing out.    >> Continue Tacrolimus application in the AM until symptoms have subsided. If refill is needed, pt to call or submit email.    These conclusions are made at the best of ones knowledge and belief  based on the provided evidence such as patients history and allergy test results and they can change over time or can be incomplete because of missing informations.      Follow-up in Nov/Dec.      Staff Involved:    Scribe Disclosure  I, Ramsey Baker, am serving as a scribe to document services personally performed by Dr. Garrick Loyd, based on data collection and the provider's statements  to me.     Start Time: 5:40 PM   End Time: 5:48 PM     I spent a total of 8 min face to face with Eloise Johnson during today's office visit. About 50% of the time was spent counseling the patient and/or coordinating care regarding their allergy.      Again, thank you for allowing me to participate in the care of your patient.      Sincerely,    Garrick Loyd MD

## 2019-04-23 ENCOUNTER — DOCUMENTATION ONLY (OUTPATIENT)
Dept: SLEEP MEDICINE | Facility: CLINIC | Age: 49
End: 2019-04-23

## 2019-04-23 DIAGNOSIS — R09.81 NASAL CONGESTION: ICD-10-CM

## 2019-04-23 DIAGNOSIS — G47.33 OSA (OBSTRUCTIVE SLEEP APNEA): ICD-10-CM

## 2019-04-23 NOTE — PROGRESS NOTES
6 month [facility] Recheck Visit     Diagnostic AHI: 21.6  PSG    Message left for patient to return call     Assessment: Pt not meeting objective benchmarks for compliance     Action plan: waiting for patient to return call.   pt to follow up per provider request       Device type: Auto-CPAP  PAP settings: CPAP min 5.0 cm  H20     CPAP max 18.0 cm  H20  Objective measures: No use since 12/15/18      Objective measure goal  Compliance   Goal >70%  Leak   Goal < 24 lpm  AHI  Goal < 5  Usage  Goal >240

## 2019-05-16 ENCOUNTER — TELEPHONE (OUTPATIENT)
Dept: ORTHOPEDICS | Facility: CLINIC | Age: 49
End: 2019-05-16

## 2019-05-16 DIAGNOSIS — M25.561 RIGHT KNEE PAIN, UNSPECIFIED CHRONICITY: Primary | ICD-10-CM

## 2019-05-16 NOTE — TELEPHONE ENCOUNTER
[facility] Call Center    Phone Message    May a detailed message be left on voicemail: yes    Reason for Call: Patient is requesting a new PT order.  Please advise.  Thank you.    Action Taken: Message routed to:  Adult Clinics: Orthopedics p [phone]

## 2019-05-23 NOTE — TELEPHONE ENCOUNTER
Patient call requesting order for PT. That the previous order has . Patient states no new injury, last PT appt was in 2018. He states that previously it had worked and wants to return to PT routine but needs new orders placed. Advised patient that per provider that new orders placed and if he is still symptomatic without improvement from therapy to return to clinic to follow up with Dr Davila. Danita Bernabe RN

## 2019-06-01 ENCOUNTER — THERAPY VISIT (OUTPATIENT)
Dept: PHYSICAL THERAPY | Facility: CLINIC | Age: 49
End: 2019-06-01
Payer: COMMERCIAL

## 2019-06-01 DIAGNOSIS — M25.561 RIGHT KNEE PAIN, UNSPECIFIED CHRONICITY: ICD-10-CM

## 2019-06-01 DIAGNOSIS — M25.561 RIGHT KNEE PAIN: ICD-10-CM

## 2019-06-01 PROCEDURE — 97110 THERAPEUTIC EXERCISES: CPT | Mod: GP | Performed by: PHYSICAL THERAPIST

## 2019-06-01 PROCEDURE — 97112 NEUROMUSCULAR REEDUCATION: CPT | Mod: GP | Performed by: PHYSICAL THERAPIST

## 2019-06-01 PROCEDURE — 97161 PT EVAL LOW COMPLEX 20 MIN: CPT | Mod: GP | Performed by: PHYSICAL THERAPIST

## 2019-06-01 ASSESSMENT — ACTIVITIES OF DAILY LIVING (ADL)
GO UP STAIRS: ACTIVITY IS NOT DIFFICULT
HOW_WOULD_YOU_RATE_THE_CURRENT_FUNCTION_OF_YOUR_KNEE_DURING_YOUR_USUAL_DAILY_ACTIVITIES_ON_A_SCALE_FROM_0_TO_100_WITH_100_BEING_YOUR_LEVEL_OF_KNEE_FUNCTION_PRIOR_TO_YOUR_INJURY_AND_0_BEING_THE_INABILITY_TO_PERFORM_ANY_OF_YOUR_USUAL_DAILY_ACTIVITIES?: 95
GO DOWN STAIRS: ACTIVITY IS NOT DIFFICULT
KNEE_ACTIVITY_OF_DAILY_LIVING_SUM: 61
STAND: ACTIVITY IS NOT DIFFICULT
PAIN: I HAVE THE SYMPTOM BUT IT DOES NOT AFFECT MY ACTIVITY
KNEE_ACTIVITY_OF_DAILY_LIVING_SCORE: 87.14
SWELLING: I DO NOT HAVE THE SYMPTOM
WEAKNESS: I HAVE THE SYMPTOM BUT IT DOES NOT AFFECT MY ACTIVITY
AS_A_RESULT_OF_YOUR_KNEE_INJURY,_HOW_WOULD_YOU_RATE_YOUR_CURRENT_LEVEL_OF_DAILY_ACTIVITY?: NEARLY NORMAL
STIFFNESS: I HAVE THE SYMPTOM BUT IT DOES NOT AFFECT MY ACTIVITY
WALK: ACTIVITY IS NOT DIFFICULT
SIT WITH YOUR KNEE BENT: ACTIVITY IS MINIMALLY DIFFICULT
HOW_WOULD_YOU_RATE_THE_OVERALL_FUNCTION_OF_YOUR_KNEE_DURING_YOUR_USUAL_DAILY_ACTIVITIES?: NEARLY NORMAL
KNEEL ON THE FRONT OF YOUR KNEE: ACTIVITY IS MINIMALLY DIFFICULT
SQUAT: ACTIVITY IS NOT DIFFICULT
RISE FROM A CHAIR: ACTIVITY IS MINIMALLY DIFFICULT
GIVING WAY, BUCKLING OR SHIFTING OF KNEE: I HAVE THE SYMPTOM BUT IT DOES NOT AFFECT MY ACTIVITY
LIMPING: THE SYMPTOM AFFECTS MY ACTIVITY SLIGHTLY
RAW_SCORE: 61

## 2019-06-01 NOTE — PROGRESS NOTES
[facility] Initial Evaluation  Subjective:  The history is provided by the patient.   Eloise He is a 49 year old male with a right knee condition.        Patient has history of a R meniscus tear.  Received physical therapy and symptoms improved.  Patient has been going to the gym.  About a month ago noticed stiffness behind the knee.  Sometimes the knee also feels stiff with rising sit to stand after sitting at work.  The right knee also feels weaker than the left.  Received physical therapy orders 5.23.2019..    Patient reports pain:  Medial and posterior.    Pain is described as other (stiffness. soreness) and is intermittent and reported as 1/10 (0 soreness currently or since about 1 month ago.  1/10 symptoms with certain activities.).     Symptoms are exacerbated by bending/squatting and other (sitting on floor)   Since onset symptoms are gradually improving.  Special tests:  MRI (Meniscus tear).  Previous treatment includes physical therapy.  There was significant improvement following previous treatment.  General health as reported by patient is good.  Pertinent medical history includes:  High blood pressure.  Medical allergies: no.  Other surgeries include:  None reported.  Current medications:  Other (cholesterol medications).  Current occupation is Computer work.  Patient is working in normal job without restrictions.  Primary job tasks include:  Prolonged sitting.    Barriers include:  None as reported by the patient.    Red flags:  None as reported by the patient.                        Objective:  System                                                Knee Evaluation:  ROM:  Strength wnl knee: HIp abd: 5/5 L, 4/5 R.  Hip ext: 5/5 B.  Glut max: 5/5 L, 4+/5 R.  AROM    Hyperextension:  Left:  3    Right: 2  Extension:  Left: 0    Right:  0  Flexion: Left: 132    Right: 132    Pain: End range discomfort on R with flexion.    Strength:     Extension:  Left: 5/5    Pain:-      Right: 5/5     "Pain:-  Flexion:  Left: 5/5    Pain:-      Right: 5/5    Pain:-                  Functional Testing:          Quad:    Single Leg Squat:  Left:       Right:        Bilateral Leg Squat:  Slight shift to L  Normal control    Retro Step Up: Left:  6\"    Right: 6\", mild loss of control    % of Uninvolved:   Proprioception:   Stork Balance Test:  Left:  38\"  Right:  48\"  % of Uninvolved:           General     ROS    Assessment/Plan:    Patient is a 49 year old male with right side knee complaints.    Patient has the following significant findings with corresponding treatment plan.                Diagnosis 1:  R knee pain  Pain -  home program  Decreased strength - therapeutic exercise, therapeutic activities and home program  Impaired muscle performance - neuro re-education and home program  Decreased function - therapeutic activities and home program    Therapy Evaluation Codes:   1) History comprised of:   Personal factors that impact the plan of care:      None.    Comorbidity factors that impact the plan of care are:      None.     Medications impacting care: None.  2) Examination of Body Systems comprised of:   Body structures and functions that impact the plan of care:      Knee.   Activity limitations that impact the plan of care are:      Squatting/kneeling.  3) Clinical presentation characteristics are:   Stable/Uncomplicated.  4) Decision-Making    Low complexity using standardized patient assessment instrument and/or measureable assessment of functional outcome.  Cumulative Therapy Evaluation is: Low complexity.    Previous and current functional limitations:  (See Goal Flow Sheet for this information)    Short term and Long term goals: (See Goal Flow Sheet for this information)     Communication ability:  Patient appears to be able to clearly communicate and understand verbal and written communication and follow directions correctly.  Treatment Explanation - The following has been discussed with the patient:  "  RX ordered/plan of care  Anticipated outcomes  Possible risks and side effects  This patient would benefit from PT intervention to resume normal activities.   Rehab potential is good.    Frequency:  1 X a month, once daily  Duration:  for 3 months  Discharge Plan:  Achieve all LTG.  Independent in home treatment program.  Reach maximal therapeutic benefit.    Please refer to the daily flowsheet for treatment today, total treatment time and time spent performing 1:1 timed codes.

## 2019-07-24 ENCOUNTER — OFFICE VISIT (OUTPATIENT)
Dept: OPTOMETRY | Facility: CLINIC | Age: 49
End: 2019-07-24
Payer: COMMERCIAL

## 2019-07-24 DIAGNOSIS — H52.4 PRESBYOPIA: ICD-10-CM

## 2019-07-24 DIAGNOSIS — H52.13 HIGH MYOPIA, BOTH EYES: ICD-10-CM

## 2019-07-24 DIAGNOSIS — H04.123 DRY EYE SYNDROME OF BOTH EYES: ICD-10-CM

## 2019-07-24 DIAGNOSIS — Z01.00 EXAMINATION OF EYES AND VISION: Primary | ICD-10-CM

## 2019-07-24 DIAGNOSIS — H52.223 REGULAR ASTIGMATISM OF BOTH EYES: ICD-10-CM

## 2019-07-24 PROCEDURE — 92014 COMPRE OPH EXAM EST PT 1/>: CPT | Performed by: OPTOMETRIST

## 2019-07-24 PROCEDURE — 92015 DETERMINE REFRACTIVE STATE: CPT | Performed by: OPTOMETRIST

## 2019-07-24 ASSESSMENT — REFRACTION_WEARINGRX
OS_CYLINDER: +1.25
OD_CYLINDER: +1.00
OS_AXIS: 100
OD_ADD: +1.75
OS_ADD: +1.75
OS_SPHERE: -10.50
OD_SPHERE: -10.75
OD_AXIS: 080

## 2019-07-24 ASSESSMENT — TONOMETRY
OD_IOP_MMHG: 15
IOP_METHOD: TONOPEN
OS_IOP_MMHG: 16

## 2019-07-24 ASSESSMENT — REFRACTION_MANIFEST
OS_CYLINDER: +1.00
OD_CYLINDER: +1.00
OS_SPHERE: -10.50
OS_ADD: +2.00
OD_ADD: +2.00
OD_SPHERE: -10.50
OD_AXIS: 080
OS_AXIS: 100

## 2019-07-24 ASSESSMENT — VISUAL ACUITY
OD_SC+: -1
OS_CC: 20/20-2
OD_CC: 20/30
OS_CC: 20/25
CORRECTION_TYPE: GLASSES
OS_SC: 20/100-1
OD_CC: 20/20
METHOD: SNELLEN - LINEAR
OD_SC: 20/125

## 2019-07-24 ASSESSMENT — TEAR MENISCUS
OD_TEAR_MENISCUS: DECREASED
OS_TEAR_MENISCUS: DECREASED

## 2019-07-24 ASSESSMENT — SLIT LAMP EXAM - LIDS
COMMENTS: MEIBOMIAN GLAND DYSFUNCTION, DERMATOCHALASIS
COMMENTS: MEIBOMIAN GLAND DYSFUNCTION, DERMATOCHALASIS

## 2019-07-24 ASSESSMENT — CUP TO DISC RATIO
OS_RATIO: 0.3
OD_RATIO: 0.3

## 2019-07-24 ASSESSMENT — PUNCTA - ASSESSMENT
OS_PUNCTA: NORMAL
OD_PUNCTA: NORMAL

## 2019-07-24 ASSESSMENT — EXTERNAL EXAM - LEFT EYE: OS_EXAM: NORMAL

## 2019-07-24 ASSESSMENT — CONF VISUAL FIELD
OD_NORMAL: 1
OS_NORMAL: 1

## 2019-07-24 ASSESSMENT — EXTERNAL EXAM - RIGHT EYE: OD_EXAM: NORMAL

## 2019-07-24 NOTE — PATIENT INSTRUCTIONS
Refresh- 1 drop both eyes 4 x day.    Eyeglass prescription given.  Work space glasses are an option.    Return in 1 year for a complete eye exam or sooner if needed.    Ar Medina, OD    Use one drop of artificial tears both eyes 3-4 x daily.  Continue to use the drops regardless if your eyes are comfortable.  Artificial tears work best as a preventative and not as well after your eyes are starting to bother you.  It may take 4- 6 weeks of using the drops before you notice improvement.  If after that time you are still having problems schedule an appointment for an evaluation and discussion of different treatments such as Restasis or Xiidra.  Dry eyes are a chronic condition and you may have more symptoms at certain times of the year.    Excess tearing can be due to the right tears not working properly or a blockage in the tear drainage system.  You can try using artificial tears 1 drop right eye 3-4 x day.  If the excess tearing is bothersome after 3-4 weeks and that doesn't help we can send you for further testing on the puncta which allows the tears to drain.  This would entail a referral to our oculo plastic specialist at the [facility].    Brands of drops that are recommended are:    Systane Complete  Systane Ultra  Systane Balance  Refresh Advanced Optive  Blink    If you are using drops more than 4 x day or have sensitivities to preservatives I recommend non preserved artificial tears.  These come in 1 use vials.  They can be used every 1-2 hours.    Brands of drops that are recommended are:    Systane- preservative free  Refresh-  preservative free  Blink- preservative free    Gels or ointment can be used at night.    Brands recommended are:    Systane Gel  Refresh Gel  Blink Gel  Genteal Gel    Systane night time (ointment)  Refresh Celluvisc  Refresh PM (ointment)      Visine, Clear Eyes or Murine (drops that get the red out) can irritate the eyes and cause a rebound effect where the eyes  become more red and you end up using more drops.  Avoid drops containing tetrahydrozoline, naphazoline, phenylephrine, oxymetazoline.      OTC Lumify is a newer product that gives immediate redness relief with out the rebound effect.  Use as needed to take the redness out.    Artificial tears may be used with other drops (such as allergy, glaucoma, antibiotics) around the same time.  Be sure to wait 5 minutes in between drops.    Heat to the eyelids can also improve your symptoms of dry eyes.  Deedee heat masks can be purchased at [employer] to be used daily for 10-15 minutes.  Other options are gel masks that can be put in the microwave and purchased at most pharmacies.      The affects of the dilating drops last for 4- 6 hours.  You will be more sensitive to light and vision will be blurry up close.  Mydriatic sunglasses were given if needed.      Optometry Providers       Clinic Locations                                 Telephone Number   Dr. Aubree Rueda and [place]   [place] [phone]     [place] [facility] Hours:                [facility] Hours:       [facility] Hours:   [address] [address]   [address] [facility]     [address] [place] [address]   [place]ued[place] [address]    [place] [address]  Phone: [phone]                    Phone: [phone]     Phone: [phone]                      Monday 8:00-7:00                          Monday 8:00-7:00                          Monday 8:00-7:00              Tuesday 8:00-6:00                          Tuesday 8:00-7:00                          Tuesday 8:00-7:00              Wednesday 8:00-6:00                  Wednesday 8:00-7:00                   Wednesday 8:00-7:00      Thursday 8:00-6:00                        Thursday 8:00-7:00                         Thursday 8:00-7:00            Friday 8:00-5:00                              Friday 8:00-5:00                               Friday 8:00-5:00    [facility] Hours:   [address] [place] [address]  [phone]    Monday 8:00-7:00  Tuesday 8:00-7:00  Wednesday 8:00-7:00  Thursday 8:00-7:00  Friday 8:00-5:00  Please log on to [email or web address] to order your contact lenses.  The link is found on the [facility] page.  As always, Thank you for trusting us with your health care needs!

## 2019-07-24 NOTE — PROGRESS NOTES
Chief Complaint   Patient presents with     Annual Eye Exam         Last Eye Exam: 1-  Dilated Previously: Yes    What are you currently using to see?  glasses       Distance Vision Acuity: Satisfied with vision    Near Vision Acuity: Not satisfied     Eye Comfort: dry  Do you use eye drops? : Yes: uses eye lubricant   Occupation or Hobbies: computers all day     Cydney Cavanaugh Optometric Assistant, A.B.O.C.          Medical, surgical and family histories reviewed and updated 7/24/2019.       OBJECTIVE: See Ophthalmology exam    ASSESSMENT:    ICD-10-CM    1. Examination of eyes and vision Z01.00 EYE EXAM (SIMPLE-NONBILLABLE)   2. Dry eye syndrome of both eyes H04.123 REFRACTION   3. High myopia, both eyes H52.13 REFRACTION   4. Presbyopia H52.4 REFRACTION   5. Regular astigmatism of both eyes H52.223 REFRACTION      PLAN:     Patient Instructions   Refresh- 1 drop both eyes 4 x day.    Eyeglass prescription given.  Work space glasses are an option.    Return in 1 year for a complete eye exam or sooner if needed.    Ar Medina, OD

## 2019-07-26 DIAGNOSIS — E78.2 MIXED HYPERLIPIDEMIA: Chronic | ICD-10-CM

## 2019-07-26 RX ORDER — ATORVASTATIN CALCIUM 10 MG/1
10 TABLET, FILM COATED ORAL DAILY
Qty: 90 TABLET | Refills: 0 | Status: SHIPPED | OUTPATIENT
Start: 2019-07-26 | End: 2019-11-20

## 2019-09-18 PROBLEM — M25.561 RIGHT KNEE PAIN: Status: RESOLVED | Noted: 2019-06-01 | Resolved: 2019-09-18

## 2019-10-29 ENCOUNTER — PRE VISIT (OUTPATIENT)
Dept: CARDIOLOGY | Facility: CLINIC | Age: 49
End: 2019-10-29

## 2019-11-15 DIAGNOSIS — E78.2 MIXED HYPERLIPIDEMIA: Primary | Chronic | ICD-10-CM

## 2019-11-20 ENCOUNTER — TELEPHONE (OUTPATIENT)
Dept: CARDIOLOGY | Facility: CLINIC | Age: 49
End: 2019-11-20

## 2019-11-20 ENCOUNTER — OFFICE VISIT (OUTPATIENT)
Dept: CARDIOLOGY | Facility: CLINIC | Age: 49
End: 2019-11-20
Attending: INTERNAL MEDICINE
Payer: COMMERCIAL

## 2019-11-20 VITALS
DIASTOLIC BLOOD PRESSURE: 90 MMHG | BODY MASS INDEX: 27.55 KG/M2 | OXYGEN SATURATION: 97 % | HEIGHT: 71 IN | SYSTOLIC BLOOD PRESSURE: 147 MMHG | WEIGHT: 196.8 LBS | HEART RATE: 75 BPM

## 2019-11-20 DIAGNOSIS — E78.6 HDL DEFICIENCY: Primary | Chronic | ICD-10-CM

## 2019-11-20 DIAGNOSIS — E78.2 MIXED HYPERLIPIDEMIA: Chronic | ICD-10-CM

## 2019-11-20 DIAGNOSIS — I42.9 CARDIOMYOPATHY OF UNDETERMINED TYPE (H): Chronic | ICD-10-CM

## 2019-11-20 LAB
ALT SERPL W P-5'-P-CCNC: 43 U/L (ref 0–70)
CHOLEST SERPL-MCNC: 131 MG/DL
HDLC SERPL-MCNC: 31 MG/DL
LDLC SERPL CALC-MCNC: 50 MG/DL
NONHDLC SERPL-MCNC: 100 MG/DL
TRIGL SERPL-MCNC: 249 MG/DL

## 2019-11-20 PROCEDURE — 99214 OFFICE O/P EST MOD 30 MIN: CPT | Performed by: INTERNAL MEDICINE

## 2019-11-20 PROCEDURE — 84460 ALANINE AMINO (ALT) (SGPT): CPT | Performed by: INTERNAL MEDICINE

## 2019-11-20 PROCEDURE — 80061 LIPID PANEL: CPT | Performed by: INTERNAL MEDICINE

## 2019-11-20 PROCEDURE — 36415 COLL VENOUS BLD VENIPUNCTURE: CPT | Performed by: INTERNAL MEDICINE

## 2019-11-20 RX ORDER — ROSUVASTATIN CALCIUM 10 MG/1
10 TABLET, COATED ORAL DAILY
Qty: 90 TABLET | Refills: 3 | Status: SHIPPED | OUTPATIENT
Start: 2019-11-20 | End: 2020-04-14 | Stop reason: ALTCHOICE

## 2019-11-20 ASSESSMENT — MIFFLIN-ST. JEOR: SCORE: 1774.81

## 2019-11-20 NOTE — PROGRESS NOTES
HPI and Plan:   See dictation    Orders Placed This Encounter   Procedures     Basic metabolic panel     TSH with free T4 reflex     Lipid Profile     ALT     Basic metabolic panel     Lipid Profile     Follow-Up with Cardiac Advanced Practice Provider     Follow-Up with Cardiologist     Echocardiogram Complete       Orders Placed This Encounter   Medications     rosuvastatin (CRESTOR) 10 MG tablet     Sig: Take 1 tablet (10 mg) by mouth daily     Dispense:  90 tablet     Refill:  3       Medications Discontinued During This Encounter   Medication Reason     ketoconazole (NIZORAL) 2 % external shampoo Stopped by Patient     ketoconazole (NIZORAL) 2 % external shampoo Stopped by Patient     ketoconazole (NIZORAL) 2 % external cream Stopped by Patient     tobramycin-dexamethasone (TOBRADEX) 0.3-0.1 % ophthalmic ointment Stopped by Patient     atorvastatin (LIPITOR) 10 MG tablet          Encounter Diagnoses   Name Primary?     Mixed hyperlipidemia      Cardiomyopathy of undetermined type (H)      HDL deficiency Yes       CURRENT MEDICATIONS:  Current Outpatient Medications   Medication Sig Dispense Refill     aspirin EC 81 MG tablet Take 1 tablet (81 mg) by mouth every other day       Multiple Vitamin (MULTI-VITAMIN) per tablet Take 1 tablet by mouth daily.       omega-3 fatty acids 1200 MG capsule Take 2 capsules by mouth daily.       pimecrolimus (ELIDEL) 1 % external cream Apply twice daily to affected areas until clear 100 g 3     rosuvastatin (CRESTOR) 10 MG tablet Take 1 tablet (10 mg) by mouth daily 90 tablet 3       ALLERGIES   No Known Allergies    PAST MEDICAL HISTORY:  Past Medical History:   Diagnosis Date     Anxiety 05/01/2017    resolved 2018     Psychophysiological insomnia 05/01/2017    resolved 2018       PAST SURGICAL HISTORY:  Past Surgical History:   Procedure Laterality Date     COLONOSCOPY WITH CO2 INSUFFLATION N/A 10/24/2014    Procedure: COLONOSCOPY WITH CO2 INSUFFLATION;  Surgeon: Duane,  Ariel Segal MD;  Location: [facility] OR     HC TOOTH EXTRACTION W/FORCEP         FAMILY HISTORY:  Family History   Problem Relation Age of Onset     C.A.D. 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, teachers/students   Social Needs     Financial resource strain: None     Food insecurity:     Worry: None     Inability: None     Transportation needs:     Medical: None     Non-medical: None   Tobacco Use     Smoking status: Former Smoker     Packs/day: 1.00     Years: 2.00     Pack years: 2.00     Types: Cigarettes     Smokeless tobacco: Never Used   Substance and Sexual Activity     Alcohol use: Yes     Alcohol/week: 0.8 - 1.7 standard drinks     Types: 1 - 2 Standard drinks or equivalent per week     Comment: socially     Drug use: No     Sexual activity: Not Currently     Comment: not currently    Lifestyle     Physical activity:     Days per week: None     Minutes per session: None     Stress: None   Relationships     Social connections:     Talks on phone: None     Gets together: None     Attends Jewish service: None     Active member of club or organization: None     Attends meetings of clubs or organizations: None     Relationship status: None     Intimate partner violence:     Fear of current or ex partner: None     Emotionally abused: None     Physically abused: None     Forced sexual activity: None   Other Topics Concern      Service Not Asked     Blood Transfusions Not Asked     Caffeine Concern Yes  "    Comment: tea 1-2 per day     Occupational Exposure Not Asked     Hobby Hazards Not Asked     Sleep Concern No     Stress Concern No     Weight Concern No     Special Diet No     Back Care Not Asked     Exercise No     Bike Helmet Not Asked     Seat Belt Yes     Self-Exams Not Asked     Parent/sibling w/ CABG, MI or angioplasty before 65F 55M? Not Asked   Social History Narrative     None       Review of Systems:  Skin:  Negative       Eyes:  Positive for glasses    ENT:  Negative      Respiratory:  Negative       Cardiovascular:  Negative      Gastroenterology: Negative      Genitourinary:  Negative      Musculoskeletal:  Negative      Neurologic:  Negative      Psychiatric:  Negative      Heme/Lymph/Imm:  Negative      Endocrine:  Negative        Physical Exam:  Vitals: BP (!) 147/90 (BP Location: Left arm, Patient Position: Sitting, Cuff Size: Adult Regular)   Pulse 75   Ht 1.803 m (5' 11\")   Wt 89.3 kg (196 lb 12.8 oz)   SpO2 97%   BMI 27.45 kg/m      Constitutional:  cooperative, alert and oriented, well developed, well nourished, in no acute distress        Skin:  warm and dry to the touch, no apparent skin lesions or masses noted          Head:  normocephalic, no masses or lesions        Eyes:  pupils equal and round, conjunctivae and lids unremarkable, sclera white, no xanthalasma, EOMS intact, no nystagmus        Lymph:      ENT:  no pallor or cyanosis, dentition good        Neck:  carotid pulses are full and equal bilaterally;no carotid bruit        Respiratory:  normal breath sounds, clear to auscultation, normal A-P diameter, normal symmetry, normal respiratory excursion, no use of accessory muscles         Cardiac: regular rhythm;normal S1 and S2;no S3 or S4;no murmurs, gallops or rubs detected                pulses full and equal                                        GI:           Extremities and Muscular Skeletal:  no edema;no spinal abnormalities noted;normal muscle strength and tone         "      Neurological:  no gross motor deficits        Psych:  affect appropriate, oriented to time, person and place        CC  Marshal Snyder MD  [address]  [place] [id number]

## 2019-11-20 NOTE — LETTER
11/20/2019    Fabian Crowder MD  No address on file    RE: Eloise Alex       Dear Colleague,    I had the pleasure of seeing Eloise Johnson in the [facility].    HPI and Plan:   See dictation    Orders Placed This Encounter   Procedures     Basic metabolic panel     TSH with free T4 reflex     Lipid Profile     ALT     Basic metabolic panel     Lipid Profile     Follow-Up with Cardiac Advanced Practice Provider     Follow-Up with Cardiologist     Echocardiogram Complete       Orders Placed This Encounter   Medications     rosuvastatin (CRESTOR) 10 MG tablet     Sig: Take 1 tablet (10 mg) by mouth daily     Dispense:  90 tablet     Refill:  3       Medications Discontinued During This Encounter   Medication Reason     ketoconazole (NIZORAL) 2 % external shampoo Stopped by Patient     ketoconazole (NIZORAL) 2 % external shampoo Stopped by Patient     ketoconazole (NIZORAL) 2 % external cream Stopped by Patient     tobramycin-dexamethasone (TOBRADEX) 0.3-0.1 % ophthalmic ointment Stopped by Patient     atorvastatin (LIPITOR) 10 MG tablet          Encounter Diagnoses   Name Primary?     Mixed hyperlipidemia      Cardiomyopathy of undetermined type (H)      HDL deficiency Yes       CURRENT MEDICATIONS:  Current Outpatient Medications   Medication Sig Dispense Refill     aspirin EC 81 MG tablet Take 1 tablet (81 mg) by mouth every other day       Multiple Vitamin (MULTI-VITAMIN) per tablet Take 1 tablet by mouth daily.       omega-3 fatty acids 1200 MG capsule Take 2 capsules by mouth daily.       pimecrolimus (ELIDEL) 1 % external cream Apply twice daily to affected areas until clear 100 g 3     rosuvastatin (CRESTOR) 10 MG tablet Take 1 tablet (10 mg) by mouth daily 90 tablet 3       ALLERGIES   No Known Allergies    PAST MEDICAL HISTORY:  Past Medical History:   Diagnosis Date     Anxiety 05/01/2017    resolved 2018     Psychophysiological insomnia 05/01/2017    resolved 2018       PAST SURGICAL  HISTORY:  Past Surgical History:   Procedure Laterality Date     COLONOSCOPY WITH CO2 INSUFFLATION N/A 10/24/2014    Procedure: COLONOSCOPY WITH CO2 INSUFFLATION;  Surgeon: Duane, William Charles, MD;  Location: [facility] OR     HC TOOTH EXTRACTION W/FORCEP         FAMILY HISTORY:  Family History   Problem Relation Age of Onset     C.A.D. 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, teachers/students   Social Needs     Financial resource strain: None     Food insecurity:     Worry: None     Inability: None     Transportation needs:     Medical: None     Non-medical: None   Tobacco Use     Smoking status: Former Smoker     Packs/day: 1.00     Years: 2.00     Pack years: 2.00     Types: Cigarettes     Smokeless tobacco: Never Used   Substance and Sexual Activity     Alcohol use: Yes     Alcohol/week: 0.8 - 1.7 standard drinks     Types: 1 - 2 Standard drinks or equivalent per week     Comment: socially     Drug use: No     Sexual activity: Not Currently     Comment: not currently    Lifestyle     Physical activity:     Days per week: None     Minutes per session: None     Stress: None   Relationships     Social connections:     Talks on phone: None     Gets together: None     Attends Nondenominational service: None     Active member of club or organization: None     Attends meetings of clubs or organizations: None     Relationship status: None     Intimate partner violence:     Fear of current or ex partner: None     Emotionally  "abused: None     Physically abused: None     Forced sexual activity: None   Other Topics Concern      Service Not Asked     Blood Transfusions Not Asked     Caffeine Concern Yes     Comment: tea 1-2 per day     Occupational Exposure Not Asked     Hobby Hazards Not Asked     Sleep Concern No     Stress Concern No     Weight Concern No     Special Diet No     Back Care Not Asked     Exercise No     Bike Helmet Not Asked     Seat Belt Yes     Self-Exams Not Asked     Parent/sibling w/ CABG, MI or angioplasty before 65F 55M? Not Asked   Social History Narrative     None       Review of Systems:  Skin:  Negative       Eyes:  Positive for glasses    ENT:  Negative      Respiratory:  Negative       Cardiovascular:  Negative      Gastroenterology: Negative      Genitourinary:  Negative      Musculoskeletal:  Negative      Neurologic:  Negative      Psychiatric:  Negative      Heme/Lymph/Imm:  Negative      Endocrine:  Negative        Physical Exam:  Vitals: BP (!) 147/90 (BP Location: Left arm, Patient Position: Sitting, Cuff Size: Adult Regular)   Pulse 75   Ht 1.803 m (5' 11\")   Wt 89.3 kg (196 lb 12.8 oz)   SpO2 97%   BMI 27.45 kg/m       Constitutional:  cooperative, alert and oriented, well developed, well nourished, in no acute distress        Skin:  warm and dry to the touch, no apparent skin lesions or masses noted          Head:  normocephalic, no masses or lesions        Eyes:  pupils equal and round, conjunctivae and lids unremarkable, sclera white, no xanthalasma, EOMS intact, no nystagmus        Lymph:      ENT:  no pallor or cyanosis, dentition good        Neck:  carotid pulses are full and equal bilaterally;no carotid bruit        Respiratory:  normal breath sounds, clear to auscultation, normal A-P diameter, normal symmetry, normal respiratory excursion, no use of accessory muscles         Cardiac: regular rhythm;normal S1 and S2;no S3 or S4;no murmurs, gallops or rubs detected              "   pulses full and equal                                        GI:           Extremities and Muscular Skeletal:  no edema;no spinal abnormalities noted;normal muscle strength and tone              Neurological:  no gross motor deficits        Psych:  affect appropriate, oriented to time, person and place        CC  Marshal Snyder MD  [address]  [place] [id number]                Thank you for allowing me to participate in the care of your patient.      Sincerely,     Marshal Snyder MD     [facility]    cc:   Marshal Snyder MD  [address]  [place] [address]         Yes

## 2019-11-20 NOTE — TELEPHONE ENCOUNTER
Voicemail received from patient requesting a blood sugar be added on to his labs drawn this morning. He is scheduled to see Dr Snyder this afternoon for follow up.     Returned patient's call to clarify if he needs an A1c or a blood glucose level, LVM to call back. Routed to Dr Snyder for review.

## 2019-11-21 NOTE — PROGRESS NOTES
Service Date: 11/20/2019      HISTORY OF PRESENT ILLNESS:  Mr. Johnson is a very nice 49-year-old gentleman with past medical history significant for his father dying of a heart attack at age 42.  He has significant HDL deficiency and hypertriglyceridemia.  Stress test in 2009 demonstrated no evidence of ischemia.  Eloise has always been quite reluctant to go on medications.  Ultimately, I did convince him to go on baby dose aspirin and 10 mg of atorvastatin.  He is also unwilling to do any sort of stress test that would involve radiation including a calcium scoring test.  In 2015, he underwent an MRI which demonstrated normal perfusion.  He had mildly decreased left ventricular function, estimated ejection fraction of 48%.  A stress echo from 2015 also demonstrated decreased left ventricular function with ejection fraction of 40%-45%, consistent with a cardiomyopathy of unclear etiology.      Eloise returns to clinic stating that he is doing better with exercise.  He is not doing so well with diet and has not managed to lose any weight.  He is having no chest, arm, neck, jaw or shoulder discomfort.  No dyspnea on exertion, orthopnea or PND.  No palpitations, lightheadedness, dizziness, syncope or near-syncope.  He notes no side effects or problems with his current medical regimen.      ASSESSMENT AND PLAN:  Eloise appears to be doing well from a cardiac standpoint without clinical evidence of ischemia, heart failure or significant arrhythmia.      Blood pressure remains high, and Eloise is still reluctant to go on any medications.  I have told him we will allow him 4 months of regular exercise, low-salt diet and trying to lose weight.  I will have him follow up with my TOMASA.  If his blood pressure is still high, we will initiate antihypertensive medications.  I did assure him that we would use low-dose medicines to begin with.      Fasting lipid profile still shows marked hypertriglyceridemia at 249, his total cholesterol is  131, HDL is 31, LDL is 50, and this is on his atorvastatin 10.  I have suggested we at least change to rosuvastatin in an effort to raise his HDL further.  We also talked about how regular exercise and weight loss will help with this as well.  I would like to check a thyroid function tests, which were checked several years ago and were normal.  He drinks alcohol only occasionally.  The blood work was fasting.  I will also check a glucose on the blood work to make sure it is not high.  His glucose has always been borderline in the past but never elevated to any significant degree.  It just appears he may have metabolic syndrome genetically.  I will have him come back in 4 months to see my TOMASA, again to see how he is doing with his blood pressure.  We will also recheck a fasting lipid profile with ALT on the rosuvastatin 10 mg.      Basic metabolic profile was last checked in 2018 and appeared to be normal.      I reviewed his medications.  We will continue the remainder as is.         ABDULAZIZ SMITH MD, FACC             D: 2019   T: 2019   MT: JAIME      Name:     BRIGHT DONIS   MRN:      [id number]        Account:      [id number]   :      1970           Service Date: 2019      Document: [id number]

## 2019-11-21 NOTE — TELEPHONE ENCOUNTER
Spoke with Patient who states he saw Dr. Snyder that afternoon. Recommendation was to Patient to return in 4 months with labs and TOMASA OV.  Patient will call with any questions or concerns.

## 2020-03-02 ENCOUNTER — HEALTH MAINTENANCE LETTER (OUTPATIENT)
Age: 50
End: 2020-03-02

## 2020-03-25 ENCOUNTER — TELEPHONE (OUTPATIENT)
Dept: CARDIOLOGY | Facility: CLINIC | Age: 50
End: 2020-03-25

## 2020-03-25 NOTE — TELEPHONE ENCOUNTER
Office Visit: 03-     ~ Cancel/postpone visit (3/30/20) if BP < 140 mmHg at home otherwise convert to telephone visit thanks, REBECCA    Spoke with patient. Patient is asymptomatic and has no concerns. Patient states is BP has been <140 mmHg. Due to COVID-19, patient prefers to reschedule.     Patient's appointment status:  Appointment will be rescheduled to a later date       Vic GARCIA, BSN  [facility]

## 2020-04-13 ENCOUNTER — TELEPHONE (OUTPATIENT)
Dept: CARDIOLOGY | Facility: CLINIC | Age: 50
End: 2020-04-13

## 2020-04-13 DIAGNOSIS — E78.2 MIXED HYPERLIPIDEMIA: Primary | Chronic | ICD-10-CM

## 2020-04-13 NOTE — TELEPHONE ENCOUNTER
Patient called, left message that his rosuvastatin 10 mg was to expensive and wondering if he could change back to atorvastatin 10 mg daily.     Medication was changed at Dr. Snyder's last OV Nov 2019.    Attempted to contact Patient. Phone not accepting calls at this time.

## 2020-04-14 RX ORDER — ATORVASTATIN CALCIUM 10 MG/1
10 TABLET, FILM COATED ORAL DAILY
Qty: 90 TABLET | Refills: 2 | Status: SHIPPED | OUTPATIENT
Start: 2020-04-14 | End: 2021-11-04

## 2020-04-14 NOTE — TELEPHONE ENCOUNTER
Spoke to patient, denies any myalgias on rosuvastatin, states switching back to atorvastatin is simply a cost issue. OK per Dr Snyder to go back to atorvastatin 10mg daily. Rx sent to [facility] in [place]. Pt to follow up again in the fall w/ OV & labs. Pt made aware that the clinic is offering telephone/virtual visits, should he have any changes/concerns in the interim. Pt verbalized understanding of above.

## 2020-04-14 NOTE — TELEPHONE ENCOUNTER
Left message for patient confirming message received. Also asked if he experienced any side effects ie myalgias on the rosuvastatin. Message to Dr Snyder with patient's request.     Pt is overdue for an TOMASA OV and labs per Epic orders.

## 2020-12-14 ENCOUNTER — HEALTH MAINTENANCE LETTER (OUTPATIENT)
Age: 50
End: 2020-12-14

## 2021-01-15 DIAGNOSIS — Z82.49 FAMILY HISTORY OF EARLY CAD: Chronic | ICD-10-CM

## 2021-01-15 DIAGNOSIS — I42.9 CARDIOMYOPATHY OF UNDETERMINED TYPE (H): Chronic | ICD-10-CM

## 2021-01-15 DIAGNOSIS — E78.2 MIXED HYPERLIPIDEMIA: Primary | Chronic | ICD-10-CM

## 2021-01-15 DIAGNOSIS — E78.6 HDL DEFICIENCY: Chronic | ICD-10-CM

## 2021-04-18 ENCOUNTER — HEALTH MAINTENANCE LETTER (OUTPATIENT)
Age: 51
End: 2021-04-18

## 2021-09-13 ENCOUNTER — TELEPHONE (OUTPATIENT)
Dept: CARDIOLOGY | Facility: CLINIC | Age: 51
End: 2021-09-13

## 2021-09-13 NOTE — TELEPHONE ENCOUNTER
Pt wondering if he needs an echo before his visit, states scheduling mentioned it but he does not normally get one.     Reviewed last OV note and follow up notes and no mention of echo. Left message for patient that plan at this time would just be for labs and visit. Pt to call back with any further questions or concerns.

## 2021-09-17 ENCOUNTER — OFFICE VISIT (OUTPATIENT)
Dept: OPTOMETRY | Facility: CLINIC | Age: 51
End: 2021-09-17
Payer: COMMERCIAL

## 2021-09-17 ENCOUNTER — TELEPHONE (OUTPATIENT)
Dept: CARDIOLOGY | Facility: CLINIC | Age: 51
End: 2021-09-17

## 2021-09-17 DIAGNOSIS — H52.13 HIGH MYOPIA, BOTH EYES: ICD-10-CM

## 2021-09-17 DIAGNOSIS — H04.123 DRY EYES: Primary | ICD-10-CM

## 2021-09-17 PROCEDURE — 92014 COMPRE OPH EXAM EST PT 1/>: CPT | Performed by: OPTOMETRIST

## 2021-09-17 PROCEDURE — 92015 DETERMINE REFRACTIVE STATE: CPT | Performed by: OPTOMETRIST

## 2021-09-17 RX ORDER — PARSLEY 450 MG
CAPSULE ORAL
COMMUNITY
End: 2022-10-21

## 2021-09-17 ASSESSMENT — REFRACTION_MANIFEST
OD_AXIS: 087
OD_ADD: +2.50
OS_CYLINDER: +1.00
OS_AXIS: 080
OS_SPHERE: -11.50
OD_SPHERE: -11.50
OS_ADD: +2.50
OD_CYLINDER: +0.75

## 2021-09-17 ASSESSMENT — REFRACTION_WEARINGRX
SPECS_TYPE: PAL
OS_ADD: +2.00
OD_SPHERE: -10.75
OD_AXIS: 087
OS_CYLINDER: +0.75
OD_CYLINDER: +1.00
OS_AXIS: 080
OD_ADD: +2.00
OS_SPHERE: -10.75

## 2021-09-17 ASSESSMENT — VISUAL ACUITY
OD_CC: 20/25
OS_CC: 20/20
CORRECTION_TYPE: GLASSES
OD_CC+: -1
OS_CC+: -3
METHOD: SNELLEN - LINEAR

## 2021-09-17 ASSESSMENT — EXTERNAL EXAM - RIGHT EYE: OD_EXAM: NORMAL

## 2021-09-17 ASSESSMENT — TONOMETRY
IOP_METHOD: ICARE
OD_IOP_MMHG: 13
OS_IOP_MMHG: 10

## 2021-09-17 ASSESSMENT — EXTERNAL EXAM - LEFT EYE: OS_EXAM: NORMAL

## 2021-09-17 ASSESSMENT — CONF VISUAL FIELD
OD_NORMAL: 1
OS_NORMAL: 1
METHOD: COUNTING FINGERS

## 2021-09-17 ASSESSMENT — CUP TO DISC RATIO
OD_RATIO: 0.3
OS_RATIO: 0.3

## 2021-09-17 NOTE — NURSING NOTE
Chief Complaints and History of Present Illnesses   Patient presents with     Annual Eye Exam       Chief Complaint(s) and History of Present Illness(es)     Annual Eye Exam     Laterality: both eyes    Associated symptoms: floaters (both eyes, stable ).  Negative for eye pain and flashes              Comments     Patient here for annual eye exam. Patient states he uses his eyes a lot at work at feels it is time to update his glasses. Prog. lenses, worn every day, thinks his near vision could improve. Eyes are dry and feel tired easily.   Patient uses lubricating drops as needed, not every day.   No Pain, No Flashes. Floaters appeared 20-30yrs ago in both eyes. No changes to the floaters.                     Neal Jones, Ophthalmic Assistant

## 2021-09-17 NOTE — TELEPHONE ENCOUNTER
Patient called wanting to know what labs will be drawn prior to Dr. Snyder's visit.  Labs ordered FLP, BMP.  Patient wanted to be sure a glucose is included.

## 2021-09-17 NOTE — PROGRESS NOTES
Assessment/Plan  (H04.123) Dry eyes  (primary encounter diagnosis)  Comment: With prolonged computer use  Plan: Discussed findings with patient. Recommended regular use of lubricating drops throughout the day. Return to clinic with worsening symptoms.     (H52.13) High myopia, both eyes  Plan: REFRACTION [[id number]]        Discussed findings with patient. New spectacle prescription dispensed to patient. Patient is welcome to return to clinic with prolonged adaptation difficulties. Return to clinic with new flashes, floaters, or curtain over vision.       Complete documentation of historical and exam elements from today's encounter can  be found in the full encounter summary report (not reduplicated in this progress  note). I personally obtained the chief complaint(s) and history of present illness. I  confirmed and edited as necessary the review of systems, past medical/surgical  history, family history, social history, and examination findings as documented by  others; and I examined the patient myself. I personally reviewed the relevant tests,  images, and reports as documented above. I formulated and edited as necessary the  assessment and plan and discussed the findings and management plan with the  patient and family.    Crispin Platt, OD

## 2021-09-21 ENCOUNTER — OFFICE VISIT (OUTPATIENT)
Dept: SLEEP MEDICINE | Facility: CLINIC | Age: 51
End: 2021-09-21
Payer: COMMERCIAL

## 2021-09-21 VITALS
SYSTOLIC BLOOD PRESSURE: 145 MMHG | DIASTOLIC BLOOD PRESSURE: 96 MMHG | HEIGHT: 69 IN | BODY MASS INDEX: 29.47 KG/M2 | WEIGHT: 199 LBS | HEART RATE: 78 BPM | OXYGEN SATURATION: 97 %

## 2021-09-21 DIAGNOSIS — G47.33 OSA (OBSTRUCTIVE SLEEP APNEA): Primary | Chronic | ICD-10-CM

## 2021-09-21 DIAGNOSIS — R09.81 NASAL CONGESTION: ICD-10-CM

## 2021-09-21 PROCEDURE — 99214 OFFICE O/P EST MOD 30 MIN: CPT | Performed by: PHYSICIAN ASSISTANT

## 2021-09-21 ASSESSMENT — MIFFLIN-ST. JEOR: SCORE: 1743.04

## 2021-09-21 NOTE — PROGRESS NOTES
CPAP Follow-Up Visit:    Date on this visit: 9/21/2021    Eloise He has a follow-up visit today to review his CPAP use for HUEY. He was initially seen for unrefreshing sleep (ESS 12), loud snoring,  ancestry, comorbid cardiomyopathy.      Study Date: 9/4/2018 (198.0 lb)    - Apnea/hypopnea index was 21.6 events per hour (central apnea/hypopnea index was 0.6 events per hour). The REM AHI was 45.1 events per hour. The supine AHI was 38.1 events per hour. RDI was 33.7 events per hour.   - Lowest oxygen saturation was 69.5%. Time spent less than or equal to 88% was 14.5 minutes. Time spent less than or equal to 89% was 17.3 minutes.  -The PLM index was 17.4 movements per hour. The PLM Arousal Index was 1.5 per hour.       PAP machine: ResMed. Pressure settings: 5-18 cm    He has not used the machine since 12/2018. He feels the air is too cold and it causes congestion. He asked [facility] in [place] for help and feels he did not get much helpful response. He does not feel he has much trouble with his sleep. His wife observes loud snoring and pauses in breathing so encouraged him to come back in to get it addressed. He feels the mask is too small.     Interface:  Mask: Mirage FX mask  Chin strap: No  Leak: Yes/No  Using Humidifier: Yes  Condensation in hose or mask: Yes/No     Difficulties with therapy:    [-] Snoring with CPAP: slept alone when he was using it.   [?] Difficulty tolerating the pressure: can't remember  [-] Epistaxis/dry nose:   [+] Nasal congestion:  [-] Dry mouth: can't remember    Weight change since sleep study: 199 lbs up 9 pounds.    Bedtime: 11-11:30 PM.  Wake time: 7 AM. Falls asleep in 5 minutes. Wakes 0-1 times per night (a few times per week). Reason for waking: restroom. Sometimes wakes at 5 AM and has trouble getting back to sleep. He will put an eye mask on and that helps.   Naps: 0-2 times per week for 20 minutes over lunch.       Past medical/surgical history, family history, social  history, medications and allergies were reviewed.      Problem List:  Patient Active Problem List    Diagnosis Date Noted     Acute medial meniscus tear of right knee 11/09/2018     Priority: Medium     HUEY (obstructive sleep apnea)- moderate (AHI 21) 09/06/2018     Priority: Medium     9/4/2018 [place] [facility] Sleep Study (198.0 lbs) - AHI 21.6, RDI 33.7, Supine AHI 38.1, REM AHI 45.1, Low O2 69.5%, Time Spent ?88% 14.5 minutes / Time Spent ?89% 17.3 minutes.       Cardiomyopathy of undetermined type (H)      Priority: Medium     Idiopathic nonischemic cardiomyopathy. Stress echocardiography 2015 showed an EF of 40%-45% at baseline, which improved with stress to 55%-60%. Stress MRI/MRA 2015 with normal perfusion at rest and post-stress without evidence of inducible ischemia. Normal left ventricular size and mildly reduced systolic functionwith a calculated ejection fraction of  48 %.  Normal right ventricular size and systolic function with a calculated ejection fraction of 50%. On delayed enhancement imaging, there is no abnormal hyperenhancement to suggest myocardial scar/inflammation/infiltration. Collectively, these findings are consistent with an idiopathic nonischemic cardiomyopathy.       HDL deficiency 04/28/2015     Priority: Medium     Family history of early CAD      Priority: Medium     High myopia, both eyes 02/04/2014     Priority: Medium     Chronic fatigue 10/09/2012     Priority: Medium     Cough 10/09/2012     Priority: Medium     Mixed hyperlipidemia      Priority: Medium     Nasal congestion 12/21/2018     Priority: Low        Impression/Plan:    (G47.33) HUEY (obstructive sleep apnea)- moderate (AHI 21)  (primary encounter diagnosis)  Comment: Leobardo presents to re-establish care for HUEY. He has not used the CPAP in almost 3 years due to congestion. He feels the cold air provokes the congestion.   Plan: Comprehensive DME        Resume auto CPAP 5-18 cm. A prescription was written for new  supplies (full face mask). We reviewed recommendations for cleaning and replacing supplies. We reviewed how to adjust the humidity settings. Could consider dental appliance and positional restriction if still struggling with CPAP.       (R09.81) Nasal congestion  Comment: chronic, exacerbations with weather changes, CPAP  Plan: Try fluticasone nasal spray     He will follow up with me in about 2 month(s).     30 minutes were spent on the date of the encounter doing chart review, history and exam, documentation and further activities as noted above.     Bennett Goltz, PA-C    CC: No ref. provider found

## 2021-09-21 NOTE — NURSING NOTE
"Chief Complaint   Patient presents with     Sleep Problem     Trouble getting used to CPAP, wife noticing apnea       Initial BP (!) 142/97   Pulse 78   Ht 1.753 m (5' 9\")   Wt 90.3 kg (199 lb)   SpO2 97%   BMI 29.39 kg/m   Estimated body mass index is 29.39 kg/m  as calculated from the following:    Height as of this encounter: 1.753 m (5' 9\").    Weight as of this encounter: 90.3 kg (199 lb).    Medication Reconciliation: complete  ESS 11  Kristina Bautista CMA  "

## 2021-09-21 NOTE — PATIENT INSTRUCTIONS
Your BMI is Body mass index is 29.39 kg/m .  Weight management is a personal decision.  If you are interested in exploring weight loss strategies, the following discussion covers the approaches that may be successful. Body mass index (BMI) is one way to tell whether you are at a healthy weight, overweight, or obese. It measures your weight in relation to your height.  A BMI of 18.5 to 24.9 is in the healthy range. A person with a BMI of 25 to 29.9 is considered overweight, and someone with a BMI of 30 or greater is considered obese. More than two-thirds of American adults are considered overweight or obese.  Being overweight or obese increases the risk for further weight gain. Excess weight may lead to heart disease and diabetes.  Creating and following plans for healthy eating and physical activity may help you improve your health.  Weight control is part of healthy lifestyle and includes exercise, emotional health, and healthy eating habits. Careful eating habits lifelong are the mainstay of weight control. Though there are significant health benefits from weight loss, long-term weight loss with diet alone may be very difficult to achieve- studies show long-term success with dietary management in less than 10% of people. Attaining a healthy weight may be especially difficult to achieve in those with severe obesity. In some cases, medications, devices and surgical management might be considered.  What can you do?  If you are overweight or obese and are interested in methods for weight loss, you should discuss this with your provider.     Consider reducing daily calorie intake by 500 calories.     Keep a food journal.     Avoiding skipping meals, consider cutting portions instead.    Diet combined with exercise helps maintain muscle while optimizing fat loss. Strength training is particularly important for building and maintaining muscle mass. Exercise helps reduce stress, increase energy, and improves fitness.  Increasing exercise without diet control, however, may not burn enough calories to loose weight.       Start walking three days a week 10-20 minutes at a time    Work towards walking thirty minutes five days a week     Eventually, increase the speed of your walking for 1-2 minutes at time    In addition, we recommend that you review healthy lifestyles and methods for weight loss available through the National Institutes of Health patient information sites:  http://win.niddk.nih.gov/publications/index.htm    And look into health and wellness programs that may be available through your health insurance provider, employer, local community center, or christian club.    Weight management plan: Patient was referred to their PCP to discuss a diet and exercise plan.

## 2021-09-28 ENCOUNTER — PRE VISIT (OUTPATIENT)
Dept: CARDIOLOGY | Facility: CLINIC | Age: 51
End: 2021-09-28

## 2021-10-01 ENCOUNTER — LAB (OUTPATIENT)
Dept: LAB | Facility: CLINIC | Age: 51
End: 2021-10-01
Payer: COMMERCIAL

## 2021-10-01 DIAGNOSIS — I42.9 CARDIOMYOPATHY OF UNDETERMINED TYPE (H): Chronic | ICD-10-CM

## 2021-10-01 DIAGNOSIS — E78.2 MIXED HYPERLIPIDEMIA: Chronic | ICD-10-CM

## 2021-10-01 DIAGNOSIS — E78.6 HDL DEFICIENCY: Chronic | ICD-10-CM

## 2021-10-01 DIAGNOSIS — Z82.49 FAMILY HISTORY OF EARLY CAD: Chronic | ICD-10-CM

## 2021-10-01 LAB
ANION GAP SERPL CALCULATED.3IONS-SCNC: 4 MMOL/L (ref 3–14)
BUN SERPL-MCNC: 18 MG/DL (ref 7–30)
CALCIUM SERPL-MCNC: 9 MG/DL (ref 8.5–10.1)
CHLORIDE BLD-SCNC: 108 MMOL/L (ref 94–109)
CHOLEST SERPL-MCNC: 181 MG/DL
CO2 SERPL-SCNC: 27 MMOL/L (ref 20–32)
CREAT SERPL-MCNC: 0.81 MG/DL (ref 0.66–1.25)
FASTING STATUS PATIENT QL REPORTED: YES
GFR SERPL CREATININE-BSD FRML MDRD: >90 ML/MIN/1.73M2
GLUCOSE BLD-MCNC: 103 MG/DL (ref 70–99)
HDLC SERPL-MCNC: 29 MG/DL
LDLC SERPL CALC-MCNC: 76 MG/DL
NONHDLC SERPL-MCNC: 152 MG/DL
POTASSIUM BLD-SCNC: 3.9 MMOL/L (ref 3.4–5.3)
SODIUM SERPL-SCNC: 139 MMOL/L (ref 133–144)
TRIGL SERPL-MCNC: 379 MG/DL

## 2021-10-01 PROCEDURE — 36415 COLL VENOUS BLD VENIPUNCTURE: CPT

## 2021-10-01 PROCEDURE — 80048 BASIC METABOLIC PNL TOTAL CA: CPT

## 2021-10-01 PROCEDURE — 80061 LIPID PANEL: CPT

## 2021-10-02 ENCOUNTER — HEALTH MAINTENANCE LETTER (OUTPATIENT)
Age: 51
End: 2021-10-02

## 2021-11-04 ENCOUNTER — OFFICE VISIT (OUTPATIENT)
Dept: CARDIOLOGY | Facility: CLINIC | Age: 51
End: 2021-11-04
Payer: COMMERCIAL

## 2021-11-04 VITALS
BODY MASS INDEX: 29.03 KG/M2 | HEIGHT: 69 IN | HEART RATE: 76 BPM | WEIGHT: 196 LBS | SYSTOLIC BLOOD PRESSURE: 138 MMHG | DIASTOLIC BLOOD PRESSURE: 84 MMHG

## 2021-11-04 DIAGNOSIS — E78.2 MIXED HYPERLIPIDEMIA: Primary | Chronic | ICD-10-CM

## 2021-11-04 DIAGNOSIS — E78.6 HDL DEFICIENCY: Chronic | ICD-10-CM

## 2021-11-04 DIAGNOSIS — I42.9 CARDIOMYOPATHY OF UNDETERMINED TYPE (H): Chronic | ICD-10-CM

## 2021-11-04 DIAGNOSIS — R53.82 CHRONIC FATIGUE: ICD-10-CM

## 2021-11-04 DIAGNOSIS — Z82.49 FAMILY HISTORY OF EARLY CAD: Chronic | ICD-10-CM

## 2021-11-04 PROCEDURE — 99214 OFFICE O/P EST MOD 30 MIN: CPT | Performed by: INTERNAL MEDICINE

## 2021-11-04 RX ORDER — ROSUVASTATIN CALCIUM 10 MG/1
10 TABLET, COATED ORAL DAILY
Qty: 90 TABLET | Refills: 3 | Status: SHIPPED | OUTPATIENT
Start: 2021-11-04 | End: 2022-11-02

## 2021-11-04 RX ORDER — ATORVASTATIN CALCIUM 10 MG/1
10 TABLET, FILM COATED ORAL DAILY
Qty: 90 TABLET | Refills: 2 | Status: CANCELLED | OUTPATIENT
Start: 2021-11-04

## 2021-11-04 ASSESSMENT — MIFFLIN-ST. JEOR: SCORE: 1729.43

## 2021-11-04 NOTE — PROGRESS NOTES
HPI and Plan:   See dictation    Orders Placed This Encounter   Procedures     TSH with free T4 reflex     Lipid Profile     ALT     Lipid Profile     Follow-Up with Cardiologist       Orders Placed This Encounter   Medications     rosuvastatin (CRESTOR) 10 MG tablet     Sig: Take 1 tablet (10 mg) by mouth daily     Dispense:  90 tablet     Refill:  3       Medications Discontinued During This Encounter   Medication Reason     Aspirin 81 MG CAPS Medication Reconciliation Clean Up     atorvastatin (LIPITOR) 10 MG tablet      aspirin EC 81 MG tablet          Encounter Diagnoses   Name Primary?     Mixed hyperlipidemia Yes     HDL deficiency      Cardiomyopathy of undetermined type (H)      Family history of early CAD      Chronic fatigue        CURRENT MEDICATIONS:  Current Outpatient Medications   Medication Sig Dispense Refill     cholecalciferol (VITAMIN D3) 25 mcg (1000 units) capsule Take 1 capsule by mouth daily       Multiple Vitamin (MULTI-VITAMIN) per tablet Take 1 tablet by mouth daily.       omega-3 fatty acids 1200 MG capsule Take 2 capsules by mouth daily.       rosuvastatin (CRESTOR) 10 MG tablet Take 1 tablet (10 mg) by mouth daily 90 tablet 3     Grape Seed 100 MG CAPS  (Patient not taking: Reported on 11/4/2021)       pimecrolimus (ELIDEL) 1 % external cream Apply twice daily to affected areas until clear (Patient not taking: Reported on 11/4/2021) 100 g 3       ALLERGIES   No Known Allergies    PAST MEDICAL HISTORY:  Past Medical History:   Diagnosis Date     Anxiety 05/01/2017    resolved 2018     Psychophysiological insomnia 05/01/2017    resolved 2018       PAST SURGICAL HISTORY:  Past Surgical History:   Procedure Laterality Date     COLONOSCOPY WITH CO2 INSUFFLATION N/A 10/24/2014    Procedure: COLONOSCOPY WITH CO2 INSUFFLATION;  Surgeon: Duane, William Charles, MD;  Location: [facility] OR     HC TOOTH EXTRACTION W/FORCEP         FAMILY HISTORY:  Family History   Problem Relation Age of Onset      SULLY 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, teachers/students   Tobacco Use     Smoking status: Former Smoker     Packs/day: 1.00     Years: 2.00     Pack years: 2.00     Types: Cigarettes     Smokeless tobacco: Never Used   Substance and Sexual Activity     Alcohol use: Yes     Alcohol/week: 0.8 - 1.7 standard drinks     Types: 1 - 2 Standard drinks or equivalent per week     Comment: socially     Drug use: No     Sexual activity: Not Currently     Comment: not currently    Other Topics Concern      Service Not Asked     Blood Transfusions Not Asked     Caffeine Concern Yes     Comment: tea 1-2 per day     Occupational Exposure Not Asked     Hobby Hazards Not Asked     Sleep Concern No     Stress Concern No     Weight Concern No     Special Diet No     Back Care Not Asked     Exercise No     Bike Helmet Not Asked     Seat Belt Yes     Self-Exams Not Asked     Parent/sibling w/ CABG, MI or angioplasty before 65F 55M? Not Asked   Social History Narrative     None     Social Determinants of Health     Financial Resource Strain:      Difficulty of Paying Living Expenses:    Food Insecurity:      Worried About Running Out of Food in the Last Year:      Ran Out of Food in the Last Year:    Transportation Needs:      Lack of Transportation (Medical):      Lack of Transportation (Non-Medical):    Physical Activity:      Days of Exercise per Week:      Minutes of Exercise per Session:    Stress:      Feeling  "of Stress :    Social Connections:      Frequency of Communication with Friends and Family:      Frequency of Social Gatherings with Friends and Family:      Attends Rastafarian Services:      Active Member of Clubs or Organizations:      Attends Club or Organization Meetings:      Marital Status:    Intimate Partner Violence:      Fear of Current or Ex-Partner:      Emotionally Abused:      Physically Abused:      Sexually Abused:        Review of Systems:  Skin:  Negative       Eyes:  Positive for glasses    ENT:  Negative      Respiratory:  Positive for dyspnea on exertion;sleep apnea     Cardiovascular:    Positive for;fatigue    Gastroenterology: Negative      Genitourinary:  Negative      Musculoskeletal:  Negative      Neurologic:  Negative      Psychiatric:  Negative      Heme/Lymph/Imm:  Negative      Endocrine:  Negative        Physical Exam:  Vitals: /84 (BP Location: Left arm, Cuff Size: Adult Regular)   Pulse 76   Ht 1.753 m (5' 9\")   Wt 88.9 kg (196 lb)   BMI 28.94 kg/m      Constitutional:  cooperative, alert and oriented, well developed, well nourished, in no acute distress        Skin:  warm and dry to the touch, no apparent skin lesions or masses noted          Head:  normocephalic, no masses or lesions        Eyes:  pupils equal and round, conjunctivae and lids unremarkable, sclera white, no xanthalasma, EOMS intact, no nystagmus        Lymph:      ENT:  no pallor or cyanosis, dentition good        Neck:  carotid pulses are full and equal bilaterally;no carotid bruit        Respiratory:  normal breath sounds, clear to auscultation, normal A-P diameter, normal symmetry, normal respiratory excursion, no use of accessory muscles         Cardiac: regular rhythm;normal S1 and S2;no S3 or S4;no murmurs, gallops or rubs detected                pulses full and equal                                        GI:           Extremities and Muscular Skeletal:  no edema;no spinal abnormalities " noted;normal muscle strength and tone              Neurological:  no gross motor deficits        Psych:  affect appropriate, oriented to time, person and place          CC  Marshal Snyder MD  [id number]  [place] [id number]

## 2021-11-04 NOTE — PROGRESS NOTES
Service Date: 11/04/2021    CLINIC VISIT    HISTORY OF PRESENT ILLNESS:  Mr. Johnson is a very nice 51-year-old gentleman with past medical history significant for his father dying of myocardial infarction at age 42.  The patient has a severe HDL deficiency and hypertriglyceridemia.  Stress test in 2009 demonstrated no evidence of ischemia.  Leobardo has always been quite reluctant to go on any medications.  Ultimately, I convinced him to start a baby-dose aspirin and 10 mg of atorvastatin.  He has also been unwilling to do any sort of stress test involving radiation including a calcium scoring test in 2015.  I convinced him to undergo an MRI given his cardiomyopathy.  MRI demonstrated normal perfusion.  Ejection fraction was estimated to be 48%.  A stress echo from 2015 also demonstrated ejection fraction of 40%-45%, consistent with a cardiomyopathy of unclear etiology.    Unfortunately, with COVID, Eloise has not gone to the club anymore, stopped exercising and gained some weight.  He states he works from home now which results in even less activity because he sits at his computer for 8 hours a day.  Unfortunately, as stated, he has gained some weight.  He is having no chest, arm, neck, jaw or shoulder discomfort.  He has no dyspnea on exertion, orthopnea or PND.  No palpitations, lightheadedness, dizziness, syncope or near syncope.  He does have chronic fatigue and wonders whether it is his heart, although it does not appear to have any exertional component.    ASSESSMENT AND PLAN:  Eloise appears to be doing well from a cardiac standpoint without clinical evidence of ischemia, heart failure or significant arrhythmia.    Blood pressure is well controlled today at 138/84 with a pulse of 76.    Weight unfortunately is 196 pounds, giving a body mass index of 28.9.    He has had a major back slide in his cholesterol over the last 2 years, total cholesterol going from 131 to 81, HDL going from 31 to 29, LDL from 50 to 76 and  triglycerides from 249 to 395.    Eloise clearly has metabolic syndrome with glucose intolerance with sugars running 103, hypertension, hypertriglyceridemia with HDL deficiency, I have emphasized the importance of he has to get back to the club, start exercising again, eating a low-carbohydrate diet and losing the weight he has gained because of the major backslide in his cholesterol profile over the last 2 years.    I have also recommended that we switch from atorvastatin to rosuvastatin, as it is better for patients with low HDL.  I will recheck a fasting lipid profile and liver function tests in 1 month.    We discussed a variety of issues including vaccinations, COVID and other parameters for cardiovascular fitness.  I will have Leobardo come back in 1 year.  If he should have any problems, I would be glad to see him sooner.    Over 30 minutes was spent discussing his lab work and his various issues.    Marshal Snyder MD, [facility]        D: 2021   T: 2021   MT: leena    Name:     ELOISE DONIS  MRN:      [id number]        Account:      [id number]   :      1970           Service Date: 2021       Document: [id number]

## 2021-11-04 NOTE — LETTER
11/4/2021    Fabian Crowder MD  No address on file    RE: Eloise Alex       Dear Colleague,    I had the pleasure of seeing Eloise Johnson in the [facility].    HPI and Plan:   See dictation    Orders Placed This Encounter   Procedures     TSH with free T4 reflex     Lipid Profile     ALT     Lipid Profile     Follow-Up with Cardiologist       Orders Placed This Encounter   Medications     rosuvastatin (CRESTOR) 10 MG tablet     Sig: Take 1 tablet (10 mg) by mouth daily     Dispense:  90 tablet     Refill:  3       Medications Discontinued During This Encounter   Medication Reason     Aspirin 81 MG CAPS Medication Reconciliation Clean Up     atorvastatin (LIPITOR) 10 MG tablet      aspirin EC 81 MG tablet          Encounter Diagnoses   Name Primary?     Mixed hyperlipidemia Yes     HDL deficiency      Cardiomyopathy of undetermined type (H)      Family history of early CAD      Chronic fatigue        CURRENT MEDICATIONS:  Current Outpatient Medications   Medication Sig Dispense Refill     cholecalciferol (VITAMIN D3) 25 mcg (1000 units) capsule Take 1 capsule by mouth daily       Multiple Vitamin (MULTI-VITAMIN) per tablet Take 1 tablet by mouth daily.       omega-3 fatty acids 1200 MG capsule Take 2 capsules by mouth daily.       rosuvastatin (CRESTOR) 10 MG tablet Take 1 tablet (10 mg) by mouth daily 90 tablet 3     Grape Seed 100 MG CAPS  (Patient not taking: Reported on 11/4/2021)       pimecrolimus (ELIDEL) 1 % external cream Apply twice daily to affected areas until clear (Patient not taking: Reported on 11/4/2021) 100 g 3       ALLERGIES   No Known Allergies    PAST MEDICAL HISTORY:  Past Medical History:   Diagnosis Date     Anxiety 05/01/2017    resolved 2018     Psychophysiological insomnia 05/01/2017    resolved 2018       PAST SURGICAL HISTORY:  Past Surgical History:   Procedure Laterality Date     COLONOSCOPY WITH CO2 INSUFFLATION N/A 10/24/2014     Procedure: COLONOSCOPY WITH CO2 INSUFFLATION;  Surgeon: Duane, William Charles, MD;  Location: [facility] OR     HC TOOTH EXTRACTION W/FORCEP         FAMILY HISTORY:  Family History   Problem Relation Age of Onset     C.A.D. 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, teachers/students   Tobacco Use     Smoking status: Former Smoker     Packs/day: 1.00     Years: 2.00     Pack years: 2.00     Types: Cigarettes     Smokeless tobacco: Never Used   Substance and Sexual Activity     Alcohol use: Yes     Alcohol/week: 0.8 - 1.7 standard drinks     Types: 1 - 2 Standard drinks or equivalent per week     Comment: socially     Drug use: No     Sexual activity: Not Currently     Comment: not currently    Other Topics Concern      Service Not Asked     Blood Transfusions Not Asked     Caffeine Concern Yes     Comment: tea 1-2 per day     Occupational Exposure Not Asked     Hobby Hazards Not Asked     Sleep Concern No     Stress Concern No     Weight Concern No     Special Diet No     Back Care Not Asked     Exercise No     Bike Helmet Not Asked     Seat Belt Yes     Self-Exams Not Asked     Parent/sibling w/ CABG, MI or angioplasty before 65F 55M? Not Asked   Social History Narrative     None     Social Determinants of Health     Financial Resource Strain:      Difficulty of Paying Living Expenses:    Food Insecurity:      Worried About Running Out of Food in the Last Year:      Ran Out of Food in the Last Year:   "  Transportation Needs:      Lack of Transportation (Medical):      Lack of Transportation (Non-Medical):    Physical Activity:      Days of Exercise per Week:      Minutes of Exercise per Session:    Stress:      Feeling of Stress :    Social Connections:      Frequency of Communication with Friends and Family:      Frequency of Social Gatherings with Friends and Family:      Attends [employer] Services:      Active Member of Clubs or Organizations:      Attends Club or Organization Meetings:      Marital Status:    Intimate Partner Violence:      Fear of Current or Ex-Partner:      Emotionally Abused:      Physically Abused:      Sexually Abused:        Review of Systems:  Skin:  Negative       Eyes:  Positive for glasses    ENT:  Negative      Respiratory:  Positive for dyspnea on exertion;sleep apnea     Cardiovascular:    Positive for;fatigue    Gastroenterology: Negative      Genitourinary:  Negative      Musculoskeletal:  Negative      Neurologic:  Negative      Psychiatric:  Negative      Heme/Lymph/Imm:  Negative      Endocrine:  Negative        Physical Exam:  Vitals: /84 (BP Location: Left arm, Cuff Size: Adult Regular)   Pulse 76   Ht 1.753 m (5' 9\")   Wt 88.9 kg (196 lb)   BMI 28.94 kg/m      Constitutional:  cooperative, alert and oriented, well developed, well nourished, in no acute distress        Skin:  warm and dry to the touch, no apparent skin lesions or masses noted          Head:  normocephalic, no masses or lesions        Eyes:  pupils equal and round, conjunctivae and lids unremarkable, sclera white, no xanthalasma, EOMS intact, no nystagmus        Lymph:      ENT:  no pallor or cyanosis, dentition good        Neck:  carotid pulses are full and equal bilaterally;no carotid bruit        Respiratory:  normal breath sounds, clear to auscultation, normal A-P diameter, normal symmetry, normal respiratory excursion, no use of accessory muscles         Cardiac: regular rhythm;normal S1 and " S2;no S3 or S4;no murmurs, gallops or rubs detected                pulses full and equal                                        GI:           Extremities and Muscular Skeletal:  no edema;no spinal abnormalities noted;normal muscle strength and tone              Neurological:  no gross motor deficits        Psych:  affect appropriate, oriented to time, person and place          CC  Marshal Snyder MD  [address]  [place] [id number]                      Thank you for allowing me to participate in the care of your patient.      Sincerely,     Marshal Snyder MD     [facility]  cc:   Marshal Snyder MD  [address]  [place] [id number]

## 2021-12-09 ENCOUNTER — TELEPHONE (OUTPATIENT)
Dept: OPTOMETRY | Facility: CLINIC | Age: 51
End: 2021-12-09
Payer: COMMERCIAL

## 2021-12-09 NOTE — TELEPHONE ENCOUNTER
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 Please advise. Thank you     Action Taken: Message routed to:  Adult Clinics: Eye p [id number]    Travel Screening: Not Applicable

## 2021-12-25 ENCOUNTER — E-VISIT (OUTPATIENT)
Dept: URGENT CARE | Facility: CLINIC | Age: 51
End: 2021-12-25
Payer: COMMERCIAL

## 2021-12-25 DIAGNOSIS — Z20.822 SUSPECTED COVID-19 VIRUS INFECTION: Primary | ICD-10-CM

## 2021-12-25 PROCEDURE — 99421 OL DIG E/M SVC 5-10 MIN: CPT | Performed by: PHYSICIAN ASSISTANT

## 2021-12-25 NOTE — PATIENT INSTRUCTIONS
Dear Eloise Johnson,    Your symptoms show that you may have coronavirus (COVID-19). This illness can cause fever, cough and trouble breathing. Many people get a mild case and get better on their own. Some people can get very sick.    Because you also reported cough and diarrhea I would like to also test you for influenza to determine if we need to treat you for that as well.    What should I do?  We would like to test you for Covid-19 virus and influenza. I have placed orders for these tests.     For all employees or close contacts (except [place] and [employer] - see below), go to your [employer] home page and scroll down to the section that says  You have an appointment that needs to be scheduled  and click the large green button that says  Schedule Now  and follow the steps to find the next available opening.  It is important that when you are asked what the reason for your appointment is that you mention you need BOTH Covid and influenza tests.  tests.     If you are unable to complete these steps or if you cannot find any available times, please call [phone] to schedule employee testing.     [place] employees or close contacts, please call [phone].   [place] ([employer]) employees or close contacts call [phone].    Return to work/school/ guidance:  Please let your workplace manager and staffing office know when your quarantine ends     We can t give you an exact date as it depends on the above. You can calculate this on your own or work with your manager/staffing office to calculate this. (For example if you were exposed on 10/4, you would have to quarantine for 14 full days. That would be through 10/18. You could return on 10/19.)      If you receive a positive COVID-19 test result, follow the guidance of the those who are giving you the results. Usually the return to work is 10 (or in some cases 20 days from symptom onset.) If you work at [employer], you must also be cleared by Employee  Occupational Health and Safety to return to work.        If you receive a negative COVID-19 test result and did not have a high risk exposure to someone with a known positive COVID-19 test, you can return to work once you're free of fever for 24 hours without fever-reducing medication and your symptoms are improving or resolved.      If you receive a negative COVID-19 test and If you had a high risk exposure to someone who has tested positive for COVID-19 then you can return to work 14 days after your last contact with the positive individual    Note: If you have ongoing exposure to the covid positive person, this quarantine period may be more than 14 days. (For example, if you are continued to be exposed to your child who tested positive and cannot isolate from them, then the quarantine of 7-14 days can't start until your child is no longer contagious. This is typically 10 days from onset of the child's symptoms. So the total duration may be 17-24 days in this case.)    Sign up for [employer].   We know it's scary to hear that you might have COVID-19. We want to track your symptoms to make sure you're okay over the next 2 weeks. Please look for an email from [employer]--this is a free, online program that we'll use to keep in touch. To sign up, follow the link in the email you will receive. Learn more at [email or web address]    How can I take care of myself?    Get lots of rest. Drink extra fluids (unless a doctor has told you not to)    Take Tylenol (acetaminophen) or ibuprofen for fever or pain. If you have liver or kidney problems, ask your family doctor if it's okay to take Tylenol o ibuprofen    If you have other health problems (like cancer, heart failure, an organ transplant or severe kidney disease): Call your specialty clinic if you don't feel better in the next 2 days.    Know when to call 911. Emergency warning signs include:  o Trouble breathing or shortness of breath  o Pain or  pressure in the chest that doesn't go away  o Feeling confused like you haven't felt before, or not being able to wake up  o Bluish-colored lips or face    Where can I get more information?  [facility] - About COVID-19:   [email or web address]    CDC - What to Do If You're Sick:   www.cdc.gov/coronavirus/2019-ncov/about/steps-when-sick.html

## 2021-12-27 ENCOUNTER — LAB (OUTPATIENT)
Dept: URGENT CARE | Facility: URGENT CARE | Age: 51
End: 2021-12-27
Attending: PHYSICIAN ASSISTANT
Payer: COMMERCIAL

## 2021-12-27 DIAGNOSIS — Z20.822 SUSPECTED COVID-19 VIRUS INFECTION: ICD-10-CM

## 2021-12-27 LAB
FLUAV AG SPEC QL IA: NEGATIVE
FLUBV AG SPEC QL IA: NEGATIVE

## 2021-12-27 PROCEDURE — U0005 INFEC AGEN DETEC AMPLI PROBE: HCPCS

## 2021-12-27 PROCEDURE — 87804 INFLUENZA ASSAY W/OPTIC: CPT

## 2021-12-27 PROCEDURE — U0003 INFECTIOUS AGENT DETECTION BY NUCLEIC ACID (DNA OR RNA); SEVERE ACUTE RESPIRATORY SYNDROME CORONAVIRUS 2 (SARS-COV-2) (CORONAVIRUS DISEASE [COVID-19]), AMPLIFIED PROBE TECHNIQUE, MAKING USE OF HIGH THROUGHPUT TECHNOLOGIES AS DESCRIBED BY CMS-2020-01-R: HCPCS

## 2021-12-28 LAB — SARS-COV-2 RNA RESP QL NAA+PROBE: NEGATIVE

## 2022-02-24 NOTE — TELEPHONE ENCOUNTER
DIAGNOSIS: LFT knee pain   APPOINTMENT DATE: 03/03/2022   NOTES STATUS DETAILS   OFFICE NOTE from referring provider N/A    OFFICE NOTE from other specialist N/A    DISCHARGE SUMMARY from hospital N/A    DISCHARGE REPORT from the ER N/A    OPERATIVE REPORT N/A    MEDICATION LIST N/A    EMG (for Spine) N/A    IMPLANT RECORD/STICKER N/A    LABS     CBC/DIFF N/A    CULTURES N/A    INJECTIONS DONE IN RADIOLOGY N/A    MRI N/A    CT SCAN N/A    XRAYS (IMAGES & REPORTS) N/A    TUMOR     PATHOLOGY  Slides & report N/A

## 2022-03-02 DIAGNOSIS — M25.562 LEFT KNEE PAIN, UNSPECIFIED CHRONICITY: Primary | ICD-10-CM

## 2022-03-03 ENCOUNTER — OFFICE VISIT (OUTPATIENT)
Dept: ORTHOPEDICS | Facility: CLINIC | Age: 52
End: 2022-03-03
Payer: COMMERCIAL

## 2022-03-03 ENCOUNTER — ANCILLARY PROCEDURE (OUTPATIENT)
Dept: GENERAL RADIOLOGY | Facility: CLINIC | Age: 52
End: 2022-03-03
Attending: ORTHOPAEDIC SURGERY
Payer: COMMERCIAL

## 2022-03-03 ENCOUNTER — PRE VISIT (OUTPATIENT)
Dept: ORTHOPEDICS | Facility: CLINIC | Age: 52
End: 2022-03-03

## 2022-03-03 VITALS — HEIGHT: 69 IN | BODY MASS INDEX: 28.6 KG/M2 | WEIGHT: 193.1 LBS

## 2022-03-03 DIAGNOSIS — G89.29 CHRONIC PAIN OF LEFT KNEE: Primary | ICD-10-CM

## 2022-03-03 DIAGNOSIS — M25.562 LEFT KNEE PAIN, UNSPECIFIED CHRONICITY: ICD-10-CM

## 2022-03-03 DIAGNOSIS — M25.562 CHRONIC PAIN OF LEFT KNEE: Primary | ICD-10-CM

## 2022-03-03 PROCEDURE — 73562 X-RAY EXAM OF KNEE 3: CPT | Mod: LT | Performed by: RADIOLOGY

## 2022-03-03 PROCEDURE — 99203 OFFICE O/P NEW LOW 30 MIN: CPT | Performed by: ORTHOPAEDIC SURGERY

## 2022-03-03 RX ORDER — DICLOFENAC SODIUM 75 MG/1
75 TABLET, DELAYED RELEASE ORAL 2 TIMES DAILY
Qty: 60 TABLET | Refills: 0 | Status: SHIPPED | OUTPATIENT
Start: 2022-03-03 | End: 2022-04-05

## 2022-03-03 NOTE — LETTER
3/3/2022         RE: Eloise Johnson  [address] [facility] [address]        Dear Colleague,    Thank you for referring your patient, Eloise Johnson, to the [facility]. Please see a copy of my visit note below.    CHIEF CONCERN: Left knee pain    HISTORY:   Leobardo is a very pleasant 51-year-old male.  I saw him 3 or 4 years ago and at that time I diagnosed him with a medial meniscus tear to his right knee.  It was minimally symptomatic he chose to do physical therapy and a static complete improvement of all his symptoms.  He gives a SANE score of 100 on the right and states that he is absolutely no problems.  He comes in today complaining of left-sided knee pain as well as swelling.  It does bother him.  It does keep him from doing what he wants to do.  He denies any prior injuries.  He has not had an injection.  He feels like it was gradual in onset and has been there since January.  He gives a SANE score of 90.  He would like to do therapy for this side.    PAST MEDICAL HISTORY: (Reviewed with the patient and in the [facility] medical record)  1. None    PAST SURGICAL HISTORY: (Reviewed with the patient and in the [facility] medical record)  1. None    MEDICATIONS: (Reviewed with the patient and in the [facility] medical record)    Notable medications include: No blood thinners or opioids    ALLERGIES: (Reviewed with the patient and in the [facility] medical record)  1. None      SOCIAL HISTORY: (Reviewed with the patient and in the medical record)  --Tobacco: Non-smoker  --Avocation/Sport: Enjoys being active.  Presents with his daughter today    FAMILY HISTORY: (Reviewed with the patient and in the medical record)  -- No family history of bleeding, clotting, or difficulty with anesthesia    REVIEW OF SYSTEMS: (Reviewed with the patient and on the health intake form)  -- A comprehensive 10 point review of systems was conducted and is negative except as noted in the HPI    EXAM:     General: Awake, Alert and  Oriented, No acute Distress. Articulate and Interactive    Body mass index is 28.52 kg/m .    Left lower extremity :    Skin is Warm and Well perfused, no suggestion of infection    1+ effusion    Stable to varus and valgus stress testing.  Stable anterior and posterior drawer testing.  No pivot shift    Lachman 0    2 quadrant lateral translation, 1 quadrant medial.  Tilt to neutral.    EHL/FHL/TA/GS 5/5    Sensation intact L3-S1    2+ Dorsalis Pedis Pulse    IMAGING:    Plain Radiographs: No fractures dislocations.  Trace arthritis is noted the medial compartment of the right knee overall the left knee is well preserved on my review    MRI: None    ASSESSMENT:  1. Left knee pain and swelling.  Presumed early arthritis    PLAN:  1. I am going to initiate diclofenac 75 mg p.o. twice daily on a scheduled basis for 2 weeks and then transition to as needed  2. He will initiate physical therapy  3. If he is not seeing good improvement he will reach out and call my office we will plan to get an MRI of his knee and I will plan to see him back afterwards to discuss the results          Again, thank you for allowing me to participate in the care of your patient.        Sincerely,        Conrad Davila MD

## 2022-03-03 NOTE — PROGRESS NOTES
CHIEF CONCERN: Left knee pain    HISTORY:   Leobardo is a very pleasant 51-year-old male.  I saw him 3 or 4 years ago and at that time I diagnosed him with a medial meniscus tear to his right knee.  It was minimally symptomatic he chose to do physical therapy and a static complete improvement of all his symptoms.  He gives a SANE score of 100 on the right and states that he is absolutely no problems.  He comes in today complaining of left-sided knee pain as well as swelling.  It does bother him.  It does keep him from doing what he wants to do.  He denies any prior injuries.  He has not had an injection.  He feels like it was gradual in onset and has been there since January.  He gives a SANE score of 90.  He would like to do therapy for this side.    PAST MEDICAL HISTORY: (Reviewed with the patient and in the [facility] medical record)  1. None    PAST SURGICAL HISTORY: (Reviewed with the patient and in the [facility] medical record)  1. None    MEDICATIONS: (Reviewed with the patient and in the [facility] medical record)    Notable medications include: No blood thinners or opioids    ALLERGIES: (Reviewed with the patient and in the [facility] medical record)  1. None      SOCIAL HISTORY: (Reviewed with the patient and in the medical record)  --Tobacco: Non-smoker  --Avocation/Sport: Enjoys being active.  Presents with his daughter today    FAMILY HISTORY: (Reviewed with the patient and in the medical record)  -- No family history of bleeding, clotting, or difficulty with anesthesia    REVIEW OF SYSTEMS: (Reviewed with the patient and on the health intake form)  -- A comprehensive 10 point review of systems was conducted and is negative except as noted in the HPI    EXAM:     General: Awake, Alert and Oriented, No acute Distress. Articulate and Interactive    Body mass index is 28.52 kg/m .    Left lower extremity :    Skin is Warm and Well perfused, no suggestion of infection    1+ effusion    Stable to varus and valgus stress testing.   Stable anterior and posterior drawer testing.  No pivot shift    Lachman 0    2 quadrant lateral translation, 1 quadrant medial.  Tilt to neutral.    EHL/FHL/TA/GS 5/5    Sensation intact L3-S1    2+ Dorsalis Pedis Pulse    IMAGING:    Plain Radiographs: No fractures dislocations.  Trace arthritis is noted the medial compartment of the right knee overall the left knee is well preserved on my review    MRI: None    ASSESSMENT:  1. Left knee pain and swelling.  Presumed early arthritis    PLAN:  1. I am going to initiate diclofenac 75 mg p.o. twice daily on a scheduled basis for 2 weeks and then transition to as needed  2. He will initiate physical therapy  3. If he is not seeing good improvement he will reach out and call my office we will plan to get an MRI of his knee and I will plan to see him back afterwards to discuss the results

## 2022-03-03 NOTE — NURSING NOTE
Reason For Visit:   Chief Complaint   Patient presents with     Consult     Left knee pain - feels swollen and uncomfortable; heat helps        ?  No  Occupation Work from home.  Currently working? Yes.  Work status?  Full time.  Date of injury: Gradual onset - pain has been there since January  Date of surgery: No history of shoulder surgeries; no recent shoulder cortisone injections  Smoker: No  Request smoking cessation information: No    Sane Score  Left knee - Affected  Left Knee- 90  Right Knee- 100      Marie Salcido, ATC

## 2022-03-07 ENCOUNTER — THERAPY VISIT (OUTPATIENT)
Dept: PHYSICAL THERAPY | Facility: CLINIC | Age: 52
End: 2022-03-07
Payer: COMMERCIAL

## 2022-03-07 DIAGNOSIS — M25.562 CHRONIC PAIN OF LEFT KNEE: ICD-10-CM

## 2022-03-07 DIAGNOSIS — G89.29 CHRONIC PAIN OF LEFT KNEE: ICD-10-CM

## 2022-03-07 PROCEDURE — 97110 THERAPEUTIC EXERCISES: CPT | Mod: GP

## 2022-03-07 PROCEDURE — 97161 PT EVAL LOW COMPLEX 20 MIN: CPT | Mod: GP

## 2022-03-07 ASSESSMENT — ACTIVITIES OF DAILY LIVING (ADL)
PAIN: THE SYMPTOM AFFECTS MY ACTIVITY SLIGHTLY
RISE FROM A CHAIR: NOT ANSWERED
WALK: NOT ANSWERED
SIT WITH YOUR KNEE BENT: NOT ANSWERED
STIFFNESS: NOT ANSWERED
GIVING WAY, BUCKLING OR SHIFTING OF KNEE: I DO NOT HAVE THE SYMPTOM
STAND: NOT ANSWERED
KNEE_ACTIVITY_OF_DAILY_LIVING_SUM: 15
GO DOWN STAIRS: NOT ANSWERED
GO UP STAIRS: NOT ANSWERED
SQUAT: NOT ANSWERED
LIMPING: THE SYMPTOM AFFECTS MY ACTIVITY SLIGHTLY
KNEEL ON THE FRONT OF YOUR KNEE: NOT ANSWERED
WEAKNESS: THE SYMPTOM AFFECTS MY ACTIVITY MODERATELY
SWELLING: THE SYMPTOM AFFECTS MY ACTIVITY SLIGHTLY

## 2022-03-07 NOTE — PROGRESS NOTES
"Physical Therapy Initial Evaluation  Subjective:  The history is provided by the patient. No  was used.   Therapist Generated HPI Evaluation  Problem details: The right knee no longer has pain, but the left knee has arthritis according to Dr. Davila. He says his left knee feels \"Swollen\" and feels knee pain when he squats or flexes his knee. He will use a hot spa at [facility] which will help. Up and Down stairs is difficult on the knee. Has been going on for a month. He is unsure of a cause, but may be related to him going to the gym (Has been playing basketball at Home and has been working out at [facility])..         Type of problem:  Left knee.    This is a new condition.  Condition occurred with:  Repetition/overuse and insidious onset.  Where condition occurred: for unknown reasons.  Patient reports pain:  Medial and lateral.  Pain quality: Dull Pain.     Since onset symptoms are gradually worsening.  Associated symptoms:  Loss of motion/stiffness and loss of strength. Symptoms are exacerbated by descending stairs, bending/squatting and ascending stairs  and relieved by heat.  Special tests included:  X-ray.    Restrictions due to condition include:  Working in normal job without restrictions.  Barriers include:  None as reported by patient.    Patient Health History  Biao He being seen for Left Knee Pain.     Problem began: 2/7/2022.   Problem occurred: Not Sure   Pain is reported as 3/10 on pain scale.  General health as reported by patient is good.                     Primary job tasks include:  Computer work.                                    Objective:  System                                                Knee Evaluation:  ROM:    AROM    Hyperextension:  Left:  0    Right: 0  Extension:  Left: 0    Right:  0  Flexion: Left: 128    Right: 135          Ligament Testing:    Varus 0:  Left:  Neg     Varus 30:  Left:  Neg    Valgus 0:  Left:  Gr I    Valgus 30:  Left:  Neg  "     Anterior Drawer:  Left:  Neg      Posterior Drawer: Left:  Neg    Lachman's:  Left:  Neg    Special Tests: Normal    Left knee negative for the following special tests:  Plica; Meninscal and Patellar compression    Palpation:    Left knee tenderness present at:  Patellar Tendon; Patellar Superior and Patellar Inferior    Edema:  Normal    Mobility Testing:  Normal                  General     ROS    Assessment/Plan:    Patient is a 51 year old male with left side knee complaints.    Patient has the following significant findings with corresponding treatment plan.                Diagnosis 1:  Left Knee Pain  Pain -  hot/cold therapy, US, manual therapy, splint/taping/bracing/orthotics, self management, education, directional preference exercise and home program  Decreased ROM/flexibility - manual therapy, therapeutic exercise, therapeutic activity and home program  Decreased strength - therapeutic exercise, therapeutic activities and home program    Therapy Evaluation Codes:   1) History comprised of:   Personal factors that impact the plan of care:      None.    Comorbidity factors that impact the plan of care are:      None.     Medications impacting care: None.  2) Examination of Body Systems comprised of:   Body structures and functions that impact the plan of care:      Knee.   Activity limitations that impact the plan of care are:      Bending, Jumping, Running, Sitting, Squatting/kneeling and Stairs.  3) Clinical presentation characteristics are:   Stable/Uncomplicated.  4) Decision-Making    Low complexity using standardized patient assessment instrument and/or measureable assessment of functional outcome.  Cumulative Therapy Evaluation is: Low complexity.    Previous and current functional limitations:  (See Goal Flow Sheet for this information)    Short term and Long term goals: (See Goal Flow Sheet for this information)     Communication ability:  Patient appears to be able to clearly communicate and  understand verbal and written communication and follow directions correctly.  Treatment Explanation - The following has been discussed with the patient:   RX ordered/plan of care  Anticipated outcomes  Possible risks and side effects  This patient would benefit from PT intervention to resume normal activities.   Rehab potential is good.    Frequency:  One X week, once daily  Duration:  for 6 weeks  Discharge Plan:  Achieve all LTG.  Independent in home treatment program.  Reach maximal therapeutic benefit.    Please refer to the daily flowsheet for treatment today, total treatment time and time spent performing 1:1 timed codes.

## 2022-03-17 ENCOUNTER — THERAPY VISIT (OUTPATIENT)
Dept: PHYSICAL THERAPY | Facility: CLINIC | Age: 52
End: 2022-03-17
Payer: COMMERCIAL

## 2022-03-17 DIAGNOSIS — M25.562 LEFT KNEE PAIN: ICD-10-CM

## 2022-03-17 PROCEDURE — 97110 THERAPEUTIC EXERCISES: CPT | Mod: GP

## 2022-03-17 PROCEDURE — 97140 MANUAL THERAPY 1/> REGIONS: CPT | Mod: GP

## 2022-03-17 ASSESSMENT — ACTIVITIES OF DAILY LIVING (ADL)
SQUAT: ACTIVITY IS MINIMALLY DIFFICULT
RISE FROM A CHAIR: ACTIVITY IS NOT DIFFICULT
GO DOWN STAIRS: ACTIVITY IS SOMEWHAT DIFFICULT
WEAKNESS: I HAVE THE SYMPTOM BUT IT DOES NOT AFFECT MY ACTIVITY
SIT WITH YOUR KNEE BENT: ACTIVITY IS MINIMALLY DIFFICULT
KNEE_ACTIVITY_OF_DAILY_LIVING_SCORE: 85.71
WALK: ACTIVITY IS NOT DIFFICULT
STIFFNESS: I DO NOT HAVE THE SYMPTOM
LIMPING: I DO NOT HAVE THE SYMPTOM
STAND: ACTIVITY IS NOT DIFFICULT
PAIN: THE SYMPTOM AFFECTS MY ACTIVITY SLIGHTLY
KNEE_ACTIVITY_OF_DAILY_LIVING_SUM: 60
SWELLING: I HAVE THE SYMPTOM BUT IT DOES NOT AFFECT MY ACTIVITY
GO UP STAIRS: ACTIVITY IS SOMEWHAT DIFFICULT
GIVING WAY, BUCKLING OR SHIFTING OF KNEE: I DO NOT HAVE THE SYMPTOM
HOW_WOULD_YOU_RATE_THE_OVERALL_FUNCTION_OF_YOUR_KNEE_DURING_YOUR_USUAL_DAILY_ACTIVITIES?: NEARLY NORMAL
KNEEL ON THE FRONT OF YOUR KNEE: ACTIVITY IS NOT DIFFICULT
RAW_SCORE: 60
AS_A_RESULT_OF_YOUR_KNEE_INJURY,_HOW_WOULD_YOU_RATE_YOUR_CURRENT_LEVEL_OF_DAILY_ACTIVITY?: NEARLY NORMAL

## 2022-03-24 ENCOUNTER — THERAPY VISIT (OUTPATIENT)
Dept: PHYSICAL THERAPY | Facility: CLINIC | Age: 52
End: 2022-03-24
Payer: COMMERCIAL

## 2022-03-24 DIAGNOSIS — M25.562 LEFT KNEE PAIN: ICD-10-CM

## 2022-03-24 PROCEDURE — 97110 THERAPEUTIC EXERCISES: CPT | Mod: GP

## 2022-03-31 ENCOUNTER — THERAPY VISIT (OUTPATIENT)
Dept: PHYSICAL THERAPY | Facility: CLINIC | Age: 52
End: 2022-03-31
Payer: COMMERCIAL

## 2022-03-31 DIAGNOSIS — M25.562 LEFT KNEE PAIN: ICD-10-CM

## 2022-03-31 PROCEDURE — 97110 THERAPEUTIC EXERCISES: CPT | Mod: GP

## 2022-04-18 ENCOUNTER — THERAPY VISIT (OUTPATIENT)
Dept: PHYSICAL THERAPY | Facility: CLINIC | Age: 52
End: 2022-04-18
Payer: COMMERCIAL

## 2022-04-18 DIAGNOSIS — M25.562 LEFT KNEE PAIN: Primary | ICD-10-CM

## 2022-04-18 PROCEDURE — 97110 THERAPEUTIC EXERCISES: CPT | Mod: GP

## 2022-04-25 ENCOUNTER — THERAPY VISIT (OUTPATIENT)
Dept: PHYSICAL THERAPY | Facility: CLINIC | Age: 52
End: 2022-04-25
Payer: COMMERCIAL

## 2022-04-25 DIAGNOSIS — M25.562 LEFT KNEE PAIN: Primary | ICD-10-CM

## 2022-04-25 PROCEDURE — 97110 THERAPEUTIC EXERCISES: CPT | Mod: GP

## 2022-04-25 ASSESSMENT — ACTIVITIES OF DAILY LIVING (ADL)
LIMPING: I DO NOT HAVE THE SYMPTOM
KNEE_ACTIVITY_OF_DAILY_LIVING_SUM: 69
WEAKNESS: I HAVE THE SYMPTOM BUT IT DOES NOT AFFECT MY ACTIVITY
RISE FROM A CHAIR: ACTIVITY IS NOT DIFFICULT
STIFFNESS: I DO NOT HAVE THE SYMPTOM
GO DOWN STAIRS: ACTIVITY IS NOT DIFFICULT
WALK: ACTIVITY IS NOT DIFFICULT
GO UP STAIRS: ACTIVITY IS NOT DIFFICULT
AS_A_RESULT_OF_YOUR_KNEE_INJURY,_HOW_WOULD_YOU_RATE_YOUR_CURRENT_LEVEL_OF_DAILY_ACTIVITY?: NORMAL
SIT WITH YOUR KNEE BENT: ACTIVITY IS NOT DIFFICULT
STAND: ACTIVITY IS NOT DIFFICULT
KNEEL ON THE FRONT OF YOUR KNEE: ACTIVITY IS NOT DIFFICULT
RAW_SCORE: 69
KNEE_ACTIVITY_OF_DAILY_LIVING_SCORE: 98.57
HOW_WOULD_YOU_RATE_THE_OVERALL_FUNCTION_OF_YOUR_KNEE_DURING_YOUR_USUAL_DAILY_ACTIVITIES?: NORMAL
SWELLING: I DO NOT HAVE THE SYMPTOM
PAIN: I DO NOT HAVE THE SYMPTOM
HOW_WOULD_YOU_RATE_THE_CURRENT_FUNCTION_OF_YOUR_KNEE_DURING_YOUR_USUAL_DAILY_ACTIVITIES_ON_A_SCALE_FROM_0_TO_100_WITH_100_BEING_YOUR_LEVEL_OF_KNEE_FUNCTION_PRIOR_TO_YOUR_INJURY_AND_0_BEING_THE_INABILITY_TO_PERFORM_ANY_OF_YOUR_USUAL_DAILY_ACTIVITIES?: 98
SQUAT: ACTIVITY IS NOT DIFFICULT
GIVING WAY, BUCKLING OR SHIFTING OF KNEE: I DO NOT HAVE THE SYMPTOM

## 2022-04-25 NOTE — PROGRESS NOTES
Subjective:  HPI  Physical Exam       Knee Activity of Daily Living Score: 98.57            Objective:  System    Physical Exam    General     ROS    Assessment/Plan:    DISCHARGE REPORT    Progress reporting period is from 3/7/2022 to 4/25/2022.       SUBJECTIVE   Subjective: Reports his knee feels like last week. He doesn't have any pain and feels ready to discharge.    Current Pain level: 0/10.     Initial Pain level: 3/10.   Changes in function:  Yes (See Goal flowsheet attached for changes in current functional level)  Adverse reaction to treatment or activity: None    OBJECTIVE  Changes noted in objective findings:  Yes  Objective: Left Heel Slide ROM: 0-135 (No reported tightness in the knee), Left LE MMT: All Full 5/5 Strength without Pain. SLR Quad Endurance Test: 25 before fatigue/extensor lag set in, Knee Outcome Score: 69 (98.57%)     ASSESSMENT/PLAN  Updated problem list and treatment plan: Diagnosis 1:  Left Knee Pain  Decreased strength - therapeutic exercise, therapeutic activities and home program  STG/LTGs have been met or progress has been made towards goals:  Yes (See Goal flow sheet completed today.)  Assessment of Progress: The patient's condition is improving.  Self Management Plans:  Patient has been instructed in a home treatment program.  Patient is independent in a home treatment program.  Patient  has been instructed in self management of symptoms.  Patient is independent in self management of symptoms.  I have re-evaluated this patient and find that the nature, scope, duration and intensity of the therapy is appropriate for the medical condition of the patient.  Biao continues to require the following intervention to meet STG and LTG's:  PT intervention is no longer required to meet STG/LTG.    Recommendations:  This patient is ready to be discharged from therapy and continue their home treatment program.    Please refer to the daily flowsheet for treatment today, total treatment time  and time spent performing 1:1 timed codes.

## 2022-05-14 ENCOUNTER — HEALTH MAINTENANCE LETTER (OUTPATIENT)
Age: 52
End: 2022-05-14

## 2022-06-30 ENCOUNTER — THERAPY VISIT (OUTPATIENT)
Dept: PHYSICAL THERAPY | Facility: CLINIC | Age: 52
End: 2022-06-30
Payer: COMMERCIAL

## 2022-06-30 DIAGNOSIS — M25.562 LEFT KNEE PAIN: Primary | ICD-10-CM

## 2022-06-30 PROCEDURE — 97164 PT RE-EVAL EST PLAN CARE: CPT | Mod: GP

## 2022-06-30 PROCEDURE — 97110 THERAPEUTIC EXERCISES: CPT | Mod: GP

## 2022-06-30 NOTE — PROGRESS NOTES
"Physical Therapy Initial Evaluation  Subjective:  The history is provided by the patient. No  was used.   Patient Health History  Biao He being seen for Left Knee Pain.     Problem began: 6/1/2022.   Problem occurred: Unknown   Pain is reported as 6/10 on pain scale.  General health as reported by patient is good.  Pertinent medical history includes: none.   Red flags:  None as reported by patient.                                  Therapist Generated HPI Evaluation  Problem details: When he finished therapy his left knee was feeling pretty good.  However as he was mowing his lawn and pushing the mower up/down hills, he would notice some left knee medial knee pain.  He also still notices that if he sits for to long he will get some left knee pain and left knee weakness. He says the pain is always in the left medial knee right on the joint line near the MCL location. The pain isn't nearly as bad as before and he can still run, jump and exercise without pain. He did keep doing his exercises after discharge, but then slowed how often he was doing them. Now he doesn't notice much benefit from.         Type of problem:  Left knee.    This is a recurrent condition.  Condition occurred with:  Insidious onset.  Where condition occurred: for unknown reasons.  Patient reports pain:  In the joint and medial.  Pain is described as aching (\"not a pain, but an uncomfortable\" medial knee) and is intermittent.  Pain is the same all the time.  Since onset symptoms are gradually worsening.  Associated symptoms:  Loss of motion/stiffness, loss of strength and buckling/giving out. Symptoms are exacerbated by activity, standing, sitting, transfers, ascending stairs, descending stairs and bending/squatting      Previous treatment includes physical therapy. There was significant improvement following previous treatment.  Restrictions due to condition include:  Working in normal job without restrictions.  Barriers " include:  None as reported by patient.                        Objective:  System                                                Knee Evaluation:  ROM:    AROM    Hyperextension:  Left:  2    Right: 2  Extension:  Left: 0    Right:  0  Flexion: Left: 140    Right: 140    Pain: Pain with left dorsiflexion. Pain at end range left knee flexion.  Pain at end range left knee extension.    Strength:     Extension:  Left: 5/5   Strong/pain free  Pain:        Flexion:  Left: 4+/5   Strong/pain free  Pain:        Quad Set Left: Good    Pain:   Quad Set Right: Good    Pain:  Ligament Testing:  Ligament testing knee: No ligament laxity but pain reproduced with genu valgus stress test along medial joint line near MCL.  Varus 0:  Left:  Neg     Varus 30:  Left:  Neg    Valgus 0:  Left:  Neg    Valgus 30:  Left:  Pos    Right:  Neg  Anterior Drawer:  Left:  Neg      Posterior Drawer: Left:  Neg    Lachman's:  Left:  Neg    Special Tests: Special test for knee: Mild medial knee discomfort with McMurrays.  Left knee positive for the following special tests:  Meniscal  Left knee negative for the following special tests:  Patellar compression; Rotary Instability-Anterior Medial; Rotary Instability-Anterior Lateral; Rotary Instability-Posterior Medial; Rotary Instability-Posterior Lateral; Patellar Tracking-Abduction Medial and Patellar Tracking-Abduction Lateral    Palpation:    Left knee tenderness present at:  Medial Joint Line      Mobility Testing:      Patellofemoral Medial:  Left: normal      Patellofemoral Lateral:  Left: normal      Patellofemoral Superior:  Left: normal      Patellofemoral Inferior:  Left: normal            General     ROS    Assessment/Plan:    Patient is a 52 year old male with left side knee complaints.    Patient has the following significant findings with corresponding treatment plan.                Diagnosis 1:  Left Knee Pain  Pain -  hot/cold therapy, manual therapy, self management, education,  directional preference exercise and home program  Decreased ROM/flexibility - manual therapy, therapeutic exercise, therapeutic activity and home program  Decreased strength - therapeutic exercise, therapeutic activities and home program    Therapy Evaluation Codes:   1) History comprised of:   Personal factors that impact the plan of care:      None.    Comorbidity factors that impact the plan of care are:      None.     Medications impacting care: None.  2) Examination of Body Systems comprised of:   Body structures and functions that impact the plan of care:      Knee.   Activity limitations that impact the plan of care are:      Sitting and Standing.  3) Clinical presentation characteristics are:   Stable/Uncomplicated.  4) Decision-Making    Low complexity using standardized patient assessment instrument and/or measureable assessment of functional outcome.  Cumulative Therapy Evaluation is: Low complexity.    Previous and current functional limitations:  (See Goal Flow Sheet for this information)    Short term and Long term goals: (See Goal Flow Sheet for this information)     Communication ability:  Patient appears to be able to clearly communicate and understand verbal and written communication and follow directions correctly.  Treatment Explanation - The following has been discussed with the patient:   RX ordered/plan of care  Anticipated outcomes  Possible risks and side effects  This patient would benefit from PT intervention to resume normal activities.   Rehab potential is good.    Frequency:  One X week, once daily  Duration:  for 8 weeks  Discharge Plan:  Achieve all LTG.  Independent in home treatment program.  Reach maximal therapeutic benefit.    Please refer to the daily flowsheet for treatment today, total treatment time and time spent performing 1:1 timed codes.

## 2022-07-05 ENCOUNTER — TELEPHONE (OUTPATIENT)
Dept: ORTHOPEDICS | Facility: CLINIC | Age: 52
End: 2022-07-05

## 2022-07-05 NOTE — TELEPHONE ENCOUNTER
[facility]    Phone Message:  Pt was contacted to schedule a MRI, but the order needs to be entered into Epic.      Please enter MRI order.  Thank you.    Please contact the pt, once the order has been entered, so the pt knows when to schedule his MRI.  Thank you.    May a detailed message be left on voicemail: Yes     Reason for Call: Other: MRI ORDER NEEDED     Action Taken: Message routed to:  [facility] ([facility]): Team to MD    Travel Screening: Not Applicable

## 2022-07-05 NOTE — TELEPHONE ENCOUNTER
Called and talked with patient, his last appointment on 3/3/22 and was stated that he could get an MRI if he didn't have improvement with PT and meds. Patient does report improvement with PT and now notes a slight amount of pain with getting up from a sitting position.   Explained to patient that he would not need to get an MRI before this appointment. He expressed understanding.  Mae Peters RN

## 2022-07-07 ENCOUNTER — OFFICE VISIT (OUTPATIENT)
Dept: ORTHOPEDICS | Facility: CLINIC | Age: 52
End: 2022-07-07
Payer: COMMERCIAL

## 2022-07-07 DIAGNOSIS — M25.562 CHRONIC PAIN OF LEFT KNEE: Primary | ICD-10-CM

## 2022-07-07 DIAGNOSIS — G89.29 CHRONIC PAIN OF LEFT KNEE: Primary | ICD-10-CM

## 2022-07-07 PROCEDURE — 99213 OFFICE O/P EST LOW 20 MIN: CPT | Performed by: ORTHOPAEDIC SURGERY

## 2022-07-07 ASSESSMENT — PAIN SCALES - GENERAL: PAINLEVEL: MILD PAIN (3)

## 2022-07-07 NOTE — PROGRESS NOTES
Leobardo returns to my clinic today for interval follow-up very pleasant 52-year-old male.  I have been under the assumption that he has had a meniscus tear.  We treated him nonsurgically.  He actually did well.  He gives a SANE score of 90 today though he notes continued symptoms.  His right knee he gives a 100.  At our last visit we elected to proceed with a course of oral anti-inflammatories, time, and activity modification.  This has helped but he still has symptoms.  He points directly to his medial joint line.    Examination today shows positive Cori's.  Positive medial joint line tenderness.  Less tenderness laterally.  Lachman 0.  No pivot shift.  Range of motion 0 to 135 degrees.  Neurovascular intact.    Clinical assessment: Presumed medial meniscus tear    Plan: MRI left knee.  Follow-up afterwards.  We will plan for a telephone visit.  I am suspicious that this is a meniscus tear and I think probably it is worth working up as he still continues to be symptomatic.

## 2022-07-07 NOTE — NURSING NOTE
Reason For Visit:   Chief Complaint   Patient presents with     Left Knee - RECHECK       ?  No  Occupation Work from home.  Currently working? Yes.  Work status?  Full time.  Date of injury: Gradual onset - pain has been there since January  Date of surgery: No history of shoulder surgeries; no recent shoulder cortisone injections  Smoker: No  Request smoking cessation information: No     Sane Score  Left knee - Affected  Left Knee- 90  Right Knee- 100    Conrad Acosta, EMT

## 2022-07-07 NOTE — LETTER
7/7/2022         RE: Eloise Johnson  [address] [facility] [address]        Dear Colleague,    Thank you for referring your patient, Eloise Johnson, to the [facility]. Please see a copy of my visit note below.    Leobardo returns to my clinic today for interval follow-up very pleasant 52-year-old male.  I have been under the assumption that he has had a meniscus tear.  We treated him nonsurgically.  He actually did well.  He gives a SANE score of 90 today though he notes continued symptoms.  His right knee he gives a 100.  At our last visit we elected to proceed with a course of oral anti-inflammatories, time, and activity modification.  This has helped but he still has symptoms.  He points directly to his medial joint line.    Examination today shows positive Cori's.  Positive medial joint line tenderness.  Less tenderness laterally.  Lachman 0.  No pivot shift.  Range of motion 0 to 135 degrees.  Neurovascular intact.    Clinical assessment: Presumed medial meniscus tear    Plan: MRI left knee.  Follow-up afterwards.  We will plan for a telephone visit.  I am suspicious that this is a meniscus tear and I think probably it is worth working up as he still continues to be symptomatic.          Again, thank you for allowing me to participate in the care of your patient.        Sincerely,        Conrad Davila MD

## 2022-07-21 ENCOUNTER — ANCILLARY PROCEDURE (OUTPATIENT)
Dept: MRI IMAGING | Facility: CLINIC | Age: 52
End: 2022-07-21
Attending: ORTHOPAEDIC SURGERY
Payer: COMMERCIAL

## 2022-07-21 DIAGNOSIS — M25.562 CHRONIC PAIN OF LEFT KNEE: ICD-10-CM

## 2022-07-21 DIAGNOSIS — G89.29 CHRONIC PAIN OF LEFT KNEE: ICD-10-CM

## 2022-07-21 PROCEDURE — 73721 MRI JNT OF LWR EXTRE W/O DYE: CPT | Mod: LT | Performed by: RADIOLOGY

## 2022-07-28 ENCOUNTER — VIRTUAL VISIT (OUTPATIENT)
Dept: ORTHOPEDICS | Facility: CLINIC | Age: 52
End: 2022-07-28
Payer: COMMERCIAL

## 2022-07-28 DIAGNOSIS — M25.562 CHRONIC PAIN OF LEFT KNEE: Primary | ICD-10-CM

## 2022-07-28 DIAGNOSIS — G89.29 CHRONIC PAIN OF LEFT KNEE: Primary | ICD-10-CM

## 2022-07-28 PROCEDURE — 99214 OFFICE O/P EST MOD 30 MIN: CPT | Mod: 95 | Performed by: ORTHOPAEDIC SURGERY

## 2022-07-28 NOTE — LETTER
7/28/2022         RE: Eloise Johnson  [address]  [place] [facility] [address]        Dear Colleague,    Thank you for referring your patient, Eloise Johnson, to the [facility]. Please see a copy of my visit note below.    Reason For Visit: No chief complaint on file.      ?  No  Occupation work from home  Currently working? Yes.  Work status?  Full time.  Date of injury: Gradual onset- pain has been there since january  Date of surgery: none  Type of surgery: none.  Smoker: No  Request smoking cessation information: No    Sane Score  Left knee - Affected  Left Knee- 90  Right Knee- 100      MARIO Prasad          I the opportunity to complete a telephone visit with the patient today to discuss his MRI.  The MRI does confirm a tear of the medial meniscus.  He has had no further cartilage wear and tear.  He comes in today stating that overall hisSANE score of his left knee is 90 versus 100 on the right.  He states that he had a similar tear of his medial meniscus on the contralateral side which responded well to therapy.    He is most interested in doing therapy for his left knee    No examination was completed today as this was a telephone visit.    MRI does show complex tear of the medial meniscus.  I think is amenable to surgery.    Clinical assessment: Symptomatic medial meniscus tear left knee    Plan: Long discussion with the patient.  I told him he had a meniscus tear.  I told him he was a candidate for arthroscopic management.  I discussed with him the pros cons risk and benefits.  We discussed the expected course of recovery.  At this time he like to proceed with therapy.  We will plan to arrange this for him.  He can follow-up with me to discuss surgery at any time.  He has a standing for for arthroscopic meniscectomy.      Again, thank you for allowing me to participate in the care of your patient.        Sincerely,        Conrad Davila MD

## 2022-07-28 NOTE — PROGRESS NOTES
I the opportunity to complete a telephone visit with the patient today to discuss his MRI.  The MRI does confirm a tear of the medial meniscus.  He has had no further cartilage wear and tear.  He comes in today stating that overall hisSANE score of his left knee is 90 versus 100 on the right.  He states that he had a similar tear of his medial meniscus on the contralateral side which responded well to therapy.    He is most interested in doing therapy for his left knee    No examination was completed today as this was a telephone visit.    MRI does show complex tear of the medial meniscus.  I think is amenable to surgery.    Clinical assessment: Symptomatic medial meniscus tear left knee    Plan: Long discussion with the patient.  I told him he had a meniscus tear.  I told him he was a candidate for arthroscopic management.  I discussed with him the pros cons risk and benefits.  We discussed the expected course of recovery.  At this time he like to proceed with therapy.  We will plan to arrange this for him.  He can follow-up with me to discuss surgery at any time.  He has a standing for for arthroscopic meniscectomy.

## 2022-07-28 NOTE — PROGRESS NOTES
Reason For Visit: No chief complaint on file.      ?  No  Occupation work from home  Currently working? Yes.  Work status?  Full time.  Date of injury: Gradual onset- pain has been there since january  Date of surgery: none  Type of surgery: none.  Smoker: No  Request smoking cessation information: No    Sane Score  Left knee - Affected  Left Knee- 90  Right Knee- 100      Joe Sparrow, EMT

## 2022-09-03 ENCOUNTER — HEALTH MAINTENANCE LETTER (OUTPATIENT)
Age: 52
End: 2022-09-03

## 2022-09-21 ENCOUNTER — THERAPY VISIT (OUTPATIENT)
Dept: PHYSICAL THERAPY | Facility: CLINIC | Age: 52
End: 2022-09-21
Payer: COMMERCIAL

## 2022-09-21 DIAGNOSIS — M25.562 LEFT KNEE PAIN, UNSPECIFIED CHRONICITY: Primary | ICD-10-CM

## 2022-09-21 PROCEDURE — 97161 PT EVAL LOW COMPLEX 20 MIN: CPT | Mod: GP | Performed by: PHYSICAL THERAPIST

## 2022-09-21 PROCEDURE — 97110 THERAPEUTIC EXERCISES: CPT | Mod: GP | Performed by: PHYSICAL THERAPIST

## 2022-09-21 NOTE — PROGRESS NOTES
Physical Therapy Initial Evaluation  Subjective:  The history is provided by the patient. No  was used.   Patient Health History  Biao He being seen for Left knee.       Problem occurred: Unknown   Pain is reported as 0/10 on pain scale.  General health as reported by patient is good.  Pertinent medical history includes: high blood pressure.   Red flags:  None as reported by patient.     Surgeries include:  None.    Current medications:  None.       Primary job tasks include:  Computer work.                  Therapist Generated HPI Evaluation  Problem details: Left knee pain started a few months ago.  It was found per MRI that there was a meniscal tear.  Pain has abolished but he would like some exercises to assist with strength and avoid reoccurance of injury. .         Type of problem:  Left knee.    This is a new condition.  Condition occurred with:  Insidious onset.  Where condition occurred: for unknown reasons.          Symptoms are exacerbated by bending/squatting, ascending stairs and descending stairs    Special tests included:  MRI.                            Objective:  System                                           Hip Evaluation    Hip Strength:      Extension:  Left: 4/5  Pain:Right: 5/5    Pain:    Abduction:  Left: 4/5     Pain:Right: 5/5    Pain:                           Knee Evaluation:  ROM:    AROM    Hyperextension: Left:  10    Right:    Flexion: Left: 140   Right:    Pain: Slight discomfort at end-rage flexion    Strength:     Extension:  Left: 4/5   Pain:      Right: 5/5   Pain:  Flexion:  Left: 4/5   Pain:      Right: 5/5   Pain:          Special Tests:   Left knee positive for the following special tests:  Meniscal                General     ROS    Assessment/Plan:    Patient is a 52 year old male with left side knee complaints.    Patient has the following significant findings with corresponding treatment plan.                Diagnosis 1:  Left Knee pain  Decreased  strength - therapeutic exercise and therapeutic activities  Impaired muscle performance - neuro re-education  Decreased function - therapeutic activities    Previous and current functional limitations:  (See Goal Flow Sheet for this information)    Short term and Long term goals: (See Goal Flow Sheet for this information)     Communication ability:  Patient appears to be able to clearly communicate and understand verbal and written communication and follow directions correctly.  Treatment Explanation - The following has been discussed with the patient:   RX ordered/plan of care  Anticipated outcomes  Possible risks and side effects  This patient would benefit from PT intervention to resume normal activities.   Rehab potential is good.    Frequency:  1 X week, once daily  Duration:  for 4 weeks  Discharge Plan:  Achieve all LTG.  Independent in home treatment program.  Reach maximal therapeutic benefit.    Please refer to the daily flowsheet for treatment today, total treatment time and time spent performing 1:1 timed codes.

## 2022-09-28 ENCOUNTER — THERAPY VISIT (OUTPATIENT)
Dept: PHYSICAL THERAPY | Facility: CLINIC | Age: 52
End: 2022-09-28
Payer: COMMERCIAL

## 2022-09-28 DIAGNOSIS — M25.562 LEFT KNEE PAIN: Primary | ICD-10-CM

## 2022-09-28 PROCEDURE — 97110 THERAPEUTIC EXERCISES: CPT | Mod: GP | Performed by: PHYSICAL THERAPIST

## 2022-09-28 NOTE — PROGRESS NOTES
Subjective:  HPI  Physical Exam                    Objective:  System    Physical Exam    General     ROS    Assessment/Plan:    DISCHARGE  REPORT    Progress reporting period is from 9/21/2022 to 9/28/2022.       SUBJECTIVE  Subjective changes noted by patient:   Patient reports the knee continues to do well.  Feels he is ready to progress independently and will continue to strengthen 3x/day.  He will followup as needed in the future.    Current pain level is 0/10.     Previous pain level was  0/10.   Changes in function:  Yes (See Goal flowsheet attached for changes in current functional level)  Adverse reaction to treatment or activity: None    OBJECTIVE  Changes noted in objective findings:  Yes,     AROM 10-0-140     Knee Flexion 4+/5   Knee Extension 4+/5     ASSESSMENT/PLAN  Updated problem list and treatment plan: Diagnosis 1:  Left Knee    STG/LTGs have been met or progress has been made towards goals:  Yes (See Goal flow sheet completed today.)  Assessment of Progress: The patient's condition is improving.  Self Management Plans:  Patient is independent in a home treatment program.  Patient is independent in self management of symptoms.  I have re-evaluated this patient and find that the nature, scope, duration and intensity of the therapy is appropriate for the medical condition of the patient.  Biao continues to require the following intervention to meet STG and LTG's:  PT    Recommendations:  This patient is ready to be discharged from therapy and continue their home treatment program.    Please refer to the daily flowsheet for treatment today, total treatment time and time spent performing 1:1 timed codes.

## 2022-10-21 ENCOUNTER — OFFICE VISIT (OUTPATIENT)
Dept: FAMILY MEDICINE | Facility: CLINIC | Age: 52
End: 2022-10-21
Payer: COMMERCIAL

## 2022-10-21 VITALS
WEIGHT: 170.8 LBS | HEIGHT: 71 IN | OXYGEN SATURATION: 100 % | BODY MASS INDEX: 23.91 KG/M2 | TEMPERATURE: 97.7 F | SYSTOLIC BLOOD PRESSURE: 134 MMHG | HEART RATE: 96 BPM | DIASTOLIC BLOOD PRESSURE: 95 MMHG | RESPIRATION RATE: 18 BRPM

## 2022-10-21 DIAGNOSIS — E78.2 MIXED HYPERLIPIDEMIA: Chronic | ICD-10-CM

## 2022-10-21 DIAGNOSIS — R03.0 ELEVATED BLOOD PRESSURE READING WITHOUT DIAGNOSIS OF HYPERTENSION: ICD-10-CM

## 2022-10-21 DIAGNOSIS — Z00.00 ANNUAL PHYSICAL EXAM: Primary | ICD-10-CM

## 2022-10-21 DIAGNOSIS — I42.9 CARDIOMYOPATHY OF UNDETERMINED TYPE (H): Chronic | ICD-10-CM

## 2022-10-21 DIAGNOSIS — Z82.49 FAMILY HISTORY OF EARLY CAD: Chronic | ICD-10-CM

## 2022-10-21 DIAGNOSIS — G47.33 OSA (OBSTRUCTIVE SLEEP APNEA): Chronic | ICD-10-CM

## 2022-10-21 DIAGNOSIS — K59.09 OTHER CONSTIPATION: ICD-10-CM

## 2022-10-21 DIAGNOSIS — R73.01 IFG (IMPAIRED FASTING GLUCOSE): ICD-10-CM

## 2022-10-21 PROCEDURE — 99386 PREV VISIT NEW AGE 40-64: CPT | Performed by: INTERNAL MEDICINE

## 2022-10-21 PROCEDURE — 99213 OFFICE O/P EST LOW 20 MIN: CPT | Mod: 25 | Performed by: INTERNAL MEDICINE

## 2022-10-21 RX ORDER — DOCUSATE SODIUM 100 MG/1
100 CAPSULE, LIQUID FILLED ORAL 2 TIMES DAILY
Qty: 100 CAPSULE | Refills: 0 | Status: CANCELLED | OUTPATIENT
Start: 2022-10-21

## 2022-10-21 RX ORDER — TRAZODONE HYDROCHLORIDE 50 MG/1
TABLET, FILM COATED ORAL
COMMUNITY
Start: 2022-10-13 | End: 2022-10-21

## 2022-10-21 ASSESSMENT — ENCOUNTER SYMPTOMS
HEMATOCHEZIA: 0
SHORTNESS OF BREATH: 0
FREQUENCY: 0
HEARTBURN: 0
NAUSEA: 0
DIARRHEA: 0
PARESTHESIAS: 0
HEADACHES: 0
CHILLS: 0
MYALGIAS: 0
PALPITATIONS: 0
HEMATURIA: 0
FEVER: 0
EYE PAIN: 0
SORE THROAT: 0
DYSURIA: 0
NERVOUS/ANXIOUS: 0
CONSTIPATION: 1
COUGH: 0
ARTHRALGIAS: 0
WEAKNESS: 0
JOINT SWELLING: 0
DIZZINESS: 0
ABDOMINAL PAIN: 0

## 2022-10-21 ASSESSMENT — PAIN SCALES - GENERAL: PAINLEVEL: NO PAIN (0)

## 2022-10-21 NOTE — PROGRESS NOTES
SUBJECTIVE:   CC: Leobardo is an 52 year old who presents for preventative health visit.     57-year-old gentleman comes in for a annual physical examination.  He has history of obstructive sleep apnea, mixed dyslipidemia, impaired fasting glucose and nonischemic cardiomyopathy.  He is taking rosuvastatin 10 mg a day.  It was started a year ago by his cardiologist.  He does not use CPAP because he cannot tolerate it.  He states he is doing fine and has no chest pain or shortness of breath.  No leg edema.  He exercises twice a week by going to the gym.  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  Lately for the past month he has been experiencing constipation and has started taking MiraLAX.      Patient has been advised of split billing requirements and indicates understanding: Yes  Healthy Habits:     Getting at least 3 servings of Calcium per day:  Yes    Bi-annual eye exam:  Yes    Dental care twice a year:  Yes    Sleep apnea or symptoms of sleep apnea:  Sleep apnea    Diet:  Regular (no restrictions)    Frequency of exercise:  2-3 days/week    Duration of exercise:  N/A    Taking medications regularly:  Yes    Medication side effects:  Not applicable    PHQ-2 Total Score: 0    Additional concerns today:  No              Today's PHQ-2 Score:   PHQ-2 ( 1999 Pfizer) 10/21/2022   Q1: Little interest or pleasure in doing things 0   Q2: Feeling down, depressed or hopeless 0   PHQ-2 Score 0   PHQ-2 Total Score (12-17 Years)- Positive if 3 or more points; Administer PHQ-A if positive -   Q1: Little interest or pleasure in doing things Not at all   Q2: Feeling down, depressed or hopeless Not at all   PHQ-2 Score 0       Abuse: Current or Past(Physical, Sexual or Emotional)- No  Do you feel safe in your environment? Yes    Have you ever done Advance Care Planning? (For example, a Health Directive, POLST, or a discussion with a medical provider or your loved ones about your wishes): No, advance care  planning information given to patient to review.  Patient declined advance care planning discussion at this time.    Social History     Tobacco Use     Smoking status: Former     Packs/day: 1.00     Years: 2.00     Pack years: 2.00     Types: Cigarettes     Passive exposure: Never     Smokeless tobacco: Never   Substance Use Topics     Alcohol use: Yes     Alcohol/week: 0.8 - 1.7 standard drinks     Types: 1 - 2 Standard drinks or equivalent per week     Comment: socially. 1 every 2-3 weeks     If you drink alcohol do you typically have >3 drinks per day or >7 drinks per week? No    Alcohol Use 10/21/2022   Prescreen: >3 drinks/day or >7 drinks/week? No   Prescreen: >3 drinks/day or >7 drinks/week? -       Last PSA: No results found for: PSA    Reviewed orders with patient. Reviewed health maintenance and updated orders accordingly - Yes  BP Readings from Last 3 Encounters:   10/21/22 (!) 134/95   11/04/21 138/84   09/21/21 (!) 145/96    Wt Readings from Last 3 Encounters:   10/21/22 77.5 kg (170 lb 12.8 oz)   03/03/22 87.6 kg (193 lb 1.6 oz)   11/04/21 88.9 kg (196 lb)                  Patient Active Problem List   Diagnosis     Mixed hyperlipidemia     Chronic fatigue     High myopia, both eyes     Family history of early CAD     HDL deficiency     Cardiomyopathy of undetermined type (H)     HUEY (obstructive sleep apnea)- moderate (AHI 21)     Acute medial meniscus tear of right knee     Nasal congestion     Left knee pain     IFG (impaired fasting glucose)     Other constipation     Past Surgical History:   Procedure Laterality Date     COLONOSCOPY WITH CO2 INSUFFLATION N/A 10/24/2014    Procedure: COLONOSCOPY WITH CO2 INSUFFLATION;  Surgeon: Duane, William Charles, MD;  Location:  OR      TOOTH EXTRACTION W/FORCEP         Social History     Tobacco Use     Smoking status: Former     Packs/day: 1.00     Years: 2.00     Pack years: 2.00     Types: Cigarettes     Passive exposure: Never     Smokeless tobacco:  Never   Substance Use Topics     Alcohol use: Yes     Alcohol/week: 1.0 standard drink     Types: 1 Cans of beer per week     Comment: socially. 1 every 2-3 weeks     Family History   Problem Relation Age of Onset     Eye Surgery Mother      C.A.D. Father 42     Diabetes Maternal Grandmother      Colon Cancer No family hx of      Glaucoma No family hx of      Macular Degeneration No family hx of      Skin Cancer No family hx of          Current Outpatient Medications   Medication Sig Dispense Refill     cholecalciferol (VITAMIN D3) 25 mcg (1000 units) capsule Take 1 capsule by mouth daily       Multiple Vitamin (MULTI-VITAMIN) per tablet Take 1 tablet by mouth daily.       rosuvastatin (CRESTOR) 10 MG tablet Take 1 tablet (10 mg) by mouth daily (Patient not taking: Reported on 10/21/2022) 90 tablet 3     No Known Allergies    Reviewed and updated as needed this visit by clinical staff   Tobacco  Allergies  Meds   Med Hx  Surg Hx  Fam Hx  Soc Hx        Reviewed and updated as needed this visit by Provider                 Past Medical History:   Diagnosis Date     Anxiety 05/01/2017    resolved 2018     Cardiomyopathy of undetermined type (H) 2015    Idiopathic nonischemic cardiomyopathy. Stress echocardiography 2015 showed an EF of 40%-45% at baseline, which improved with stress to 55%-60%. Stress MRI/MRA 2015 with normal perfusion at rest and post-stress without evidence of inducible ischemia. Normal left ventricular size and mildly reduced systolic functionwith a calculated ejection fraction of  48 %.  Normal right ventricular size and syst     HDL deficiency 04/28/2015     IFG (impaired fasting glucose) 10/21/2022     Mixed hyperlipidemia      HUEY (obstructive sleep apnea)- moderate (AHI 21) 09/06/2018 9/4/2018 [facility] (198.0 lbs) - AHI 21.6, RDI 33.7, Supine AHI 38.1, REM AHI 45.1, Low O2 69.5%, Time Spent ?88% 14.5 minutes / Time Spent ?89% 17.3 minutes.     Other constipation  10/21/2022     Psychophysiological insomnia 05/01/2017    resolved 2018      Past Surgical History:   Procedure Laterality Date     COLONOSCOPY WITH CO2 INSUFFLATION N/A 10/24/2014    Procedure: COLONOSCOPY WITH CO2 INSUFFLATION;  Surgeon: Duane, William Charles, MD;  Location:  OR      TOOTH EXTRACTION W/FORCEP         Review of Systems   Constitutional: Negative for chills and fever.   HENT: Negative for congestion, ear pain, hearing loss and sore throat.    Eyes: Negative for pain and visual disturbance.   Respiratory: Negative for cough and shortness of breath.    Cardiovascular: Negative for chest pain, palpitations and peripheral edema.   Gastrointestinal: Positive for constipation. Negative for abdominal pain, diarrhea, heartburn, hematochezia and nausea.   Genitourinary: Negative for dysuria, frequency, genital sores, hematuria and urgency.   Musculoskeletal: Negative for arthralgias, joint swelling and myalgias.   Skin: Negative for rash.   Neurological: Negative for dizziness, weakness, headaches and paresthesias.   Psychiatric/Behavioral: Negative for mood changes. The patient is not nervous/anxious.      CONSTITUTIONAL: NEGATIVE for fever, chills, change in weight  INTEGUMENTARY/SKIN: NEGATIVE for worrisome rashes, moles or lesions  EYES: NEGATIVE for vision changes or irritation  ENT: NEGATIVE for ear, mouth and throat problems  RESP: NEGATIVE for significant cough or SOB  CV: NEGATIVE for chest pain, palpitations or peripheral edema  GI: NEGATIVE for nausea, abdominal pain, heartburn, or change in bowel habits   male: negative for dysuria, hematuria, decreased urinary stream, erectile dysfunction, urethral discharge  MUSCULOSKELETAL: NEGATIVE for significant arthralgias or myalgia  NEURO: NEGATIVE for weakness, dizziness or paresthesias  ENDOCRINE: NEGATIVE for temperature intolerance, skin/hair changes  HEME/ALLERGY/IMMUNE: NEGATIVE for bleeding problems  PSYCHIATRIC: NEGATIVE for changes in  "mood or affect    OBJECTIVE:   BP (!) 147/105   Pulse 96   Temp 97.7  F (36.5  C)   Resp 18   Ht 1.803 m (5' 11\")   Wt 77.5 kg (170 lb 12.8 oz)   SpO2 100%   BMI 23.82 kg/m      Physical Exam  GENERAL: healthy, alert and no distress  EYES: Eyes grossly normal to inspection, PERRL and conjunctivae and sclerae normal  HENT: ear canals and TM's normal, nose and mouth without ulcers or lesions  NECK: no adenopathy, no asymmetry, masses, or scars and thyroid normal to palpation  RESP: lungs clear to auscultation - no rales, rhonchi or wheezes  CV: regular rate and rhythm, normal S1 S2, no S3 or S4, no murmur, click or rub, no peripheral edema and peripheral pulses strong  ABDOMEN: soft, nontender, no hepatosplenomegaly, no masses and bowel sounds normal   (male): normal male genitalia without lesions or urethral discharge, no hernia  RECTAL: normal sphincter tone, no rectal masses, prostate normal size, smooth, nontender without nodules or masses  MS: no gross musculoskeletal defects noted, no edema  SKIN: no suspicious lesions or rashes  NEURO: Normal strength and tone, mentation intact and speech normal  PSYCH: mentation appears normal, affect normal/bright  LYMPH: no cervical, supraclavicular, axillary, or inguinal adenopathy        ASSESSMENT/PLAN:       1.  Annual physical examination completed is good except for #2  2.  Elevated blood pressure today without diagnosis of hypertension.  Would need to follow-up in the future.  3.  Mixed dyslipidemia with high triglyceride and low HDL.  Currently on rosuvastatin 10 mg a day started by his cardiologist.  We will check his lipids today.  4.  Impaired fasting glucose.  We will check fasting blood sugar A1c and get back to him with recommendation.  5.  Constipation.  Advised to use stool softener like Colace 100 mg to 200 mg a day.  Drink 6 to 8 glasses of water a day and high-fiber diet.  May use MiraLAX on a as needed basis.  6.  Nonischemic cardiomyopathy.  No " "active treatment and currently patient not symptomatic.  Echocardiogram 2015 showed EF of 40 to 45% and cardiac MRI in 2015 showed EF of 48%.  No evidence of perfusion defect.  7.  Family history of early coronary artery disease.  Father  of acute myocardial infarction at age 42.  8.  Obstructive sleep apnea.  Patient unable to tolerate CPAP hence not using.  He has managed to lose about 25 pounds of weight in the past year so that would help with HUEY.  9.  Offered following vaccinations which patient declined : Tdap, influenza, COVID booster, zoster, hepatitis B and Pneumovax 20    We will get back to the results of A1c, fasting blood sugar and lipid.  Will recommend follow-up on blood pressure.      Patient has been advised of split billing requirements and indicates understanding: Yes      COUNSELING:   Reviewed preventive health counseling, as reflected in patient instructions       Regular exercise       Healthy diet/nutrition       Vision screening    Estimated body mass index is 23.82 kg/m  as calculated from the following:    Height as of this encounter: 1.803 m (5' 11\").    Weight as of this encounter: 77.5 kg (170 lb 12.8 oz).         He reports that he has quit smoking. His smoking use included cigarettes. He has a 2.00 pack-year smoking history. He has never been exposed to tobacco smoke. He has never used smokeless tobacco.      Counseling Resources:  ATP IV Guidelines  Pooled Cohorts Equation Calculator  FRAX Risk Assessment  ICSI Preventive Guidelines  Dietary Guidelines for Americans, 2010  USDA's MyPlate  ASA Prophylaxis  Lung CA Screening    Chava Winn MD  [facility]  "

## 2022-10-25 ENCOUNTER — LAB (OUTPATIENT)
Dept: LAB | Facility: CLINIC | Age: 52
End: 2022-10-25
Payer: COMMERCIAL

## 2022-10-25 DIAGNOSIS — R73.01 IFG (IMPAIRED FASTING GLUCOSE): ICD-10-CM

## 2022-10-25 DIAGNOSIS — Z11.59 NEED FOR HEPATITIS C SCREENING TEST: ICD-10-CM

## 2022-10-25 DIAGNOSIS — Z11.4 SCREENING FOR HIV (HUMAN IMMUNODEFICIENCY VIRUS): ICD-10-CM

## 2022-10-25 DIAGNOSIS — E78.2 MIXED HYPERLIPIDEMIA: Chronic | ICD-10-CM

## 2022-10-25 LAB
ALT SERPL W P-5'-P-CCNC: 47 U/L (ref 0–70)
CHOLEST SERPL-MCNC: 180 MG/DL
FASTING STATUS PATIENT QL REPORTED: YES
FASTING STATUS PATIENT QL REPORTED: YES
GLUCOSE BLD-MCNC: 111 MG/DL (ref 70–99)
HBA1C MFR BLD: 5.6 % (ref 0–5.6)
HCV AB SERPL QL IA: NONREACTIVE
HDLC SERPL-MCNC: 32 MG/DL
HIV 1+2 AB+HIV1 P24 AG SERPL QL IA: NONREACTIVE
LDLC SERPL CALC-MCNC: 96 MG/DL
NONHDLC SERPL-MCNC: 148 MG/DL
TRIGL SERPL-MCNC: 259 MG/DL
TSH SERPL DL<=0.005 MIU/L-ACNC: 1.99 MU/L (ref 0.4–4)

## 2022-10-25 PROCEDURE — 84460 ALANINE AMINO (ALT) (SGPT): CPT

## 2022-10-25 PROCEDURE — 36415 COLL VENOUS BLD VENIPUNCTURE: CPT

## 2022-10-25 PROCEDURE — 87389 HIV-1 AG W/HIV-1&-2 AB AG IA: CPT

## 2022-10-25 PROCEDURE — 82947 ASSAY GLUCOSE BLOOD QUANT: CPT

## 2022-10-25 PROCEDURE — 80061 LIPID PANEL: CPT

## 2022-10-25 PROCEDURE — 86803 HEPATITIS C AB TEST: CPT

## 2022-10-25 PROCEDURE — 84443 ASSAY THYROID STIM HORMONE: CPT

## 2022-10-25 PROCEDURE — 83036 HEMOGLOBIN GLYCOSYLATED A1C: CPT

## 2022-11-02 DIAGNOSIS — E78.2 MIXED HYPERLIPIDEMIA: Chronic | ICD-10-CM

## 2022-11-02 RX ORDER — ROSUVASTATIN CALCIUM 10 MG/1
10 TABLET, COATED ORAL DAILY
Qty: 90 TABLET | Refills: 0 | Status: SHIPPED | OUTPATIENT
Start: 2022-11-02 | End: 2023-01-31

## 2023-01-25 ENCOUNTER — VIRTUAL VISIT (OUTPATIENT)
Dept: CARDIOLOGY | Facility: CLINIC | Age: 53
End: 2023-01-25
Payer: COMMERCIAL

## 2023-01-25 DIAGNOSIS — Z82.49 FAMILY HISTORY OF EARLY CAD: Chronic | ICD-10-CM

## 2023-01-25 DIAGNOSIS — R73.01 IFG (IMPAIRED FASTING GLUCOSE): ICD-10-CM

## 2023-01-25 DIAGNOSIS — E78.6 HDL DEFICIENCY: Chronic | ICD-10-CM

## 2023-01-25 DIAGNOSIS — I42.9 CARDIOMYOPATHY OF UNDETERMINED TYPE (H): Primary | Chronic | ICD-10-CM

## 2023-01-25 DIAGNOSIS — E78.2 MIXED HYPERLIPIDEMIA: Chronic | ICD-10-CM

## 2023-01-25 PROCEDURE — 99214 OFFICE O/P EST MOD 30 MIN: CPT | Mod: 95 | Performed by: INTERNAL MEDICINE

## 2023-01-25 RX ORDER — CHLORAL HYDRATE 500 MG
2 CAPSULE ORAL DAILY
COMMUNITY

## 2023-01-25 NOTE — LETTER
"1/25/2023    Fabian Crowder MD  [address] [place] [address]    RE: Eloise Alex       Dear Colleague,     I had the pleasure of seeing Eloise Johnson in the [facility].  Leobardo is a 52 year old who is being evaluated via a billable video visit.      How would you like to obtain your AVS? MyChart  If the video visit is dropped, the invitation should be resent by: Send to e-mail at: [email or web address]  Will anyone else be joining your video visit? No      Review Of Systems  Skin: NEGATIVE  Eyes:Ears/Nose/Throat: NEGATIVE  Respiratory: NEGATIVE  Cardiovascular: positive for fatigue.   Gastrointestinal: NEGATIVE  Genitourinary: more frequency   Musculoskeletal: NEGATIVE  Neurologic: NEGATIVE  Psychiatric: positive for anxiety, personal issues  Hematologic/Lymphatic/Immunologic: NEGATIVE  Endocrine:  NEGATIVE    Telephone number of patient: [phone]    Vitals - Patient Reported  Systolic (Patient Reported): 117  Diastolic (Patient Reported): 83  Weight (Patient Reported): 73 kg (161 lb)  Height (Patient Reported): 175.3 cm (5' 9\")  BMI (Based on Pt Reported Ht/Wt): 23.78      MARIO Fuentes      Video-Visit Details    Type of service:  Video Visit     Originating Location (pt. Location): Home    Distant Location (provider location):  On-site  Platform used for Video Visit: [facility]     General:  no apparent distress, normal body habitus, sitting upright.  ENT/Mouth:  membranes moist, no nasal discharge.  Normal head shape, no apparent injury or laceration.  Eyes:  no scleral icterus, normal conjunctivae.  No observed jaundice.  Neck:  no apparent neck swelling.   Chest/Lungs:  No breathing difficulty while speaking.  No audible wheezing.  No cough during conversation.  Cardiovascular:  No obviously elevated jugular venous pressure.    Extremities:  no apparent cyanosis.  Skin:  no xanthelasma.  No facial lacerations.  Neurologic:  Normal arm motion bilateral, no tremors.    Psychiatric:  " Alert and oriented x3, calm demeanor depressed.  Not suicidal    The rest of the comprehensive physical examination is deferred due to public health emergency video visit restrictions.     24 minutes spent      Thank you for allowing me to participate in the care of your patient.      Sincerely,     Marshal Snyder MD     [facility]  cc:   Marshal Snyder MD  [address] [facility] [place] [address] [place] [id number]

## 2023-01-25 NOTE — PROGRESS NOTES
"Leobardo is a 52 year old who is being evaluated via a billable video visit.      How would you like to obtain your AVS? MyChart  If the video visit is dropped, the invitation should be resent by: Send to e-mail at: [email or web address]  Will anyone else be joining your video visit? No      Review Of Systems  Skin: NEGATIVE  Eyes:Ears/Nose/Throat: NEGATIVE  Respiratory: NEGATIVE  Cardiovascular: positive for fatigue.   Gastrointestinal: NEGATIVE  Genitourinary: more frequency   Musculoskeletal: NEGATIVE  Neurologic: NEGATIVE  Psychiatric: positive for anxiety, personal issues  Hematologic/Lymphatic/Immunologic: NEGATIVE  Endocrine:  NEGATIVE    Telephone number of patient: [phone]    Vitals - Patient Reported  Systolic (Patient Reported): 117  Diastolic (Patient Reported): 83  Weight (Patient Reported): 73 kg (161 lb)  Height (Patient Reported): 175.3 cm (5' 9\")  BMI (Based on Pt Reported Ht/Wt): 23.78      MARIO Fuentes      Video-Visit Details    Type of service:  Video Visit     Originating Location (pt. Location): Home    Distant Location (provider location):  On-site  Platform used for Video Visit: [facility]     General:  no apparent distress, normal body habitus, sitting upright.  ENT/Mouth:  membranes moist, no nasal discharge.  Normal head shape, no apparent injury or laceration.  Eyes:  no scleral icterus, normal conjunctivae.  No observed jaundice.  Neck:  no apparent neck swelling.   Chest/Lungs:  No breathing difficulty while speaking.  No audible wheezing.  No cough during conversation.  Cardiovascular:  No obviously elevated jugular venous pressure.    Extremities:  no apparent cyanosis.  Skin:  no xanthelasma.  No facial lacerations.  Neurologic:  Normal arm motion bilateral, no tremors.    Psychiatric:  Alert and oriented x3, calm demeanor depressed.  Not suicidal    The rest of the comprehensive physical examination is deferred due to public health emergency video visit restrictions.     24 " minutes spent

## 2023-01-26 NOTE — PROGRESS NOTES
Service Date: 01/25/2023    HISTORY OF PRESENT ILLNESS:  Leobardo is a very nice 52-year-old gentleman with past medical history significant for his father dying of myocardial infarction at age 42.  The patient has severe HDL deficiency and hypertriglyceridemia with impaired fasting glucose, all consistent with metabolic syndrome.  A stress test in 2009 demonstrated no evidence of ischemia.  Leobardo has always been quite reluctant to go on any medications.  Ultimately, I convinced him to start a baby dose aspirin and 10 mg of atorvastatin.  He has also been unwilling to do any sort of stress testing involving radiation including a calcium scoring test.  In 2015 I convinced him to undergo an MRI given his cardiomyopathy.  MRI demonstrated normal perfusion.  Ejection fraction was estimated to be 48%.  Stress echo from 2015 demonstrated an ejection fraction of 40%-45%, consistent with a cardiomyopathy of unclear etiology.    Due to COVID, a video visit was conducted with Leobardo today and unfortunately he has gone through a great detail.  He clearly was quite subdued.  He states he has got a lot of general health problems, but he thinks his heart is doing fine.  He is going through a divorce.  He has lost 32 pounds of weight.  He states he is under a great deal of stress and anxiety.  He is seeing a psychologist.  He states he is not suicidal.  His appetite is good.  He has just started doing some exercise, but was doing nothing.    He is having no chest pain, chest discomfort, dyspnea on exertion, orthopnea or PND.  He has no lightheadedness, dizziness, syncope or near syncope.  He has stopped his statin and asks whether he should go back on it.    He reports a blood pressure of 117/83 and a weight of 161 pounds.    ASSESSMENT AND PLAN:  Leobardo has no symptoms at this time to suggest ischemia.    Heart failure appears to be well compensated.  He does not appear to be having any clinical arrhythmias.    He does appear that  he may be depressed.  He is working with a psychologist.  He denies being suicidal.    Blood pressure is well controlled at this time and this is probably due to his weight loss, although I have encouraged him to get back to a regular exercise regimen, extolling the virtues not only for cardiovascular fitness, his cholesterol, blood pressure, but also his psyche, anxiety and depression.    We reviewed the fact that his fasting lipid profile from last fall is a major backslide.  Total cholesterol is 180, HDL is 32, LDL is 96, triglycerides are 259.  I have told him he should restart his rosuvastatin 10 mg daily, and we will recheck a fasting lipid profile in 1 month.  We will also check his glucose at that time because he does have prediabetes.  This may be better with his 32-pound weight loss.  We will check it nonetheless, and hopefully his triglycerides may be better.    When he comes back next year, I would like to recheck his echocardiogram and while we are at it, I will make it a stress echocardiogram to evaluate for any underlying coronary artery disease given his family history.    Thank you for allowing me to participate in his care.    Marshal Snyder MD, [facility]        D: 2023   T: 2023   MT: [email or web address]    Name:     BRIGHT DONIS  MRN:      [id number]        Account:      [id number]   :      1970           Service Date: 2023       Document: [id number]

## 2023-01-31 DIAGNOSIS — E78.2 MIXED HYPERLIPIDEMIA: Chronic | ICD-10-CM

## 2023-01-31 RX ORDER — ROSUVASTATIN CALCIUM 10 MG/1
10 TABLET, COATED ORAL DAILY
Qty: 90 TABLET | Refills: 0 | Status: SHIPPED | OUTPATIENT
Start: 2023-01-31 | End: 2024-06-26

## 2023-06-12 ENCOUNTER — TELEPHONE (OUTPATIENT)
Dept: CARDIOLOGY | Facility: CLINIC | Age: 53
End: 2023-06-12
Payer: COMMERCIAL

## 2023-06-12 NOTE — TELEPHONE ENCOUNTER
Call from patient, he is working on some insurance paperwork and needed some background and historical dates. He requested a copy of his most recent office visit be mailed to his home.  Patient will call [facility] if he needs more in-depth copies of his records.

## 2023-12-09 ENCOUNTER — HEALTH MAINTENANCE LETTER (OUTPATIENT)
Age: 53
End: 2023-12-09

## 2024-02-15 ENCOUNTER — TELEPHONE (OUTPATIENT)
Dept: CARDIOLOGY | Facility: CLINIC | Age: 54
End: 2024-02-15
Payer: COMMERCIAL

## 2024-02-15 DIAGNOSIS — E78.2 MIXED HYPERLIPIDEMIA: Chronic | ICD-10-CM

## 2024-02-16 NOTE — TELEPHONE ENCOUNTER
Spoke with Patient - New insurance is [employer],  Patient will call Insurance to see if Dr. Snyder and [facility] is within their network.  Patient will also find out which Pharmacies are  in network. Will be needing a refill on Rosuvastatin.    Overdue to annual OV  - last Dr. Snyder OV 1-25-23.     Patient called  changed insurance this year and wonders if Insurance covers [facility].    And needs medication refill.     Last Dr. Snyder OV 1-25-23 - Cardiomyopathy.   When he comes back next year, I would like to recheck his echocardiogram and while we are at it, I will make it a stress echocardiogram to evaluate for any underlying coronary artery disease given his family history     
Spoke with patient, his new insurance does not include [facility] nor Dr. Snyder. He is going to ask his PCP to cover the crestor refill. He will be seeking a new cardiology team that is covered by [facility].  
no

## 2024-02-19 ENCOUNTER — TELEPHONE (OUTPATIENT)
Dept: CARDIOLOGY | Facility: CLINIC | Age: 54
End: 2024-02-19
Payer: COMMERCIAL

## 2024-02-19 DIAGNOSIS — E78.2 MIXED HYPERLIPIDEMIA: Primary | ICD-10-CM

## 2024-02-19 DIAGNOSIS — I42.9 CARDIOMYOPATHY OF UNDETERMINED TYPE (H): ICD-10-CM

## 2024-02-19 NOTE — TELEPHONE ENCOUNTER
Message from patient, he rechecked his insurance and Dr. Snyder and [facility] are in network. He would like to schedule his overdue testing and visit.    1625 updated stress echo and lab orders. Patient may try to do labs at his workplace. He will call the [facility] tomorrow.

## 2024-06-20 ENCOUNTER — LAB (OUTPATIENT)
Dept: LAB | Facility: CLINIC | Age: 54
End: 2024-06-20
Payer: COMMERCIAL

## 2024-06-20 ENCOUNTER — ANCILLARY PROCEDURE (OUTPATIENT)
Dept: CARDIOLOGY | Facility: CLINIC | Age: 54
End: 2024-06-20
Attending: INTERNAL MEDICINE
Payer: COMMERCIAL

## 2024-06-20 DIAGNOSIS — I42.9 CARDIOMYOPATHY OF UNDETERMINED TYPE (H): ICD-10-CM

## 2024-06-20 DIAGNOSIS — E78.2 MIXED HYPERLIPIDEMIA: ICD-10-CM

## 2024-06-20 LAB
ALT SERPL W P-5'-P-CCNC: 32 U/L (ref 0–70)
ANION GAP SERPL CALCULATED.3IONS-SCNC: 10 MMOL/L (ref 7–15)
BUN SERPL-MCNC: 12.7 MG/DL (ref 6–20)
CALCIUM SERPL-MCNC: 9.4 MG/DL (ref 8.6–10)
CHLORIDE SERPL-SCNC: 105 MMOL/L (ref 98–107)
CHOLEST SERPL-MCNC: 135 MG/DL
CREAT SERPL-MCNC: 0.8 MG/DL (ref 0.67–1.17)
DEPRECATED HCO3 PLAS-SCNC: 27 MMOL/L (ref 22–29)
EGFRCR SERPLBLD CKD-EPI 2021: >90 ML/MIN/1.73M2
FASTING STATUS PATIENT QL REPORTED: YES
FASTING STATUS PATIENT QL REPORTED: YES
GLUCOSE SERPL-MCNC: 103 MG/DL (ref 70–99)
HDLC SERPL-MCNC: 36 MG/DL
LDLC SERPL CALC-MCNC: 48 MG/DL
NONHDLC SERPL-MCNC: 99 MG/DL
POTASSIUM SERPL-SCNC: 4.2 MMOL/L (ref 3.4–5.3)
SODIUM SERPL-SCNC: 142 MMOL/L (ref 135–145)
TRIGL SERPL-MCNC: 255 MG/DL

## 2024-06-20 PROCEDURE — 93016 CV STRESS TEST SUPVJ ONLY: CPT | Performed by: INTERNAL MEDICINE

## 2024-06-20 PROCEDURE — 80048 BASIC METABOLIC PNL TOTAL CA: CPT

## 2024-06-20 PROCEDURE — 80061 LIPID PANEL: CPT

## 2024-06-20 PROCEDURE — 84460 ALANINE AMINO (ALT) (SGPT): CPT

## 2024-06-20 PROCEDURE — 93321 DOPPLER ECHO F-UP/LMTD STD: CPT | Performed by: INTERNAL MEDICINE

## 2024-06-20 PROCEDURE — 93018 CV STRESS TEST I&R ONLY: CPT | Performed by: INTERNAL MEDICINE

## 2024-06-20 PROCEDURE — 36415 COLL VENOUS BLD VENIPUNCTURE: CPT

## 2024-06-20 PROCEDURE — 93350 STRESS TTE ONLY: CPT | Performed by: INTERNAL MEDICINE

## 2024-06-20 PROCEDURE — 93017 CV STRESS TEST TRACING ONLY: CPT | Performed by: INTERNAL MEDICINE

## 2024-06-20 PROCEDURE — 93325 DOPPLER ECHO COLOR FLOW MAPG: CPT | Performed by: INTERNAL MEDICINE

## 2024-06-20 RX ADMIN — Medication 5 ML: at 11:45

## 2024-06-26 ENCOUNTER — OFFICE VISIT (OUTPATIENT)
Dept: CARDIOLOGY | Facility: CLINIC | Age: 54
End: 2024-06-26
Payer: COMMERCIAL

## 2024-06-26 VITALS
HEIGHT: 72 IN | HEART RATE: 102 BPM | BODY MASS INDEX: 26.01 KG/M2 | SYSTOLIC BLOOD PRESSURE: 142 MMHG | DIASTOLIC BLOOD PRESSURE: 98 MMHG | OXYGEN SATURATION: 96 % | WEIGHT: 192 LBS

## 2024-06-26 DIAGNOSIS — I77.810 ASCENDING AORTA DILATATION (H): ICD-10-CM

## 2024-06-26 DIAGNOSIS — R73.01 IFG (IMPAIRED FASTING GLUCOSE): ICD-10-CM

## 2024-06-26 DIAGNOSIS — E78.6 HDL DEFICIENCY: Primary | Chronic | ICD-10-CM

## 2024-06-26 DIAGNOSIS — Z82.49 FAMILY HISTORY OF EARLY CAD: Chronic | ICD-10-CM

## 2024-06-26 DIAGNOSIS — E78.2 MIXED HYPERLIPIDEMIA: Chronic | ICD-10-CM

## 2024-06-26 DIAGNOSIS — I10 BENIGN ESSENTIAL HYPERTENSION: ICD-10-CM

## 2024-06-26 DIAGNOSIS — I42.9 CARDIOMYOPATHY OF UNDETERMINED TYPE (H): Chronic | ICD-10-CM

## 2024-06-26 DIAGNOSIS — E88.810 METABOLIC SYNDROME X: ICD-10-CM

## 2024-06-26 PROCEDURE — G2211 COMPLEX E/M VISIT ADD ON: HCPCS | Performed by: INTERNAL MEDICINE

## 2024-06-26 PROCEDURE — 99215 OFFICE O/P EST HI 40 MIN: CPT | Performed by: INTERNAL MEDICINE

## 2024-06-26 RX ORDER — LOSARTAN POTASSIUM 50 MG/1
50 TABLET ORAL DAILY
Qty: 90 TABLET | Refills: 4 | Status: SHIPPED | OUTPATIENT
Start: 2024-06-26

## 2024-06-26 RX ORDER — ROSUVASTATIN CALCIUM 10 MG/1
10 TABLET, COATED ORAL DAILY
Qty: 90 TABLET | Refills: 4 | Status: SHIPPED | OUTPATIENT
Start: 2024-06-26

## 2024-06-26 NOTE — PROGRESS NOTES
HPI and Plan:   Leobardo is a very nice 54-year-old gentleman with past medical history significant for his father dying of myocardial infarction at age 42.  The patient has severe HDL deficiency and hypertriglyceridemia with impaired fasting glucose, all consistent with metabolic syndrome.  A stress test in 2009 demonstrated no evidence of ischemia.  Leobardo has always been quite reluctant to go on any medications.  Ultimately, I convinced him to start a baby dose aspirin and 10 mg of atorvastatin.  He has also been unwilling to do any sort of stress testing involving radiation including a calcium scoring test.  In 2015 I convinced him to undergo an MRI given his cardiomyopathy.  MRI demonstrated normal perfusion.  Ejection fraction was estimated to be 48%.  Stress echo from 2015 demonstrated an ejection fraction of 40%-45%, consistent with a cardiomyopathy of unclear etiology possibly hypertension.     Leobardo returns to clinic after 2-year hiatus stating he thinks his heart is doing fairly well.  He denies having any chest arm neck jaw or shoulder discomfort.  No dyspnea on exertion orthopnea or PND.  No palpitations lightheadedness dizziness syncope or near syncope.    We did do a stress echo for which she has a couple of concerns.  This did appear to be negative for ischemia.  He did have a baseline cardiomyopathy with ejection fraction of 48%.  Fortunately it does improved to 60 to 65% with exercise.  He also has mildly dilated aorta at 4.2 with a dilated root at 4.4..     He is also concerned about his elevated fasting glucose, HDL deficiency and hypertriglyceridemia.     He also notes that when he checks his blood pressure at home it is often elevated.     ASSESSMENT AND PLAN:  Leobardo has no symptoms at this time to suggest ischemia and this is supported by his stress test..     Heart failure appears to be well compensated.  He does not appear to be having any clinical arrhythmias.    He still has his  cardiomyopathy which I have explained we have known about for years.  It is possibly due to hypertension poorly controlled which she is now willing to address.  It also could be familial and genetic which we can consider a genetic evaluation down the road.    At this time I am going to start him on losartan 50 mg daily.  I will have him return in 2 weeks for a blood pressure check and a BMP.     We also talked about his dilated aorta.  I have explained that losartan is good for that as well.  If his blood pressure is not well-controlled I will add a beta-blocker down the road also for its cardioprotective and vascular benefits.     We talked about his fasting lipid profile HDL deficiency, impaired fasting glucose, hypertriglyceridemia and hypertension all being part of the metabolic syndrome.  I recommended to eat a predominantly plant-based diet.  He exercise regularly including aerobic activity 30 minutes minimum 5 times a week and resistance activity twice a week.  I have explained that this will help raise his HDL lower his glucose and triglycerides.  I told him at this time with his LDL of 48 we do not need to intensify his statin therapy     I will follow-up on his blood pressure and lab work in 2 weeks.  Otherwise we will plan on a follow-up in 1 year with a repeat echocardiogram.     Thank you for allowing me to participate in his care.     Marshal Snyder MD, [facility]    The longitudinal plan of care for the diagnosis(es)/condition(s) as documented were addressed during this visit. Due to the added complexity in care, I will continue to support Leobardo in the subsequent management and with ongoing continuity of care.            Today's clinic visit entailed:  Review of the result(s) of each unique test - stress echocardiogram, lab work  Ordering of each unique test  Prescription drug management  40 minutes spent by me on the date of the encounter doing chart review, history and exam, documentation and  further activities per the note  Provider  Link to MDM Help Grid     The level of medical decision making during this visit was of moderate complexity.      Orders Placed This Encounter   Procedures    Basic metabolic panel    Basic metabolic panel    Lipid Profile    ALT    Follow-Up with Cardiology Nurse    Follow-Up with Cardiology       Orders Placed This Encounter   Medications    rosuvastatin (CRESTOR) 10 MG tablet     Sig: Take 1 tablet (10 mg) by mouth daily     Dispense:  90 tablet     Refill:  4    losartan (COZAAR) 50 MG tablet     Sig: Take 1 tablet (50 mg) by mouth daily     Dispense:  90 tablet     Refill:  4       Medications Discontinued During This Encounter   Medication Reason    cholecalciferol (VITAMIN D3) 25 mcg (1000 units) capsule     rosuvastatin (CRESTOR) 10 MG tablet Reorder (No AVS)         Encounter Diagnoses   Name Primary?    Mixed hyperlipidemia     HDL deficiency Yes    Family history of early CAD     IFG (impaired fasting glucose)     Cardiomyopathy of undetermined type (H)     Benign essential hypertension     Metabolic syndrome X     Ascending aorta dilatation (H24)        CURRENT MEDICATIONS:  Current Outpatient Medications   Medication Sig Dispense Refill    fish oil-omega-3 fatty acids 1000 MG capsule Take 2 g by mouth daily Unsure of dosage      losartan (COZAAR) 50 MG tablet Take 1 tablet (50 mg) by mouth daily 90 tablet 4    Multiple Vitamin (MULTI-VITAMIN) per tablet Take 1 tablet by mouth daily.      rosuvastatin (CRESTOR) 10 MG tablet Take 1 tablet (10 mg) by mouth daily 90 tablet 4       ALLERGIES   No Known Allergies    PAST MEDICAL HISTORY:  Past Medical History:   Diagnosis Date    Anxiety 05/01/2017    resolved 2018    Cardiomyopathy of undetermined type (H) 2015    Idiopathic nonischemic cardiomyopathy. Stress echocardiography 2015 showed an EF of 40%-45% at baseline, which improved with stress to 55%-60%. Stress MRI/MRA 2015 with normal perfusion at rest and  post-stress without evidence of inducible ischemia. Normal left ventricular size and mildly reduced systolic functionwith a calculated ejection fraction of  48 %.  Normal right ventricular size and syst    HDL deficiency 04/28/2015    IFG (impaired fasting glucose) 10/21/2022    Mixed hyperlipidemia     HUEY (obstructive sleep apnea)- moderate (AHI 21) 09/06/2018 9/4/2018 [facility] (198.0 lbs) - AHI 21.6, RDI 33.7, Supine AHI 38.1, REM AHI 45.1, Low O2 69.5%, Time Spent ?88% 14.5 minutes / Time Spent ?89% 17.3 minutes.    Other constipation 10/21/2022    Psychophysiological insomnia 05/01/2017    resolved 2018       PAST SURGICAL HISTORY:  Past Surgical History:   Procedure Laterality Date    COLONOSCOPY WITH CO2 INSUFFLATION N/A 10/24/2014    Procedure: COLONOSCOPY WITH CO2 INSUFFLATION;  Surgeon: Duane, William Charles, MD;  Location:  OR     TOOTH EXTRACTION W/FORCEP         FAMILY HISTORY:  Family History   Problem Relation Age of Onset    Eye Surgery Mother     C.A.D. 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 1 every 2-3 weeks  "   Drug use: No    Sexual activity: Not Currently     Comment: not currently    Other Topics Concern    Caffeine Concern Yes     Comment: tea 1-2 per day    Sleep Concern No    Stress Concern No    Weight Concern No    Special Diet No    Exercise No    Seat Belt Yes       Review of Systems:  Skin:  Positive for rash facial; sensitive to chlorine   Eyes:  Positive for visual blurring;glasses opthamalagist has been seen; age related. pre-diabetic  ENT:  Negative      Respiratory:  Positive for shortness of breath general tiredness resolves quickly   Cardiovascular:    fatigue;Positive for working at home and gets tired from sitting all day  Gastroenterology: Negative      Genitourinary:  Negative      Musculoskeletal:  Negative      Neurologic:  Negative      Psychiatric:  Positive for anxiety work pressure; situational  Heme/Lymph/Imm:  Negative      Endocrine:  Negative        Physical Exam:  Vitals: BP (!) 142/98   Pulse 102   Ht 1.82 m (5' 11.65\")   Wt 87.1 kg (192 lb)   SpO2 96%   BMI 26.29 kg/m      Constitutional:  cooperative, alert and oriented, well developed, well nourished, in no acute distress thin      Skin:  warm and dry to the touch, no apparent skin lesions or masses noted          Head:  normocephalic, no masses or lesions        Eyes:  pupils equal and round, conjunctivae and lids unremarkable, sclera white, no xanthalasma, EOMS intact, no nystagmus        Lymph:      ENT:  no pallor or cyanosis, dentition good        Neck:  carotid pulses are full and equal bilaterally;no carotid bruit        Respiratory:  normal breath sounds, clear to auscultation, normal A-P diameter, normal symmetry, normal respiratory excursion, no use of accessory muscles         Cardiac: regular rhythm;normal S1 and S2;no S3 or S4;no murmurs, gallops or rubs detected                pulses full and equal                                        GI:           Extremities and Muscular Skeletal:  no edema;no spinal " abnormalities noted;normal muscle strength and tone              Neurological:  no gross motor deficits        Psych:  affect appropriate, oriented to time, person and place        CC  No referring provider defined for this encounter.

## 2024-06-26 NOTE — LETTER
6/26/2024    Raphael Wagner MD  [address]  [place] [address]    RE: Eloise He       Dear Colleague,     I had the pleasure of seeing Eloise Johnson in the [facility].  HPI and Plan:   Leobardo is a very nice 54-year-old gentleman with past medical history significant for his father dying of myocardial infarction at age 42.  The patient has severe HDL deficiency and hypertriglyceridemia with impaired fasting glucose, all consistent with metabolic syndrome.  A stress test in 2009 demonstrated no evidence of ischemia.  Leobardo has always been quite reluctant to go on any medications.  Ultimately, I convinced him to start a baby dose aspirin and 10 mg of atorvastatin.  He has also been unwilling to do any sort of stress testing involving radiation including a calcium scoring test.  In 2015 I convinced him to undergo an MRI given his cardiomyopathy.  MRI demonstrated normal perfusion.  Ejection fraction was estimated to be 48%.  Stress echo from 2015 demonstrated an ejection fraction of 40%-45%, consistent with a cardiomyopathy of unclear etiology possibly hypertension.     Leobardo returns to clinic after 2-year hiatus stating he thinks his heart is doing fairly well.  He denies having any chest arm neck jaw or shoulder discomfort.  No dyspnea on exertion orthopnea or PND.  No palpitations lightheadedness dizziness syncope or near syncope.    We did do a stress echo for which she has a couple of concerns.  This did appear to be negative for ischemia.  He did have a baseline cardiomyopathy with ejection fraction of 48%.  Fortunately it does improved to 60 to 65% with exercise.  He also has mildly dilated aorta at 4.2 with a dilated root at 4.4..     He is also concerned about his elevated fasting glucose, HDL deficiency and hypertriglyceridemia.     He also notes that when he checks his blood pressure at home it is often elevated.     ASSESSMENT AND PLAN:  Leobardo has no symptoms at this time to  suggest ischemia and this is supported by his stress test..     Heart failure appears to be well compensated.  He does not appear to be having any clinical arrhythmias.    He still has his cardiomyopathy which I have explained we have known about for years.  It is possibly due to hypertension poorly controlled which she is now willing to address.  It also could be familial and genetic which we can consider a genetic evaluation down the road.    At this time I am going to start him on losartan 50 mg daily.  I will have him return in 2 weeks for a blood pressure check and a BMP.     We also talked about his dilated aorta.  I have explained that losartan is good for that as well.  If his blood pressure is not well-controlled I will add a beta-blocker down the road also for its cardioprotective and vascular benefits.     We talked about his fasting lipid profile HDL deficiency, impaired fasting glucose, hypertriglyceridemia and hypertension all being part of the metabolic syndrome.  I recommended to eat a predominantly plant-based diet.  He exercise regularly including aerobic activity 30 minutes minimum 5 times a week and resistance activity twice a week.  I have explained that this will help raise his HDL lower his glucose and triglycerides.  I told him at this time with his LDL of 48 we do not need to intensify his statin therapy     I will follow-up on his blood pressure and lab work in 2 weeks.  Otherwise we will plan on a follow-up in 1 year with a repeat echocardiogram.     Thank you for allowing me to participate in his care.     Marshal Snyder MD, [facility]    The longitudinal plan of care for the diagnosis(es)/condition(s) as documented were addressed during this visit. Due to the added complexity in care, I will continue to support Leobardo in the subsequent management and with ongoing continuity of care.            Today's clinic visit entailed:  Review of the result(s) of each unique test - stress  echocardiogram, lab work  Ordering of each unique test  Prescription drug management  40 minutes spent by me on the date of the encounter doing chart review, history and exam, documentation and further activities per the note  Provider  Link to MDM Help Grid     The level of medical decision making during this visit was of moderate complexity.      Orders Placed This Encounter   Procedures    Basic metabolic panel    Basic metabolic panel    Lipid Profile    ALT    Follow-Up with Cardiology Nurse    Follow-Up with Cardiology       Orders Placed This Encounter   Medications    rosuvastatin (CRESTOR) 10 MG tablet     Sig: Take 1 tablet (10 mg) by mouth daily     Dispense:  90 tablet     Refill:  4    losartan (COZAAR) 50 MG tablet     Sig: Take 1 tablet (50 mg) by mouth daily     Dispense:  90 tablet     Refill:  4       Medications Discontinued During This Encounter   Medication Reason    cholecalciferol (VITAMIN D3) 25 mcg (1000 units) capsule     rosuvastatin (CRESTOR) 10 MG tablet Reorder (No AVS)         Encounter Diagnoses   Name Primary?    Mixed hyperlipidemia     HDL deficiency Yes    Family history of early CAD     IFG (impaired fasting glucose)     Cardiomyopathy of undetermined type (H)     Benign essential hypertension     Metabolic syndrome X     Ascending aorta dilatation (H24)        CURRENT MEDICATIONS:  Current Outpatient Medications   Medication Sig Dispense Refill    fish oil-omega-3 fatty acids 1000 MG capsule Take 2 g by mouth daily Unsure of dosage      losartan (COZAAR) 50 MG tablet Take 1 tablet (50 mg) by mouth daily 90 tablet 4    Multiple Vitamin (MULTI-VITAMIN) per tablet Take 1 tablet by mouth daily.      rosuvastatin (CRESTOR) 10 MG tablet Take 1 tablet (10 mg) by mouth daily 90 tablet 4       ALLERGIES   No Known Allergies    PAST MEDICAL HISTORY:  Past Medical History:   Diagnosis Date    Anxiety 05/01/2017    resolved 2018    Cardiomyopathy of undetermined type (H) 2015    Idiopathic  nonischemic cardiomyopathy. Stress echocardiography 2015 showed an EF of 40%-45% at baseline, which improved with stress to 55%-60%. Stress MRI/MRA 2015 with normal perfusion at rest and post-stress without evidence of inducible ischemia. Normal left ventricular size and mildly reduced systolic functionwith a calculated ejection fraction of  48 %.  Normal right ventricular size and syst    HDL deficiency 04/28/2015    IFG (impaired fasting glucose) 10/21/2022    Mixed hyperlipidemia     HUEY (obstructive sleep apnea)- moderate (AHI 21) 09/06/2018 9/4/2018 [facility] (198.0 lbs) - AHI 21.6, RDI 33.7, Supine AHI 38.1, REM AHI 45.1, Low O2 69.5%, Time Spent ?88% 14.5 minutes / Time Spent ?89% 17.3 minutes.    Other constipation 10/21/2022    Psychophysiological insomnia 05/01/2017    resolved 2018       PAST SURGICAL HISTORY:  Past Surgical History:   Procedure Laterality Date    COLONOSCOPY WITH CO2 INSUFFLATION N/A 10/24/2014    Procedure: COLONOSCOPY WITH CO2 INSUFFLATION;  Surgeon: Duane, William Charles, MD;  Location:  OR     TOOTH EXTRACTION W/FORCEP         FAMILY HISTORY:  Family History   Problem Relation Age of Onset    Eye Surgery Mother     C.A.D. 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 1 every 2-3 weeks    Drug use: No    Sexual activity: Not Currently     Comment: not currently    Other Topics Concern    Caffeine Concern Yes     Comment: tea 1-2 per day    Sleep Concern No    Stress Concern No    Weight Concern No    Special Diet No    Exercise No    Seat Belt Yes       Review of Systems:  Skin:  Positive for rash facial; sensitive to chlorine   Eyes:  Positive for visual blurring;glasses opthamalagist has been seen; age related. pre-diabetic  ENT:  Negative      Respiratory:  Positive for shortness of breath general tiredness resolves quickly   Cardiovascular:    fatigue;Positive for working at home and gets tired from sitting all day  Gastroenterology: Negative      Genitourinary:  Negative      Musculoskeletal:  Negative      Neurologic:  Negative      Psychiatric:  Positive for anxiety work pressure; situational  Heme/Lymph/Imm:  Negative      Endocrine:  Negative        Physical Exam:  Vitals: BP (!) 142/98   Pulse 102   Ht 1.82 m (5' 11.65\")   Wt 87.1 kg (192 lb)   SpO2 96%   BMI 26.29 kg/m      Constitutional:  cooperative, alert and oriented, well developed, well nourished, in no acute distress thin      Skin:  warm and dry to the touch, no apparent skin lesions or masses noted          Head:  normocephalic, no masses or lesions        Eyes:  pupils equal and round, conjunctivae and lids unremarkable, sclera white, no xanthalasma, EOMS intact, no nystagmus        Lymph:      ENT:  no pallor or cyanosis, dentition good        Neck:  carotid pulses are full and equal bilaterally;no carotid bruit        Respiratory:  normal breath sounds, clear to auscultation, normal A-P diameter, normal symmetry, normal respiratory excursion, no use of accessory muscles         Cardiac: regular rhythm;normal S1 and S2;no S3 or S4;no " murmurs, gallops or rubs detected                pulses full and equal                                        GI:           Extremities and Muscular Skeletal:  no edema;no spinal abnormalities noted;normal muscle strength and tone              Neurological:  no gross motor deficits        Psych:  affect appropriate, oriented to time, person and place        CC  No referring provider defined for this encounter.        Thank you for allowing me to participate in the care of your patient.      Sincerely,     Marshal Snyder MD     [facility]

## 2024-07-01 ENCOUNTER — TELEPHONE (OUTPATIENT)
Dept: CARDIOLOGY | Facility: CLINIC | Age: 54
End: 2024-07-01
Payer: COMMERCIAL

## 2024-07-01 NOTE — TELEPHONE ENCOUNTER
Patient left a voicemail over the weekend asking whether or not he needs to fast for the labs next week. Pt is scheduled for a BMP/BP check on 7/12/24 to assess the effects of losartan 50mg daily started at visit 6/26/24.     Called patient back and discussed that he does not need to fast. He expressed understanding.

## 2024-07-12 ENCOUNTER — LAB (OUTPATIENT)
Dept: LAB | Facility: CLINIC | Age: 54
End: 2024-07-12
Payer: COMMERCIAL

## 2024-07-12 ENCOUNTER — TELEPHONE (OUTPATIENT)
Dept: CARDIOLOGY | Facility: CLINIC | Age: 54
End: 2024-07-12

## 2024-07-12 ENCOUNTER — ALLIED HEALTH/NURSE VISIT (OUTPATIENT)
Dept: CARDIOLOGY | Facility: CLINIC | Age: 54
End: 2024-07-12
Attending: INTERNAL MEDICINE
Payer: COMMERCIAL

## 2024-07-12 DIAGNOSIS — I10 BENIGN ESSENTIAL HYPERTENSION: Primary | ICD-10-CM

## 2024-07-12 DIAGNOSIS — I10 BENIGN ESSENTIAL HYPERTENSION: ICD-10-CM

## 2024-07-12 LAB
ANION GAP SERPL CALCULATED.3IONS-SCNC: 5 MMOL/L (ref 7–15)
BUN SERPL-MCNC: 20.7 MG/DL (ref 6–20)
CALCIUM SERPL-MCNC: 9.2 MG/DL (ref 8.6–10)
CHLORIDE SERPL-SCNC: 105 MMOL/L (ref 98–107)
CREAT SERPL-MCNC: 0.93 MG/DL (ref 0.67–1.17)
DEPRECATED HCO3 PLAS-SCNC: 29 MMOL/L (ref 22–29)
EGFRCR SERPLBLD CKD-EPI 2021: >90 ML/MIN/1.73M2
GLUCOSE SERPL-MCNC: 90 MG/DL (ref 70–99)
POTASSIUM SERPL-SCNC: 4.3 MMOL/L (ref 3.4–5.3)
SODIUM SERPL-SCNC: 139 MMOL/L (ref 135–145)

## 2024-07-12 PROCEDURE — 80048 BASIC METABOLIC PNL TOTAL CA: CPT

## 2024-07-12 PROCEDURE — 36415 COLL VENOUS BLD VENIPUNCTURE: CPT

## 2024-07-12 PROCEDURE — 99207 PR NO CHARGE NURSE ONLY: CPT

## 2024-07-12 NOTE — TELEPHONE ENCOUNTER
BMP is in process, will send to Dr Snyder pending completion of labs. Pt was started on losartan 50mg daily on 6/26/24, recommendation was for BMP/BP check 2 weeks later.     Addendum 1625: BMP now available, provider messaged.     Component      Latest Ref Rng 7/12/2024  2:24 PM   Sodium      135 - 145 mmol/L 139    Potassium      3.4 - 5.3 mmol/L 4.3    Chloride      98 - 107 mmol/L 105    Carbon Dioxide (CO2)      22 - 29 mmol/L 29    Anion Gap      7 - 15 mmol/L 5 (L)    Urea Nitrogen      6.0 - 20.0 mg/dL 20.7 (H)    Creatinine      0.67 - 1.17 mg/dL 0.93    GFR Estimate      >60 mL/min/1.73m2 >90    Calcium      8.6 - 10.0 mg/dL 9.2    Glucose      70 - 99 mg/dL 90

## 2024-07-12 NOTE — PROGRESS NOTES
Last office visit: 24 with Dr Snyder    Previous blood pressure: 142/98 Mm Hg     Previous heart rate: 102 bpm    Today's Readings    Time of readin:00 PM    Morning medications were taken at: 10:00 AM - Patient says that he normally takes it around 8:00 AM.     Blood pressure #1: 145/96 mm Hg    Blood pressure #2: 147/92 mm Hg    Today's heart rate: 72 bpm    Other Information    Ordering Provider: Dr Snyder    Results sent to team # : 2    Additional comments:    Patients states that his blood pressure has been ranging from 120-160. He has been noticing some dry mouth and minor stomach discomfort since starting the medication. He made is clear that these issues were minor and was not sure if these could be sides effects. He has also noticed that his eye sight may be slightly worse and would like to know if this could be caused by the medication.    Tatyana RECINOS 24

## 2024-07-15 RX ORDER — CARVEDILOL 25 MG/1
25 TABLET ORAL 2 TIMES DAILY WITH MEALS
Qty: 180 TABLET | Refills: 3 | Status: SHIPPED | OUTPATIENT
Start: 2024-07-15

## 2024-07-15 NOTE — TELEPHONE ENCOUNTER
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  2 different classes of meds have been shown to be beneficial and aortopathy's.  That is ARB's specifically losartan and beta-blockers.  Even though his blood pressure did not go down the losartan is beneficial to prevent the future dilatation of his aorta and deterioration of his left ventricular function.  We typically use more than 1 medicine to get a synergistic effect.  Beta-blockers have also been shown to be beneficial and aortopathy's.  With cardiomyopathies beneficial drugs are beta-blockers, ARB/ACE inhibitors/ARNI, SGLT-2 inhibitors and aldosterone inhibitors.  Ideally/theoretically he should be on 4 medicines for his cardiomyopathy.    If he wants to take supplements they may be beneficial but no data to support their benefit.       Spoke to patient, reviewed message from Dr Snyder. He was agreeable to trying the carvedilol. Rx sent to pharmacy and order placed for BP check in 2 weeks. Scheduling number provided to call and set this up.

## 2024-07-15 NOTE — TELEPHONE ENCOUNTER
Marshal Snyder MD  You3 days ago     Start coreg 25 bid. BP check in two weeks.Lab looks dehydrated. Drink more water.Will see how his symptoms do over time       Spoke to patient, reviewed results and recommendations from Dr Snyder. He is apprehensive to add another medication, wonders why the losartan isn't just discontinued altogether if it didn't have any effect. He notes he is very sensitive to medications and is wary of any side effects from either med. He wants to confirm with Dr Snyder that he should stay on losartan. He notes his mother is a doctor in [place] and told him that she is taking Vitamin B9/folic acid for blood vessel stabilization. He wonders if he should be doing the same. Provider messaged.

## 2024-08-02 ENCOUNTER — TELEPHONE (OUTPATIENT)
Dept: CARDIOLOGY | Facility: CLINIC | Age: 54
End: 2024-08-02

## 2024-08-02 ENCOUNTER — ALLIED HEALTH/NURSE VISIT (OUTPATIENT)
Dept: CARDIOLOGY | Facility: CLINIC | Age: 54
End: 2024-08-02
Attending: INTERNAL MEDICINE
Payer: COMMERCIAL

## 2024-08-02 DIAGNOSIS — I10 BENIGN ESSENTIAL HYPERTENSION: ICD-10-CM

## 2024-08-02 PROCEDURE — 99207 PR NO CHARGE NURSE ONLY: CPT | Performed by: INTERNAL MEDICINE

## 2024-08-02 NOTE — TELEPHONE ENCOUNTER
Patient came in for BP check. He was started on coreg 25mg BID after losartan 50 mg daily failed to bring down his readings.    Patient has questions for Dr. Snyder:  1) can he stop losartan. Reviewed that due to his aorta he needs to use the losartan as preventative for deterioration.  Patient expressed understanding but wants that (again) from Dr. Snyder that he needs both medications    2) after starting the coreg he is feeling some fatigue and occasional SOB with walking. This is slight and goes away with rest. Reviewed that it may be BB effect and we can monitor to see if it abates over time.    3) discussed elevated triglycerides as patient likes to have 4 beers /week and 1 glass of wine. Discussed decreasing his intake of sugars, alcohol and white foods.    BP check today  Time of readin:10pm   Morning medications were taken at: 9am     Today's blood pressure:  131/75  mm Hg  Today's heart rate:    61   bpm     Will message Dr. Snyder to review    Patient stated if he cannot answer his phone next week, he is fine with a voice mail update.

## 2024-08-02 NOTE — PROGRESS NOTES
Last office visit: 24    Previous blood pressure:    142/98  Mm Hg     Previous heart rate:    102  bpm    Time of readin:10pm     Morning medications were taken at: 9am    Today's blood pressure:  131/75  mm Hg    Today's heart rate:    61   bpm     Ordering Provider: Dr. Snyder    Results sent to team # : 2    Additional comments:     Patient would like to discuss medications.     Kristian

## 2024-08-05 NOTE — TELEPHONE ENCOUNTER
Marshal Snyder MD Anderson, Randi AZUL, RN2 days ago     He is a dilated order and a cardiomyopathy that I think is secondary to his hypertension.  His current numbers are barely acceptable.  The combination of beta-blockers and losartan together are very good for his aorta and his cardiomyopathy.  As you stated patient's often get used to beta-blockers.  He needs to recognize he is getting older and blood pressure, diet and exercise are wear-and-tear on his body that can either help him or hurt him and we need to treat it now to avoid the morbidity and mortality associated with not addressing them.       Spoke to patient, reviewed message as above from Dr Snyder. He expressed understanding and will continue with his current medications.

## 2024-09-16 ENCOUNTER — TELEPHONE (OUTPATIENT)
Dept: CARDIOLOGY | Facility: CLINIC | Age: 54
End: 2024-09-16
Payer: COMMERCIAL

## 2024-09-16 DIAGNOSIS — I77.810 ASCENDING AORTA DILATATION (H): Primary | ICD-10-CM

## 2024-09-16 DIAGNOSIS — I42.9 CARDIOMYOPATHY OF UNDETERMINED TYPE (H): ICD-10-CM

## 2024-09-16 DIAGNOSIS — I10 BENIGN ESSENTIAL HYPERTENSION: ICD-10-CM

## 2024-09-16 NOTE — TELEPHONE ENCOUNTER
Patient called Team 2 nurse line Friday after hours reporting bothersome symptoms, particularly after exercise. Return call to patient Monday morning to discuss. Patient reports he has been experiencing the following symptoms with the following scoring for around two weeks - imbalance 3-4/10, dizziness 2-3/10, blurred vision 5/10 and fatigue 7/10. Patient reports symptoms are worst after running, which he does for about 30 minutes daily.     Patient denies any recent medication changes other than starting carvedilol 25 mg BID on 7/15 in addition to losartan 50 mg daily. Reports occasionally checking BP at home - when he does readings are running 120-130 systolic and he is never experiencing low readings. Patient denies any recent illness or GI issues. Patient reports only drinking about 2 plastic water bottles a day and consuming 2 cups of green and coffee. Patient educated on the importance of adequate fluid intake and effects of caffeine.     Patient is concerned his symptoms could be due to his medications. Patient to follow up with Dr Snyder in one year. Message routed to Dr. Snyder for review.    UPDATE 0904 - Patient called team 2 nurse line and left VM his BP this morning was lower than usual at 107/62. Reports he does not check often

## 2024-09-17 NOTE — TELEPHONE ENCOUNTER
Marshal Snyder MD  You16 hours ago (4:08 PM)     Let's run a GXT on meds.     --------------------------------------    Patient called with above recommendation from Dr. Snyder. Patient is hesitant to do another stress test, even though this one would be different as he would not be holding any medications prior. Patient has many questions and concerns. Also does not want to pay for another stress test. Patient would prefer to come in to meet with an TOMASA prior to scheduling anything to discuss symptoms, medication and procedure in more detail. Order entered and staffing messaged to please call patient to schedule. FYI sent to Dr. Snyder.

## 2025-01-11 ENCOUNTER — HEALTH MAINTENANCE LETTER (OUTPATIENT)
Age: 55
End: 2025-01-11

## 2025-01-24 ENCOUNTER — MEDICAL CORRESPONDENCE (OUTPATIENT)
Dept: HEALTH INFORMATION MANAGEMENT | Facility: CLINIC | Age: 55
End: 2025-01-24
Payer: COMMERCIAL

## 2025-02-21 ENCOUNTER — TELEPHONE (OUTPATIENT)
Dept: CARDIOLOGY | Facility: CLINIC | Age: 55
End: 2025-02-21
Payer: COMMERCIAL

## 2025-02-21 DIAGNOSIS — I10 BENIGN ESSENTIAL HYPERTENSION: Primary | ICD-10-CM

## 2025-02-21 NOTE — TELEPHONE ENCOUNTER
"Message from patient re: he has weaned off his coreg 25mg BID on his own. Message states he was having side effects (fatigue, muscle pain). He states his BP has not changed.  Patient states he is leaving next Wednesday for a 2 week trip to [place]. He wants to know what meds to pack for his BP    1550 called patient. He states he has been off the coreg for several weeks. He states his BP before AM meds is 145/95 and after losartan 50mg it drops to 135/90. He asks if he needs a med change.     He also asks what he should pack to take overseas to [place] - should he increase losartan or take his coreg \"just in case\".    Patient wants to be seen when he comes back after March 9. He would really like to see Dr. Snyder again, but TOMASA is ok. He wants to work on his BP.    Will message Dr. Snyder to review    " Pt informed . received rx yesterday .

## 2025-02-24 RX ORDER — HYDROCHLOROTHIAZIDE 12.5 MG/1
12.5 TABLET ORAL DAILY
Qty: 90 TABLET | Refills: 3 | Status: SHIPPED | OUTPATIENT
Start: 2025-02-24

## 2025-02-24 NOTE — TELEPHONE ENCOUNTER
Marshal Snyder MD Anderson, Barbara E, RN3 days ago     Add hydrochlorothiazide 12.5 daily recheck BMP upon return. Stop coreg     =====================================================    Patient called to inform of Dr. Snyder's above recommendation. Patient educated extensively on indication., use and effects of medication. Patient instructed to make lab appointment for when he returns from [facility] in 2 weeks. Patient ha many questions which have been answered and patient verbalizes understanding.      Patient is due for Cardiology follow up in June of 2025. Would like to know if Dr. Snyder would be able/ willing to see him one last time before senior care. Question routed to Dr. Snyder for review.    UPDATE 1511: Patient called team 2 nurse line as he has not heard from pharmacy if medication is ready for pickup and concerned prescription was not sent. Writer reviewed with patient that prescription was sent to [facility] pharmacy in [place] as requested at 0921 this morning. Instructed to contact pharmacy directly. Patient verbalizes understanding.

## 2025-02-24 NOTE — TELEPHONE ENCOUNTER
Marshal Snyder MD  You37 minutes ago (4:11 PM)     Yes I can see him in April.  Do all the lab and the echocardiogram as I have ordered.  You can use an urgent slot.     =================================================    Patient called and informed of above recommendation from Dr. Snyder. Patient is agreeable to plan and scheduling messaged to call patient to schedule.

## 2025-02-25 ENCOUNTER — TELEPHONE (OUTPATIENT)
Dept: CARDIOLOGY | Facility: CLINIC | Age: 55
End: 2025-02-25
Payer: COMMERCIAL

## 2025-02-25 NOTE — TELEPHONE ENCOUNTER
2/25/25 lm/tess, Per Dr Snyder, yes I can see him in April.  Do all the lab and the echocardiogram as I have ordered. You can use an urgent slot, amh

## 2025-03-14 ENCOUNTER — MYC MEDICAL ADVICE (OUTPATIENT)
Dept: CARDIOLOGY | Facility: CLINIC | Age: 55
End: 2025-03-14
Payer: COMMERCIAL

## 2025-03-14 DIAGNOSIS — Z82.49 FAMILY HISTORY OF EARLY CAD: Chronic | ICD-10-CM

## 2025-03-14 DIAGNOSIS — I42.9 CARDIOMYOPATHY OF UNDETERMINED TYPE (H): Primary | ICD-10-CM

## 2025-03-14 DIAGNOSIS — R06.00 DYSPNEA: ICD-10-CM

## 2025-03-17 ENCOUNTER — TELEPHONE (OUTPATIENT)
Dept: CARDIOLOGY | Facility: CLINIC | Age: 55
End: 2025-03-17
Payer: COMMERCIAL

## 2025-03-17 NOTE — TELEPHONE ENCOUNTER
1st attempt- Left voicemail for the patient to call back and schedule the following:    Appointment type:  Testing only- No clinic visit Stress test + labs  Provider:  Claudio  Return date:  next avail, per pt non urgent  Additional appointment(s) needed:  labs (same day, done prior to stress test)  Additional Notes:  pt called but as we were trying to schedule something came up and pt had to hang up; he mentioned he wants a call to get scheduled tomorrow (3/18) and he is available at any time tomorrow.  Specialty phone number: [phone]

## 2025-03-17 NOTE — TELEPHONE ENCOUNTER
1st attempt- Left voicemail for the patient to call back and schedule the following:    Appointment type:  Testing only- No clinic visit  Provider:  Dr Snyder  Return date:  prior to Dr Snyder appt   Additional appointment(s) needed:    Additional Notes:    Specialty phone number: [phone]

## 2025-03-17 NOTE — TELEPHONE ENCOUNTER
Reply from Dr. Snyder:  Marshal Snyder MD Anderson, Randi AZUL, RN  Phone Number: [phone]     Change to a stress echo and check an Nt pro bnp when he comes in for stress test  =====  Orders placed for bnp and stress echo    1108 called patient with update. He would prefer to move his labs and the stress echo to [facility] and cancel the [place] echo and labs in April.

## 2025-03-17 NOTE — TELEPHONE ENCOUNTER
My chart message from patient:  Sure. ThanksGeetha.   By the way, new medicine combo works well in keeping the BP numbers. But I still have obvious symptoms such as short of breath /panting when climbing stairway, bend my body to get things and straight up, squad and stand , etc. Also fatigue, easily to get tired.   ========  Called patient. He states these are not new concerns but he is noticing the SOB and fatigue seem to have progressed; or he is noticing them more.  Patient states he is working on his diet and has lost several pounds. No noticeable edema. He also notes some occasional muscle aches.  He is trying to exercise but is walking rather than running due to his knee pain. He is not able to swim at the level that he used to.    Patient to see Dr. Snyder on 4/25/2025  He has schedule an echo and labs on 4/18/2025 @ [facility].    Patient will call to see if his testing can be moved forward a bit.      Will message Dr. Snyder to review

## 2025-03-28 ENCOUNTER — HOSPITAL ENCOUNTER (OUTPATIENT)
Dept: CARDIOLOGY | Facility: CLINIC | Age: 55
Discharge: HOME | End: 2025-03-28
Attending: INTERNAL MEDICINE | Admitting: INTERNAL MEDICINE
Payer: COMMERCIAL

## 2025-03-28 DIAGNOSIS — R06.00 DYSPNEA: ICD-10-CM

## 2025-03-28 DIAGNOSIS — Z82.49 FAMILY HISTORY OF EARLY CAD: Chronic | ICD-10-CM

## 2025-03-28 DIAGNOSIS — I42.9 CARDIOMYOPATHY OF UNDETERMINED TYPE (H): ICD-10-CM

## 2025-03-28 PROCEDURE — 93016 CV STRESS TEST SUPVJ ONLY: CPT | Performed by: INTERNAL MEDICINE

## 2025-03-28 PROCEDURE — 255N000002 HC RX 255 OP 636: Performed by: INTERNAL MEDICINE

## 2025-03-28 PROCEDURE — 93325 DOPPLER ECHO COLOR FLOW MAPG: CPT | Mod: TC

## 2025-03-28 PROCEDURE — 93350 STRESS TTE ONLY: CPT | Mod: 26 | Performed by: INTERNAL MEDICINE

## 2025-03-28 PROCEDURE — 93018 CV STRESS TEST I&R ONLY: CPT | Performed by: INTERNAL MEDICINE

## 2025-03-28 PROCEDURE — 93325 DOPPLER ECHO COLOR FLOW MAPG: CPT | Mod: 26 | Performed by: INTERNAL MEDICINE

## 2025-03-28 PROCEDURE — 93321 DOPPLER ECHO F-UP/LMTD STD: CPT | Mod: 26 | Performed by: INTERNAL MEDICINE

## 2025-03-28 RX ADMIN — HUMAN ALBUMIN MICROSPHERES AND PERFLUTREN 9 ML: 10; .22 INJECTION, SOLUTION INTRAVENOUS at 10:39

## 2025-04-25 PROBLEM — I10 BENIGN ESSENTIAL HYPERTENSION: Status: ACTIVE | Noted: 2025-04-25

## 2025-04-28 ENCOUNTER — TELEPHONE (OUTPATIENT)
Dept: CARDIOLOGY | Facility: CLINIC | Age: 55
End: 2025-04-28
Payer: COMMERCIAL

## 2025-04-28 DIAGNOSIS — G47.30 SLEEP APNEA: Primary | ICD-10-CM

## 2025-04-28 NOTE — TELEPHONE ENCOUNTER
Patient called Dr. Snyder's office as he forgot to ask Dr. Snyder at [facility] if he recommends wearing his CPAP at night. Patient reports he is prescribed one, however does not like wearing it and hasn't been for a few years. Patient educated on risk for high blood pressure, cardiac arrhythmias like Afib and heart disease and failure with chronic untreated sleep apnea. Patient reports he saw sleep medicine around 8 years ago and has not been wearing CPAP. Would like new consult ordered and asks if Dr. Snyder recommends any providers. Patient request routed to Dr. Snyder for review.

## 2025-04-28 NOTE — TELEPHONE ENCOUNTER
Marshal Snyder MD  You1 hour ago (1:56 PM)     Yes sleep study with our neurology clinic upstairs     ===========================================    Patient called to informed of above recommendation from Dr. Snyder. Provided with scheduling number per appointment notes of [phone]. Patient is agreeable to plan and verbalizes understanding.     Kristen Casey RN  [facility]  [phone]

## 2025-04-29 ENCOUNTER — PATIENT OUTREACH (OUTPATIENT)
Dept: CARE COORDINATION | Facility: CLINIC | Age: 55
End: 2025-04-29
Payer: COMMERCIAL

## 2025-05-05 ENCOUNTER — TELEPHONE (OUTPATIENT)
Dept: CARDIOLOGY | Facility: CLINIC | Age: 55
End: 2025-05-05
Payer: COMMERCIAL

## 2025-05-05 NOTE — TELEPHONE ENCOUNTER
Patient left a voicemail asking that the sleep medicine order be faxed to [facility] as it is significantly cheaper to do it there under his insurance.     Confirmed with nursing leadership in lieu of Dr Snyder's long-term, OK to fax order. Called patient and let him know this will be sent on 5/7 when nursing is on site. He was OK with this.     [facility]   Fax [phone]      Addendum 5/7/25 at 0845: Order faxed.

## 2025-07-23 ENCOUNTER — TELEPHONE (OUTPATIENT)
Dept: CARDIOLOGY | Facility: CLINIC | Age: 55
End: 2025-07-23
Payer: COMMERCIAL

## 2025-07-23 NOTE — TELEPHONE ENCOUNTER
1st attempt- Left voicemail for the patient to call back and schedule the following:    Appointment type:  Testing only- No clinic visit  Provider:  Karine Lee  Return date:  8/1  Additional appointment(s) needed:  Labs  Additional Notes:  schedule prior to office visit  Specialty phone number: [phone]

## 2025-07-28 DIAGNOSIS — E78.2 MIXED HYPERLIPIDEMIA: Chronic | ICD-10-CM

## 2025-07-28 RX ORDER — ROSUVASTATIN CALCIUM 10 MG/1
10 TABLET, COATED ORAL DAILY
Qty: 90 TABLET | Refills: 2 | Status: SHIPPED | OUTPATIENT
Start: 2025-07-28

## 2025-07-29 ENCOUNTER — LAB (OUTPATIENT)
Dept: LAB | Facility: CLINIC | Age: 55
End: 2025-07-29
Payer: COMMERCIAL

## 2025-07-29 DIAGNOSIS — E88.810 METABOLIC SYNDROME X: ICD-10-CM

## 2025-07-29 DIAGNOSIS — I42.9 CARDIOMYOPATHY OF UNDETERMINED TYPE (H): Chronic | ICD-10-CM

## 2025-07-29 LAB
ANION GAP SERPL CALCULATED.3IONS-SCNC: 11 MMOL/L (ref 7–15)
BUN SERPL-MCNC: 23.4 MG/DL (ref 6–20)
CALCIUM SERPL-MCNC: 9.6 MG/DL (ref 8.8–10.4)
CHLORIDE SERPL-SCNC: 105 MMOL/L (ref 98–107)
CREAT SERPL-MCNC: 0.77 MG/DL (ref 0.67–1.17)
EGFRCR SERPLBLD CKD-EPI 2021: >90 ML/MIN/1.73M2
GLUCOSE SERPL-MCNC: 107 MG/DL (ref 70–99)
HCO3 SERPL-SCNC: 24 MMOL/L (ref 22–29)
POTASSIUM SERPL-SCNC: 3.9 MMOL/L (ref 3.4–5.3)
SODIUM SERPL-SCNC: 140 MMOL/L (ref 135–145)

## 2025-07-29 PROCEDURE — 36415 COLL VENOUS BLD VENIPUNCTURE: CPT

## 2025-07-29 PROCEDURE — 80048 BASIC METABOLIC PNL TOTAL CA: CPT

## 2025-08-11 ENCOUNTER — TELEPHONE (OUTPATIENT)
Dept: CARDIOLOGY | Facility: CLINIC | Age: 55
End: 2025-08-11
Payer: COMMERCIAL